# Patient Record
Sex: FEMALE | Race: WHITE | NOT HISPANIC OR LATINO | Employment: OTHER | URBAN - METROPOLITAN AREA
[De-identification: names, ages, dates, MRNs, and addresses within clinical notes are randomized per-mention and may not be internally consistent; named-entity substitution may affect disease eponyms.]

---

## 2017-01-09 DIAGNOSIS — E03.8 OTHER SPECIFIED HYPOTHYROIDISM: Primary | ICD-10-CM

## 2017-01-09 NOTE — TELEPHONE ENCOUNTER
Levothyroxine     Last Written Prescription Date: 12/05/16  Last Quantity: 30, # refills: 1  Last Office Visit with G, P or [facility] prescribing provider: 06/14/16        TSH   Date Value Ref Range Status   10/12/2016 3.90 0.40 - 4.00 mU/L Final

## 2017-01-10 RX ORDER — LEVOTHYROXINE SODIUM 88 UG/1
88 TABLET ORAL DAILY
Qty: 90 TABLET | Refills: 1 | Status: SHIPPED | OUTPATIENT
Start: 2017-01-10 | End: 2020-08-18

## 2017-07-01 ENCOUNTER — HOSPITAL ENCOUNTER (EMERGENCY)
Facility: CLINIC | Age: 67
Discharge: HOME | End: 2017-07-01
Attending: EMERGENCY MEDICINE | Admitting: EMERGENCY MEDICINE
Payer: MEDICARE

## 2017-07-01 ENCOUNTER — APPOINTMENT (OUTPATIENT)
Dept: CT IMAGING | Facility: CLINIC | Age: 67
End: 2017-07-01
Attending: EMERGENCY MEDICINE
Payer: MEDICARE

## 2017-07-01 VITALS
TEMPERATURE: 98.8 F | SYSTOLIC BLOOD PRESSURE: 148 MMHG | OXYGEN SATURATION: 97 % | RESPIRATION RATE: 20 BRPM | HEIGHT: 65 IN | HEART RATE: 64 BPM | DIASTOLIC BLOOD PRESSURE: 78 MMHG

## 2017-07-01 DIAGNOSIS — I10 ESSENTIAL HYPERTENSION: ICD-10-CM

## 2017-07-01 LAB
ANION GAP SERPL CALCULATED.3IONS-SCNC: 6 MMOL/L (ref 3–14)
BUN SERPL-MCNC: 15 MG/DL (ref 7–30)
CALCIUM SERPL-MCNC: 8.4 MG/DL (ref 8.5–10.1)
CHLORIDE SERPL-SCNC: 104 MMOL/L (ref 94–109)
CO2 SERPL-SCNC: 27 MMOL/L (ref 20–32)
CREAT SERPL-MCNC: 0.57 MG/DL (ref 0.52–1.04)
ERYTHROCYTE [DISTWIDTH] IN BLOOD BY AUTOMATED COUNT: 13.7 % (ref 10–15)
GFR SERPL CREATININE-BSD FRML MDRD: ABNORMAL ML/MIN/1.7M2
GLUCOSE SERPL-MCNC: 111 MG/DL (ref 70–99)
HCT VFR BLD AUTO: 45.2 % (ref 35–47)
HGB BLD-MCNC: 14.4 G/DL (ref 11.7–15.7)
MCH RBC QN AUTO: 26.9 PG (ref 26.5–33)
MCHC RBC AUTO-ENTMCNC: 31.9 G/DL (ref 31.5–36.5)
MCV RBC AUTO: 85 FL (ref 78–100)
PLATELET # BLD AUTO: 286 10E9/L (ref 150–450)
POTASSIUM SERPL-SCNC: 3.7 MMOL/L (ref 3.4–5.3)
RBC # BLD AUTO: 5.35 10E12/L (ref 3.8–5.2)
SODIUM SERPL-SCNC: 137 MMOL/L (ref 133–144)
TROPONIN I SERPL-MCNC: NORMAL UG/L (ref 0–0.04)
WBC # BLD AUTO: 5 10E9/L (ref 4–11)

## 2017-07-01 PROCEDURE — 25000128 H RX IP 250 OP 636: Performed by: EMERGENCY MEDICINE

## 2017-07-01 PROCEDURE — 70450 CT HEAD/BRAIN W/O DYE: CPT

## 2017-07-01 PROCEDURE — 99284 EMERGENCY DEPT VISIT MOD MDM: CPT | Mod: 25

## 2017-07-01 PROCEDURE — 96374 THER/PROPH/DIAG INJ IV PUSH: CPT

## 2017-07-01 PROCEDURE — 84484 ASSAY OF TROPONIN QUANT: CPT | Performed by: EMERGENCY MEDICINE

## 2017-07-01 PROCEDURE — 85027 COMPLETE CBC AUTOMATED: CPT | Performed by: EMERGENCY MEDICINE

## 2017-07-01 PROCEDURE — 25000125 ZZHC RX 250: Performed by: EMERGENCY MEDICINE

## 2017-07-01 PROCEDURE — 96375 TX/PRO/DX INJ NEW DRUG ADDON: CPT

## 2017-07-01 PROCEDURE — 80048 BASIC METABOLIC PNL TOTAL CA: CPT | Performed by: EMERGENCY MEDICINE

## 2017-07-01 RX ORDER — METOPROLOL TARTRATE 1 MG/ML
5 INJECTION, SOLUTION INTRAVENOUS ONCE
Status: COMPLETED | OUTPATIENT
Start: 2017-07-01 | End: 2017-07-01

## 2017-07-01 RX ORDER — ONDANSETRON 2 MG/ML
4 INJECTION INTRAMUSCULAR; INTRAVENOUS ONCE
Status: COMPLETED | OUTPATIENT
Start: 2017-07-01 | End: 2017-07-01

## 2017-07-01 RX ADMIN — METOPROLOL TARTRATE 5 MG: 5 INJECTION INTRAVENOUS at 05:24

## 2017-07-01 RX ADMIN — ONDANSETRON 4 MG: 2 INJECTION INTRAMUSCULAR; INTRAVENOUS at 05:24

## 2017-07-01 ASSESSMENT — ENCOUNTER SYMPTOMS: HEADACHES: 1

## 2017-07-01 NOTE — ED PROVIDER NOTES
History     Chief Complaint:  Hypertension    HPI   Jennifer Stinson is a 67 year old female, with a history of hypertension and hyperlipidemia, who presents to the emergency department for evaluation of hypertension. The patient came in with a female  who aided in translating the history and the reason for the emergency department. She reported that the patient has been experiencing high blood pressure for the last few days, and noted that even after taking her blood pressure medication, her blood pressure will drop for a small period of time and then increase once again. To note, the patient reported that she was prescribed metoprolol, but has not been taking it because she did not know what it was for. At 0300 this morning, she woke up with a headache, checked her blood pressure and found it to be high. This prompted the patient and her female  to present to the emergency department. The patient does not report any other physical complaints.      Allergies:  No Known Drug Allergies      Medications:    Synthroid  Omeprazole  Aspirin  Lipitor  Zestril  Metoprolol - Patient is supposed to be taking this, but reports has not been.  Norvasc     Past Medical History:    GERD  Hyperlipidemia  Hypertension  Hypothyroidism  Stroke    Past Surgical History:    Cholecystectomy    Family History:    The patient denies any relevant family medical history.     Social History:  The patient was accompanied to the ED by female .  Smoking Status: No  Smokeless Tobacco: N/A  Alcohol Use: No   Marital Status:       Review of Systems   Cardiovascular: Negative for chest pain.   Neurological: Positive for headaches.   All other systems reviewed and are negative.    Physical Exam   Vitals:  Patient Vitals for the past 24 hrs:   BP Temp Pulse Resp SpO2 Height   07/01/17 0615 148/78 - 64 - 97 % -   07/01/17 0545 146/81 - 65 - 99 % -   07/01/17 0532 149/81 - 64 20 97 % -   07/01/17 0525 165/88 - 66 - 95  "% -   07/01/17 0502 (!) 183/104 98.8  F (37.1  C) 78 20 98 % 1.651 m (5' 5\")      Physical Exam  Nursing note and vitals reviewed.  Constitutional: Cooperative.   HENT:   Mouth/Throat: Mucous membranes are normal.   Cardiovascular: Normal rate, regular rhythm and normal heart sounds.  No murmur.  Pulmonary/Chest: Effort normal and breath sounds normal. No respiratory distress. No wheezes. No rales.   Abdominal: Soft. Normal appearance. There is no tenderness. There is no rigidity and no guarding.   Neurological: Alert. Oriented x4.  Strength normal. CN 2-12 intact.   Skin: Skin is warm and dry. No rash noted.   Psychiatric: Normal mood and affect.    Emergency Department Course     Imaging:  Radiology findings were communicated with the patient and female  who voiced understanding of the findings.    CT Head w/o Contrast:  IMPRESSION: Unremarkable noncontrast CT of the head.  Reading per radiology.    Laboratory:  Laboratory findings were communicated with the patient and female  who voiced understanding of the findings.    CBC: AWNL (WBC 5.0, HGB 14.4, )   BMP: Glucose 111 (H), Calcium 8.4 (L) o/w WNL (Creatinine 0.57)     Troponin (Collected 0514): <0.015     Interventions:  0524 Metoprolol 5 mg IV  0524 Zofran 4 mg IV     Emergency Department Course:  Nursing notes and vitals reviewed.  I performed an exam of the patient as documented above.   The patient was sent for a CT Head while in the emergency department, results above.    IV was inserted and blood was drawn for laboratory testing, results above.     0615 The patient reports that her headache has gone from an 8/10 to a 4/10 on the pain scale.     I discussed the treatment plan with the patient. They expressed understanding of this plan and consented to discharge. They will be discharged home with instructions for care and follow up. In addition, the patient will return to the emergency department if their symptoms persist, worsen, " if new symptoms arise or if there is any concern.  All questions were answered.     I personally reviewed the laboratory results with the Patient and female  and answered all related questions prior to discharge.    Impression & Plan      Medical Decision Making:  Ms. Stinson is a 67 year old female who presents with headache and high blood pressure. She has no neurologic symptoms of concern and her head CT scan is negative for any bleed. She is supposed to be taking three different blood pressure medications, but is only taking two due to a miscommunication as to what the metoprolol was for. With Lopressor administration here, her blood pressure has corrected and she feels much better. Plan of care would be to have her start her metoprolol at home in addition to her other blood pressure medications and to follow up with her primary physician as needed.    Diagnosis:    ICD-10-CM    1. Essential hypertension I10       Disposition:   Discharged.    Scribe Disclosure:  I, Carissa Cabrera, am serving as a scribe at 5:17 AM on 7/1/2017 to document services personally performed by Noe Boyer MD, based on my observations and the provider's statements to me. 7/1/2017   [facility] EMERGENCY DEPARTMENT       Noe Boyer MD  07/01/17 0625

## 2017-07-01 NOTE — ED AVS SNAPSHOT
[facility] Emergency Department    [address] [facility] [address]    Phone:  [phone]    Fax:  [phone]                                       Jennifer Stinson   MRN: [id number]    Department:  [facility] Emergency Department   Date of Visit:  7/1/2017           Patient Information     Date Of Birth          1950        Your diagnoses for this visit were:     Essential hypertension        You were seen by Noe Boyer MD.      Follow-up Information     Follow up with Lucia cMbride MD.    Specialty:  Internal Medicine    Why:  As needed    Contact information:    [facility]  [address] [facility] [address]  [phone]        Discharge References/Attachments     HIGH BLOOD PRESSURE, CONTROLLING (ENGLISH)      24 Hour Appointment Hotline       To make an appointment at any [facility], call [phone] ([phone]). If you don't have a family doctor or clinic, we will help you find one. [facility] are conveniently located to serve the needs of you and your family.             Review of your medicines      Our records show that you are taking the medicines listed below. If these are incorrect, please call your family doctor or clinic.        Dose / Directions Last dose taken    amLODIPine 10 MG tablet   Commonly known as:  NORVASC   Dose:  10 mg   Quantity:  90 tablet        Take 1 tablet (10 mg) by mouth daily   Refills:  1        aspirin 325 MG EC tablet   Dose:  325 mg   Quantity:  60 tablet        Take 1 tablet (325 mg) by mouth daily   Refills:  0        atorvastatin 40 MG tablet   Commonly known as:  LIPITOR   Dose:  40 mg   Quantity:  30 tablet        Take 1 tablet (40 mg) by mouth daily   Refills:  0        levothyroxine 88 MCG tablet   Commonly known as:  SYNTHROID/LEVOTHROID   Dose:  88 mcg   Quantity:  90 tablet        Take 1 tablet (88 mcg) by mouth daily   Refills:  1        lisinopril 40 MG tablet    Commonly known as:  PRINIVIL/ZESTRIL   Dose:  40 mg   Quantity:  30 tablet        Take 1 tablet (40 mg) by mouth daily   Refills:  0        metoprolol 25 MG tablet   Commonly known as:  LOPRESSOR   Dose:  12.5 mg   Quantity:  60 tablet        Take 0.5 tablets (12.5 mg) by mouth 2 times daily   Refills:  0        omeprazole 20 MG tablet   Dose:  20 mg   Quantity:  60 tablet        Take 1 tablet (20 mg) by mouth daily Take 30-60 minutes before a meal.   Refills:  5                Procedures and tests performed during your visit     Basic metabolic panel    CBC (platelets, no diff)    Head CT w/o contrast    IV access    Troponin I (now)      Orders Needing Specimen Collection     None      Pending Results     No orders found from 6/29/2017 to 7/2/2017.            Pending Culture Results     No orders found from 6/29/2017 to 7/2/2017.            Pending Results Instructions     If you had any lab results that were not finalized at the time of your Discharge, you can call the ED Lab Result RN at [phone]. You will be contacted by this team for any positive Lab results or changes in treatment. The nurses are available 7 days a week from 10A to 6:30P.  You can leave a message 24 hours per day and they will return your call.        Test Results From Your Hospital Stay        7/1/2017  6:15 AM      Narrative     CT HEAD W/O CONTRAST  7/1/2017 6:06 AM    HISTORY: Headache. Hypertension.    TECHNIQUE: Volumetric helical acquisition through the head without IV  contrast, displayed as 0.5 cm axial reconstructions. Radiation dose  for this scan was reduced using automated exposure control, adjustment  of the mA and/or kV according to patient size, or iterative  reconstruction technique.    COMPARISON: 9/11/2014.    FINDINGS: No acute intracranial abnormality. No intracranial  hemorrhage, mass lesion, or acute infarction identified. Ventricles  are within normal limits for size and configuration. The visualized  paranasal  sinuses and mastoid air cells appear unremarkable. No bony  abnormality is seen.        Impression     IMPRESSION: Unremarkable noncontrast CT of the head.    LINDSEY SUMMERS MD         7/1/2017  5:41 AM      Component Results     Component Value Ref Range & Units Status    WBC 5.0 4.0 - 11.0 10e9/L Final    RBC Count 5.35 (H) 3.8 - 5.2 10e12/L Final    Hemoglobin 14.4 11.7 - 15.7 g/dL Final    Hematocrit 45.2 35.0 - 47.0 % Final    MCV 85 78 - 100 fl Final    MCH 26.9 26.5 - 33.0 pg Final    MCHC 31.9 31.5 - 36.5 g/dL Final    RDW 13.7 10.0 - 15.0 % Final    Platelet Count 286 150 - 450 10e9/L Final         7/1/2017  6:01 AM      Component Results     Component Value Ref Range & Units Status    Troponin I ES  0.000 - 0.045 ug/L Final    <0.015  The 99th percentile for upper reference range is 0.045 ug/L.  Troponin values in   the range of 0.045 - 0.120 ug/L may be associated with risks of adverse   clinical events.           7/1/2017  6:01 AM      Component Results     Component Value Ref Range & Units Status    Sodium 137 133 - 144 mmol/L Final    Potassium 3.7 3.4 - 5.3 mmol/L Final    Chloride 104 94 - 109 mmol/L Final    Carbon Dioxide 27 20 - 32 mmol/L Final    Anion Gap 6 3 - 14 mmol/L Final    Glucose 111 (H) 70 - 99 mg/dL Final    Urea Nitrogen 15 7 - 30 mg/dL Final    Creatinine 0.57 0.52 - 1.04 mg/dL Final    GFR Estimate >90  Non  GFR Calc   >60 mL/min/1.7m2 Final    GFR Estimate If Black >90   GFR Calc   >60 mL/min/1.7m2 Final    Calcium 8.4 (L) 8.5 - 10.1 mg/dL Final                Clinical Quality Measure: Blood Pressure Screening     Your blood pressure was checked while you were in the emergency department today. The last reading we obtained was  BP: 148/78 . Please read the guidelines below about what these numbers mean and what you should do about them.  If your systolic blood pressure (the top number) is less than 120 and your diastolic blood pressure (the bottom  "number) is less than 80, then your blood pressure is normal. There is nothing more that you need to do about it.  If your systolic blood pressure (the top number) is 120-139 or your diastolic blood pressure (the bottom number) is 80-89, your blood pressure may be higher than it should be. You should have your blood pressure rechecked within a year by a primary care provider.  If your systolic blood pressure (the top number) is 140 or greater or your diastolic blood pressure (the bottom number) is 90 or greater, you may have high blood pressure. High blood pressure is treatable, but if left untreated over time it can put you at risk for heart attack, stroke, or kidney failure. You should have your blood pressure rechecked by a primary care provider within the next 4 weeks.  If your provider in the emergency department today gave you specific instructions to follow-up with your doctor or provider even sooner than that, you should follow that instruction and not wait for up to 4 weeks for your follow-up visit.        Thank you for choosing [facility]       Thank you for choosing [facility] for your care. Our goal is always to provide you with excellent care. Hearing back from our patients is one way we can continue to improve our services. Please take a few minutes to complete the written survey that you may receive in the mail after you visit with us. Thank you!        Leikr Information     Leikr lets you send messages to your doctor, view your test results, renew your prescriptions, schedule appointments and more. To sign up, go to [email or web address] . Click on \"Log in\" on the left side of the screen, which will take you to the Welcome page. Then click on \"Sign up Now\" on the right side of the page.     You will be asked to enter the access code listed below, as well as some personal information. Please follow the directions to create your username and password.     Your access code is: [email or web address]  Expires: " 2017  6:17 AM     Your access code will  in 90 days. If you need help or a new code, please call your [facility] clinic or [phone].        Care EveryWhere ID     This is your Care EveryWhere ID. This could be used by other organizations to access your [facility] medical records  [id number]        Equal Access to Services     LANNY MEIER : Imelda Mckay, aundrea rangel, herbert graff, amanda sabillon . So [facility] [phone].    ATENCIÓN: Si habla español, tiene a andre disposición servicios gratuitos de asistencia lingüística. Llame al [phone].    We comply with applicable federal civil rights laws and [place] laws. We do not discriminate on the basis of race, color, national origin, age, disability sex, sexual orientation or gender identity.            After Visit Summary       This is your record. Keep this with you and show to your community pharmacist(s) and doctor(s) at your next visit.

## 2017-07-01 NOTE — ED AVS SNAPSHOT
[facility] Emergency Department    [address]    [facility] [address]    Phone:  [phone]    Fax:  [phone]                                       Jennifer Stinson   MRN: [id number]    Department:  [facility] Emergency Department   Date of Visit:  7/1/2017           After Visit Summary Signature Page     I have received my discharge instructions, and my questions have been answered. I have discussed any challenges I see with this plan with the nurse or doctor.    ..........................................................................................................................................  Patient/Patient Representative Signature      ..........................................................................................................................................  Patient Representative Print Name and Relationship to Patient    ..................................................               ................................................  Date                                            Time    ..........................................................................................................................................  Reviewed by Signature/Title    ...................................................              ..............................................  Date                                                            Time

## 2017-07-01 NOTE — ED NOTES
Pt to ER with c/o high BP pt developed a HA so she checked her BP and found it to be high, pt is currently on 3 HTN meds

## 2018-05-14 ENCOUNTER — TRANSFERRED RECORDS (OUTPATIENT)
Dept: HEALTH INFORMATION MANAGEMENT | Facility: CLINIC | Age: 68
End: 2018-05-14

## 2018-05-22 ENCOUNTER — TRANSFERRED RECORDS (OUTPATIENT)
Dept: HEALTH INFORMATION MANAGEMENT | Facility: CLINIC | Age: 68
End: 2018-05-22

## 2018-06-13 ENCOUNTER — OFFICE VISIT (OUTPATIENT)
Dept: INTERNAL MEDICINE | Facility: CLINIC | Age: 68
End: 2018-06-13
Payer: MEDICARE

## 2018-06-13 ENCOUNTER — TELEPHONE (OUTPATIENT)
Dept: INTERNAL MEDICINE | Facility: CLINIC | Age: 68
End: 2018-06-13

## 2018-06-13 VITALS
RESPIRATION RATE: 12 BRPM | OXYGEN SATURATION: 96 % | DIASTOLIC BLOOD PRESSURE: 90 MMHG | HEART RATE: 99 BPM | TEMPERATURE: 97.7 F | WEIGHT: 200.9 LBS | SYSTOLIC BLOOD PRESSURE: 150 MMHG | BODY MASS INDEX: 33.43 KG/M2

## 2018-06-13 DIAGNOSIS — I63.9 CEREBROVASCULAR ACCIDENT (CVA), UNSPECIFIED MECHANISM (H): ICD-10-CM

## 2018-06-13 DIAGNOSIS — I10 ESSENTIAL HYPERTENSION: ICD-10-CM

## 2018-06-13 DIAGNOSIS — Z01.818 PREOP GENERAL PHYSICAL EXAM: Primary | ICD-10-CM

## 2018-06-13 DIAGNOSIS — R94.39 ABNORMAL CARDIOVASCULAR STRESS TEST: ICD-10-CM

## 2018-06-13 LAB — HGB BLD-MCNC: 13.3 G/DL (ref 11.7–15.7)

## 2018-06-13 PROCEDURE — 85018 HEMOGLOBIN: CPT | Performed by: INTERNAL MEDICINE

## 2018-06-13 PROCEDURE — 36415 COLL VENOUS BLD VENIPUNCTURE: CPT | Performed by: INTERNAL MEDICINE

## 2018-06-13 PROCEDURE — 80048 BASIC METABOLIC PNL TOTAL CA: CPT | Performed by: INTERNAL MEDICINE

## 2018-06-13 PROCEDURE — 93000 ELECTROCARDIOGRAM COMPLETE: CPT | Performed by: INTERNAL MEDICINE

## 2018-06-13 PROCEDURE — 99215 OFFICE O/P EST HI 40 MIN: CPT | Performed by: INTERNAL MEDICINE

## 2018-06-13 RX ORDER — METOPROLOL SUCCINATE 50 MG/1
50 TABLET, EXTENDED RELEASE ORAL DAILY
Qty: 30 TABLET | Refills: 1 | Status: SHIPPED | OUTPATIENT
Start: 2018-06-13 | End: 2018-09-02

## 2018-06-13 NOTE — NURSING NOTE
/90  Pulse 99  Temp 97.7  F (36.5  C) (Oral)  Resp 12  Wt 200 lb 14.4 oz (91.1 kg)  SpO2 96%  BMI 33.43 kg/m2  Patient in for Pre-Op.  Karin Boudreaux CMA

## 2018-06-13 NOTE — MR AVS SNAPSHOT
After Visit Summary   6/13/2018    Jennifer Stinson    MRN: [id number]           Patient Information     Date Of Birth          1950        Visit Information        Provider Department      6/13/2018 10:45 AM Lucia Mcbride MD; [employer] [facility]        Today's Diagnoses     Preop general physical exam    -  1    Abnormal cardiovascular stress test        Essential hypertension        Cerebrovascular accident (CVA), unspecified mechanism (H)          Care Instructions      Before Your Surgery      Call your surgeon if there is any change in your health. This includes signs of a cold or flu (such as a sore throat, runny nose, cough, rash or fever).    Do not smoke, drink alcohol or take over the counter medicine (unless your surgeon or primary care doctor tells you to) for the 24 hours before and after surgery.    If you take prescribed drugs: Follow your doctor s orders about which medicines to take and which to stop until after surgery.    Eating and drinking prior to surgery: follow the instructions from your surgeon    Take a shower or bath the night before surgery. Use the soap your surgeon gave you to gently clean your skin. If you do not have soap from your surgeon, use your regular soap. Do not shave or scrub the surgery site.  Wear clean pajamas and have clean sheets on your bed.           Follow-ups after your visit        Additional Services     CARDIOLOGY EVAL ADULT REFERRAL       Preferred location:--  Cardiac clearance - abnormal Nuclear stress test in 2016, pt declined angiogram   [facility] [phone]   [email or web address]    Please be aware that coverage of these services is subject to the terms and limitations of your health insurance plan.  Call member services at your health plan with any benefit or coverage questions.      Please bring the following to your  "appointment:  Any x-rays, CTs or MRIs which have been performed. Contact the facility where they were done to arrange for  prior to your scheduled appointment.    List of current medications  This referral request   Any documents/labs given to you for this referral                  Your next 10 appointments already scheduled     Jun 26, 2018  1:15 PM CDT   New Visit with Mario Rhodes MD   [facility]    [address] [place] [id number] [phone] OPT 2              Who to contact     If you have questions or need follow up information about today's clinic visit or your schedule please contact [facility] directly at [phone].  Normal or non-critical lab and imaging results will be communicated to you by MyChart, letter or phone within 4 business days after the clinic has received the results. If you do not hear from us within 7 days, please contact the clinic through [employer] or phone. If you have a critical or abnormal lab result, we will notify you by phone as soon as possible.  Submit refill requests through iT[employer] or call your pharmacy and they will forward the refill request to us. Please allow 3 business days for your refill to be completed.          Additional Information About Your Visit        [employer] Information     iTwin lets you send messages to your doctor, view your test results, renew your prescriptions, schedule appointments and more. To sign up, go to [email or web address] . Click on \"Log in\" on the left side of the screen, which will take you to the Welcome page. Then click on \"Sign up Now\" on the right side of the page.     You will be asked to enter the access code listed below, as well as some personal information. Please follow the directions to create your username and password.     Your access code is: [email or web address]  Expires: 9/12/2018  4:18 PM     Your access code will "  in 90 days. If you need help or a new code, please call your [facility] clinic or [phone].        Care EveryWhere ID     This is your Care EveryWhere ID. This could be used by other organizations to access your [facility] medical records  [id number]        Your Vitals Were     Pulse Temperature Respirations Pulse Oximetry BMI (Body Mass Index)       99 97.7  F (36.5  C) (Oral) 12 96% 33.43 kg/m2        Blood Pressure from Last 3 Encounters:   18 150/90   17 148/78   16 149/80    Weight from Last 3 Encounters:   18 200 lb 14.4 oz (91.1 kg)   16 202 lb 3.2 oz (91.7 kg)   16 194 lb 6.4 oz (88.2 kg)              We Performed the Following     Basic metabolic panel     CARDIOLOGY EVAL ADULT REFERRAL     EKG 12-lead complete w/read - Clinics     Hemoglobin          Today's Medication Changes          These changes are accurate as of 18 11:59 PM.  If you have any questions, ask your nurse or doctor.               Start taking these medicines.        Dose/Directions    metoprolol succinate 50 MG 24 hr tablet   Commonly known as:  TOPROL-XL   Used for:  Essential hypertension   Started by:  Lucia Mcbride MD        Dose:  50 mg   Take 1 tablet (50 mg) by mouth daily   Quantity:  30 tablet   Refills:  1            Where to get your medicines      These medications were sent to [facility] [address] [place] [address]     Phone:  [phone]     metoprolol succinate 50 MG 24 hr tablet                Primary Care Provider Office Phone # Fax #    Lucia Mcbride [phone]       [address]  [facility] [address]        Equal Access to Services     LANNY MEIER AH: Imelda rose Soreji, wamynor luqadaha, qaybta kaalamanda prakash. [facility] [phone].    ATENCIÓN: Si mariza mukherjee,  tiene a andre disposición servicios gratuitos de asistencia lingüística. Heidi finley [phone].    We comply with applicable federal civil rights laws and [place] laws. We do not discriminate on the basis of race, color, national origin, age, disability, sex, sexual orientation, or gender identity.            Thank you!     Thank you for choosing [facility]  for your care. Our goal is always to provide you with excellent care. Hearing back from our patients is one way we can continue to improve our services. Please take a few minutes to complete the written survey that you may receive in the mail after your visit with us. Thank you!             Your Updated Medication List - Protect others around you: Learn how to safely use, store and throw away your medicines at www.disposemymeds.org.          This list is accurate as of 6/13/18 11:59 PM.  Always use your most recent med list.                   Brand Name Dispense Instructions for use Diagnosis    amLODIPine 10 MG tablet    NORVASC    90 tablet    Take 1 tablet (10 mg) by mouth daily    Essential hypertension with goal blood pressure less than 140/90       aspirin 325 MG EC tablet     60 tablet    Take 1 tablet (325 mg) by mouth daily    Chest pain, unspecified type       atorvastatin 40 MG tablet    LIPITOR    30 tablet    Take 1 tablet (40 mg) by mouth daily    Chest pain, unspecified type       levothyroxine 88 MCG tablet    SYNTHROID/LEVOTHROID    90 tablet    Take 1 tablet (88 mcg) by mouth daily    Other specified hypothyroidism       lisinopril 40 MG tablet    PRINIVIL/ZESTRIL    30 tablet    Take 1 tablet (40 mg) by mouth daily    Essential hypertension       metoprolol succinate 50 MG 24 hr tablet    TOPROL-XL    30 tablet    Take 1 tablet (50 mg) by mouth daily    Essential hypertension       omeprazole 20 MG tablet     60 tablet    Take 1 tablet (20 mg) by mouth daily Take 30-60 minutes before a meal.    Gastroesophageal reflux disease  without esophagitis

## 2018-06-13 NOTE — PROGRESS NOTES
[place] [address]  [facility] [address]  [phone]  Dept: [phone]    PRE-OP EVALUATION:  Today's date: 2018    Jennifer Stinson (: 1950) presents for pre-operative evaluation assessment as requested by Dr. Devries.  She requires evaluation and anesthesia risk assessment prior to undergoing surgery/procedure for treatment of  Lt sinus pain  Removal of titanium dental implant form lt maxillary sinus ..    Fax number for surgical facility: [facility]   [phone]  Primary Physician: Lucia Mcbride  Type of Anesthesia Anticipated: General    Patient has a Health Care Directive or Living Will:  NO    Preop Questions 2018   Who is doing your surgery? [facility]   Type of surgery  Removal of dental implant from lt maxillary sinus .    1.  Do you have a history of Heart attack, stroke, stent, coronary bypass surgery, or other heart surgery? YES  - stroke    2.  Do you ever have any pain or discomfort in your chest? YES -    3.  Do you have a history of  Heart Failure? No   4.   Are you troubled by shortness of breath when:  walking on a level surface, or up a slight hill, or at night? No   5.  Do you currently have a cold, bronchitis or other respiratory infection? No   6.  Do you have a cough, shortness of breath, or wheezing? No   7.  Do you sometimes get pains in the calves of your legs when you walk? No   8. Do you or anyone in your family have previous history of blood clots? No   9.  Do you or does anyone in your family have a serious bleeding problem such as prolonged bleeding following surgeries or cuts? No   10. Have you ever had problems with anemia or been told to take iron pills? No   11. Have you had any abnormal blood loss such as black, tarry or bloody stools, or abnormal vaginal bleeding? No   12. Have you ever had a blood transfusion? No   13. Have you or any of your relatives ever had problems with  anesthesia? No   14. Do you have sleep apnea, excessive snoring or daytime drowsiness? No   15. Do you have any prosthetic heart valves? No   16. Do you have prosthetic joints? No   17. Is there any chance that you may be pregnant? No         HPI:       HYPERLIPIDEMIA - Patient has a long history of Hyperlipidemia requiring medication for treatment with recent fair control. Patient reports no problems or side effects with the medication.                                                                                                                                                       .  HYPERTENSION - Patient has longstanding history of HTN , currently denies any symptoms referable to elevated blood pressure. Specifically denies chest pain, palpitations, dyspnea, orthopnea, PND or peripheral edema. Blood pressure readings have not been in normal range. Current medication regimen is as listed below. Patient denies any side effects of medication.     Pt has not been taking Metoprolol since 2016                                                                                                                                                                                .  HYPOTHYROIDISM - Patient has a longstanding history of chronic Hypothyroidism. Patient has been doing well, noting no tremor, insomnia, hair loss or changes in skin texture. Continues to take medications as directed, without adverse reactions or side effects. Last /6. But pt has been seeing MD at [facility] and had TSH 2 mths ago per pt   Lab Results   Component Value Date    TSH 3.90 10/12/2016     Had abnormal nuclear stress test in 2016, pt was advised to get angiogram but pt declined and did not f/u.              .    MEDICAL HISTORY:     Patient Active Problem List    Diagnosis Date Noted     Abdominal pain 11/28/2016     Priority: Medium     Other specified hypothyroidism 02/10/2016     Priority: Medium     Essential hypertension  01/06/2016     Priority: Medium     Gastroesophageal reflux disease without esophagitis 01/06/2016     Priority: Medium     Hyperlipidemia with target LDL less than 130      Priority: Medium     Diagnosis updated by automated process. Provider to review and confirm.        Past Medical History:   Diagnosis Date     GERD (gastroesophageal reflux disease)      Hyperlipidemia LDL goal < 130      Hypertension      Hypothyroidism      Stroke (H)      Past Surgical History:   Procedure Laterality Date     CHOLECYSTECTOMY       Current Outpatient Prescriptions   Medication Sig Dispense Refill     amLODIPine (NORVASC) 10 MG tablet Take 1 tablet (10 mg) by mouth daily 90 tablet 1     aspirin  MG EC tablet Take 1 tablet (325 mg) by mouth daily 60 tablet 0     atorvastatin (LIPITOR) 40 MG tablet Take 1 tablet (40 mg) by mouth daily 30 tablet 0     levothyroxine (SYNTHROID/LEVOTHROID) 88 MCG tablet Take 1 tablet (88 mcg) by mouth daily 90 tablet 1     lisinopril (PRINIVIL,ZESTRIL) 40 MG tablet Take 1 tablet (40 mg) by mouth daily 30 tablet 0     metoprolol (LOPRESSOR) 25 MG tablet Take 0.5 tablets (12.5 mg) by mouth 2 times daily 60 tablet 0     omeprazole 20 MG tablet Take 1 tablet (20 mg) by mouth daily Take 30-60 minutes before a meal. 60 tablet 5     OTC products: None, except as noted above    Allergies   Allergen Reactions     No Clinical Screening - See Comments Swelling and Rash     Other reaction(s): Edema  All meat: poultry, beef, etc.  Reaction: swelling  Cinnamon and other spices  Reaction: face swelling, rash  Cinnamon and other spices  Reaction: face swelling, rash  All meat: poultry, beef, etc.  Reaction: swelling     Nuts Other (See Comments) and Rash     All nuts  Reaction: swelling, rash, headache     Peanut-Derived Rash     Other reaction(s): Headache  All nuts  Reaction: swelling, rash, headache      Latex Allergy: NO    Social History   Substance Use Topics     Smoking status: Never Smoker      Smokeless tobacco: Never Used     Alcohol use No     History   Drug Use No       REVIEW OF SYSTEMS:   CONSTITUTIONAL: NEGATIVE for fever, chills, change in weight  INTEGUMENTARY/SKIN: NEGATIVE for worrisome rashes, moles or lesions  EYES: NEGATIVE for vision changes or irritation  ENT/MOUTH: NEGATIVE for ear, mouth and throat problems  RESP: NEGATIVE for significant cough or SOB  BREAST: NEGATIVE for masses, tenderness or discharge  CV: NEGATIVE for chest pain, palpitations or peripheral edema  GI: NEGATIVE for nausea, abdominal pain, heartburn, or change in bowel habits  : NEGATIVE for frequency, dysuria, or hematuria  MUSCULOSKELETAL: NEGATIVE for significant arthralgias or myalgia  NEURO: NEGATIVE for weakness, dizziness or paresthesias  ENDOCRINE: NEGATIVE for temperature intolerance, skin/hair changes  HEME: NEGATIVE for bleeding problems  PSYCHIATRIC: NEGATIVE for changes in mood or affect    EXAM:   /90  Pulse 99  Temp 97.7  F (36.5  C) (Oral)  Resp 12  Wt 200 lb 14.4 oz (91.1 kg)  SpO2 96%  BMI 33.43 kg/m2    GENERAL APPEARANCE: healthy, alert and no distress     EYES: EOMI, PERRL     HENT: ear canals and TM's normal and nose and mouth without ulcers or lesions     NECK: no adenopathy, no asymmetry, masses, or scars and thyroid normal to palpation     RESP: lungs clear to auscultation - no rales, rhonchi or wheezes     CV: regular rates and rhythm,       ABDOMEN:  soft, nontender, no HSM or masses and bowel sounds normal     MS: extremities normal- no gross deformities noted, no evidence of inflammation in joints, FROM in all extremities.     NEURO: Normal strength and tone, sensory exam grossly normal, mentation intact and speech normal     PSYCH: mentation appears normal. and affect normal/bright     LYMPHATICS: No cervical adenopathy    DIAGNOSTICS:   EKG: sinus rhythm , normal intervals, no acute ST/T changes c/w ischemia, no LVH by voltage criteria,   Hemoglobin  Pending   Serum Potassium  pending     Recent Labs   Lab Test  07/01/17   0514  11/29/16   0700  11/28/16   1722  11/28/16   0031   11/25/11   1904  11/25/11   0045   HGB  14.4   --   13.0  13.4   < >  12.4  13.2   PLT  286   --   301  307   < >  224  258   INR   --    --    --    --    --    --   0.96   NA  137   --    --   137   < >  139  141   POTASSIUM  3.7  3.8   --   3.7   < >  3.2*  3.5   CR  0.57   --    --   0.60   < >  0.52  0.59   A1C   --    --    --    --    --   5.5   --     < > = values in this interval not displayed.        IMPRESSION:      (Z01.818) Preop general physical exam  (primary encounter diagnosis)  Comment: removal dental implant form lt maxillary sinus   Plan: Basic metabolic panel, Hemoglobin,              (R94.39) Abnormal cardiovascular stress test  Plan: stress test 2016- Myocardial perfusion imaging using single isotope technique demonstrated infarction of the basal and mid inferior wall with significant residual inferior and inferolateral ischemia. No f/u done, pt declined angiogram. will refer to cardiology for cardiac clearance before surgery .CARDIOLOGY EVAL ADULT REFERRAL            (I10) Essential hypertension  Plan: BP elevated, started on Metoprolol 50 mg once daily as directed.explained clearly about the medication,insructions and side effects. Advised to continue Amlodipine 10 mg daily and lisinopril 40 mg daily.Advised to follow low salt diet and exercise and f/u in 1mth.       (I63.9) Cerebrovascular accident (CVA), unspecified mechanism (H)  --stable, on aspirin 325 mg daily.        The proposed surgical procedure is considered INTERMEDIATE risk.    REVISED CARDIAC RISK INDEX  The patient has the following serious cardiovascular risks for perioperative complications such as (MI, PE, VFib and 3  AV Block):  Coronary Artery Disease (MI, positive stress test, angina, Qs on EKG)  Cerebrovascular Disease (TIA or CVA)  INTERPRETATION: 2 risks: Class III (moderate risk - 6.6% complication rate)           ICD-10-CM    1. Preop general physical exam Z01.818 Basic metabolic panel     Hemoglobin     CARDIOLOGY EVAL ADULT REFERRAL   2. Abnormal cardiovascular stress test R94.39 CARDIOLOGY EVAL ADULT REFERRAL   3. Essential hypertension I10 metoprolol succinate (TOPROL-XL) 50 MG 24 hr tablet   4. Cerebrovascular accident (CVA), unspecified mechanism (H) I63.9        RECOMMENDATIONS:       Cardiovascular Risk  Performs 4 METs exercise without symptoms (Light housework (dusting, washing dishes), Climb a flight of stairs and Walk on level ground at 15 minutes per mile (4 miles/hour)) .         Anticoagulant or Antiplatelet Medication Use  ASPIRIN: Discontinue ASA 7  days prior to procedure to reduce bleeding risk.    NSAIDS:   Stop  3 days prior to surgery        ACE Inhibitor or Angiotensin Receptor Blocker (ARB) Use  Ace inhibitor or Angiotensin Receptor Blocker (ARB) and should HOLD this medication for the 24 hours prior to surgery.      Patient should be evaluated by cardiology and needs cardiology clearance for the surgery as patient had abnormal stress test and declined angiogram in 2016.       Signed Electronically by: Lucia Mcbride MD    Copy of this evaluation report is provided to requesting physician.    [facility] Preop Guidelines    Revised Cardiac Risk Index

## 2018-06-13 NOTE — TELEPHONE ENCOUNTER
Brunilda a nurse at Dr. Jose Eduardo Devries's office calling about surgery tomorrow.  She received a phone call from  while pt was at preop but it was cutting in and out so she could not tell what it was regarding.  She is wondering if pt is cleared for surgery tomorrow?  Please advise.    Brunilda [phone]

## 2018-06-14 LAB
ANION GAP SERPL CALCULATED.3IONS-SCNC: 10 MMOL/L (ref 3–14)
BUN SERPL-MCNC: 14 MG/DL (ref 7–30)
CALCIUM SERPL-MCNC: 8.8 MG/DL (ref 8.5–10.1)
CHLORIDE SERPL-SCNC: 106 MMOL/L (ref 94–109)
CO2 SERPL-SCNC: 27 MMOL/L (ref 20–32)
CREAT SERPL-MCNC: 0.53 MG/DL (ref 0.52–1.04)
GFR SERPL CREATININE-BSD FRML MDRD: >90 ML/MIN/1.7M2
GLUCOSE SERPL-MCNC: 131 MG/DL (ref 70–99)
POTASSIUM SERPL-SCNC: 4.1 MMOL/L (ref 3.4–5.3)
SODIUM SERPL-SCNC: 143 MMOL/L (ref 133–144)

## 2018-06-27 ENCOUNTER — OFFICE VISIT (OUTPATIENT)
Dept: CARDIOLOGY | Facility: CLINIC | Age: 68
End: 2018-06-27
Attending: INTERNAL MEDICINE
Payer: MEDICARE

## 2018-06-27 VITALS
DIASTOLIC BLOOD PRESSURE: 81 MMHG | WEIGHT: 199.3 LBS | HEIGHT: 66 IN | BODY MASS INDEX: 32.03 KG/M2 | SYSTOLIC BLOOD PRESSURE: 135 MMHG | HEART RATE: 80 BPM

## 2018-06-27 DIAGNOSIS — R01.1 HEART MURMUR: ICD-10-CM

## 2018-06-27 DIAGNOSIS — R94.39 ABNORMAL NUCLEAR STRESS TEST: Primary | ICD-10-CM

## 2018-06-27 PROCEDURE — 99204 OFFICE O/P NEW MOD 45 MIN: CPT | Performed by: INTERNAL MEDICINE

## 2018-06-27 NOTE — LETTER
6/27/2018    Lucia Mcbride MD  [address] [facility] [address]    RE: Jennifer Stinson       Dear Colleague,    I had the pleasure of seeing Jennifer Stinson in the [facility].    Cardiology Consultation      Jennifer Stinson MRN# [id number]   YOB: 1950 Age: 68 year old   Date of Visit 06/27/2018     Reason for consult:  Preoperative cardiovascular assessment           Assessment and Plan:     1. Preoperative cardiovascular assessment    Patient is moderate (not elevated) risk for a mild-moderate risk procedure.  She does not need any further cardiovascular testing prior to her sinus surgery.  She has good exercise tolerance without symptoms.  Continue cardiovascular medications as able.      2. Cardiac murmur    Likely not hemodynamically significant.  Recheck echo.  Does not need to be done prior to sinus surgery.      3. Previous history of abnormal nuclear stress test 2016, potentially due to artifact    Follow-up with CT coronary angiogram.  Does not need to be done prior to sinus surgery.    If significant coronary artery disease on CT, may consider going to full dose atorvastatin.    Follow-up after echo and CT coronary angiogram per patient request to review results.    This note was transcribed using electronic voice recognition software, typographical errors may be present.                Chief Complaint:   Cardiac Clearance           History of Present Illness:   This patient is a very pleasant 68 year old female who is here for preoperative cardiovascular clearance for a very minor sinus surgery to remove an implant.  She denies any exertional chest discomfort or dyspnea on exertion.  She can climb 2 flights of stairs without stopping for shortness of breath or chest discomfort.    She has history of stroke with no residual defect.  She is on aspirin for that.  She has had previous workup for face pain that turned out to be because of dental,  "but as part of that she had an ischemic workup with abnormal nuclear stress test in 2016.  That stress test suggested possible inferior ischemia/infarct, but it was very technically limited.  The patient's family describes that they were not very happy with the way tests were ordered.  It sounds like she was scheduled for an angiogram but they wanted to talk to the cardiologist first before going down.  Ultimately, they declined the procedure due to lack of communication.  It did turn out that her symptoms at that time were from dental rather than ischemia.  With addressing that, her discomfort went completely away.    Echo in 2011 did not have any significant structural abnormality.  Normal ejection fraction both on the echo and the nuclear stress test.         Physical Exam:     Vitals: /81 (BP Location: Left arm, Cuff Size: Adult Large)  Pulse 80  Ht 1.676 m (5' 6\")  Wt 90.4 kg (199 lb 4.8 oz)  BMI 32.17 kg/m2  Constitutional:  cooperative, alert and oriented, well developed, well nourished, in no acute distress        Skin:  no apparent skin lesions or masses noted        Head:  normocephalic, no masses or lesions        Eyes:  pupils equal and round        ENT:  no pallor or cyanosis        Neck:  no stiffness        Chest:  normal breath sounds, clear to auscultation, normal A-P diameter, normal symmetry, normal respiratory excursion, no use of accessory muscles        Cardiac: regular rhythm       RUSB;grade 2;early systolic murmur          Abdomen:      benign    Extremities and Back:  no edema   lashanda    Neurological:  no gross motor deficits;affect appropriate                    Past Medical History:   I have reviewed this patient's past medical history  Past Medical History:   Diagnosis Date     GERD (gastroesophageal reflux disease)      Hyperlipidemia LDL goal < 130      Hypertension      Hypothyroidism      Stroke (H)              Past Surgical History:   I have reviewed this patient's past " surgical history  Past Surgical History:   Procedure Laterality Date     CHOLECYSTECTOMY                 Social History:   I have reviewed this patient's social history  Social History   Substance Use Topics     Smoking status: Never Smoker     Smokeless tobacco: Never Used     Alcohol use No             Family History:   I have reviewed this patient's family history  Family History   Problem Relation Age of Onset     Family History Negative Mother      Family History Negative Father      Family History Negative Maternal Grandmother      Family History Negative Maternal Grandfather      Family History Negative Paternal Grandmother      Family History Negative Paternal Grandfather      Diabetes No family hx of      Thyroid Disease No family hx of      Cancer No family hx of              Allergies:     Allergies   Allergen Reactions     No Clinical Screening - See Comments Swelling and Rash     Other reaction(s): Edema  All meat: poultry, beef, etc.  Reaction: swelling  Cinnamon and other spices  Reaction: face swelling, rash  Cinnamon and other spices  Reaction: face swelling, rash  All meat: poultry, beef, etc.  Reaction: swelling     Nuts Other (See Comments) and Rash     All nuts  Reaction: swelling, rash, headache     Peanut-Derived Rash     Other reaction(s): Headache  All nuts  Reaction: swelling, rash, headache             Medications:   I have reviewed this patient's current medications  Current Outpatient Prescriptions   Medication Sig Dispense Refill     amLODIPine (NORVASC) 10 MG tablet Take 1 tablet (10 mg) by mouth daily 90 tablet 1     aspirin  MG EC tablet Take 1 tablet (325 mg) by mouth daily 60 tablet 0     atorvastatin (LIPITOR) 40 MG tablet Take 1 tablet (40 mg) by mouth daily 30 tablet 0     levothyroxine (SYNTHROID/LEVOTHROID) 88 MCG tablet Take 1 tablet (88 mcg) by mouth daily 90 tablet 1     lisinopril (PRINIVIL,ZESTRIL) 40 MG tablet Take 1 tablet (40 mg) by mouth daily 30 tablet 0      metoprolol succinate (TOPROL-XL) 50 MG 24 hr tablet Take 1 tablet (50 mg) by mouth daily 30 tablet 1     omeprazole 20 MG tablet Take 1 tablet (20 mg) by mouth daily Take 30-60 minutes before a meal. 60 tablet 5               Review of Systems:     Review of Systems:  Skin:  Negative     Eyes:       ENT:  Positive for sinus trouble;dental problems  Respiratory:  Negative    Cardiovascular:  Negative;palpitations;chest pain;lightheadedness;dizziness;syncope or near-syncope;cyanosis;edema Positive for;fatigue  Gastroenterology: Negative    Genitourinary:  Negative    Musculoskeletal:  Negative    Neurologic:  Negative    Psychiatric:  Negative    Heme/Lymph/Imm:  Negative    Endocrine:  Positive for thyroid disorder                     Data:   All laboratory data reviewed  Lab Results   Component Value Date    CHOL 192 11/29/2016     Lab Results   Component Value Date    HDL 35 11/29/2016     Lab Results   Component Value Date     11/29/2016     Lab Results   Component Value Date    TRIG 156 11/29/2016     Lab Results   Component Value Date    CHOLHDLRATIO 5.4 02/05/2015     TSH   Date Value Ref Range Status   10/12/2016 3.90 0.40 - 4.00 mU/L Final     Last Basic Metabolic Panel:  Lab Results   Component Value Date     06/13/2018      Lab Results   Component Value Date    POTASSIUM 4.1 06/13/2018     Lab Results   Component Value Date    CHLORIDE 106 06/13/2018     Lab Results   Component Value Date    YOEL 8.8 06/13/2018     Lab Results   Component Value Date    CO2 27 06/13/2018     Lab Results   Component Value Date    BUN 14 06/13/2018     Lab Results   Component Value Date    CR 0.53 06/13/2018     Lab Results   Component Value Date     06/13/2018     Lab Results   Component Value Date    WBC 5.0 07/01/2017     Lab Results   Component Value Date    RBC 5.35 07/01/2017     Lab Results   Component Value Date    HGB 13.3 06/13/2018     Lab Results   Component Value Date    HCT 45.2 07/01/2017      Lab Results   Component Value Date    MCV 85 07/01/2017     Lab Results   Component Value Date    MCH 26.9 07/01/2017     Lab Results   Component Value Date    MCHC 31.9 07/01/2017     Lab Results   Component Value Date    RDW 13.7 07/01/2017     Lab Results   Component Value Date     07/01/2017     Thank you for allowing me to participate in the care of your patient.    Sincerely,     Anthony Lugo MD     [facility]

## 2018-06-27 NOTE — MR AVS SNAPSHOT
After Visit Summary   6/27/2018    Jennifer Stinson    MRN: [id number]           Patient Information     Date Of Birth          1950        Visit Information        Provider Department      6/27/2018 12:30 PM Anthony Lugo MD; [facility]   [place]        Today's Diagnoses     Abnormal nuclear stress test    -  1    Heart murmur           Follow-ups after your visit        Additional Services     Follow-Up with Cardiac Advanced Practice Provider                 Your next 10 appointments already scheduled     Jul 06, 2018 11:00 AM CDT   CTA ANGIOGRAM CORONARY ARTERY with SCICT1   [facility] (Cardiovascular Imaging at [facility])    [facility]  [address]  [place] [address]   [phone]           Please allow two hours for this test.  Follow the instructions below:   The day before your exam, drink extra fluids at least six 8-ounce glasses (unless your doctor wants you to restrict your fluids).   No caffeine and no smoking the day of the test.   Do not eat or drink for 2 hours before your exam. You may take your morning medicines with small sips of water.   If you take Viagra, Levitra or Cialis, stop taking it for 48 hours before your test.  For patients with diabetes:   You may need a blood test (creatinine test) within 30 days of your exam; the Cardiologist/Radiologist may require you to get this test prior to your exam   If you are diabetic and take oral hypoglycemics, do not take them on the day of your test.  Bring any scans or X-rays taken at other hospitals, if similar tests were done. Also bring a list of your medicines, including vitamins, minerals and over-the-counter drugs. It is safest to leave personal items at home.  Be sure to tell your doctor:   If you have any allergies, including any reaction to contrast.   If you are breastfeeding or there is a chance you are pregnant.  Please wear  loose clothing, such as a sweat suit or jogging clothes. Avoid snaps, zippers and other metal. We may ask you to undress and put on a hospital gown.  If you have any questions, please call the Imaging Department where you will have your exam.            Jul 06, 2018  1:45 PM CDT   Ech Complete with [id number]   [facility] CV Echocardiography (Cardiovascular Imaging at [facility])    [address]  [place] [address] [phone]           1. Please bring or wear a comfortable two-piece outfit. 2. You may eat, drink and take your normal medicines. 3. For any questions that cannot be answered, please contact the ordering physician            Jul 20, 2018  1:10 PM CDT   Return Visit with Stephanie Bonilla PA-C   [facility]   [place] ([facility])    [address]  [place] [id number] [phone] OPT 2              Future tests that were ordered for you today     Open Future Orders        Priority Expected Expires Ordered    Echocardiogram Routine 7/4/2018 6/27/2019 6/27/2018    Follow-Up with Cardiac Advanced Practice Provider Routine 7/4/2018 6/27/2019 6/27/2018    CT Angiogram coronary artery Routine 6/28/2018 6/27/2019 6/27/2018            Who to contact     If you have questions or need follow up information about today's clinic visit or your schedule please contact [facility] directly at [phone].  Normal or non-critical lab and imaging results will be communicated to you by MyChart, letter or phone within 4 business days after the clinic has received the results. If you do not hear from us within 7 days, please contact the clinic through [employer] or phone. If you have a critical or abnormal lab result, we will notify you by phone as soon as possible.  Submit refill requests through [employer] or call your pharmacy and they will forward the refill request to us. Please  "allow 3 business days for your refill to be completed.          Additional Information About Your Visit        MyChart Information     [employer] lets you send messages to your doctor, view your test results, renew your prescriptions, schedule appointments and more. To sign up, go to [email or web address] . Click on \"Log in\" on the left side of the screen, which will take you to the Welcome page. Then click on \"Sign up Now\" on the right side of the page.     You will be asked to enter the access code listed below, as well as some personal information. Please follow the directions to create your username and password.     Your access code is: [email or web address]  Expires: 2018  4:18 PM     Your access code will  in 90 days. If you need help or a new code, please call your [place] clinic or [phone].        Care EveryWhere ID     This is your Care EveryWhere ID. This could be used by other organizations to access your [place] medical records  [id number]        Your Vitals Were     Pulse Height BMI (Body Mass Index)             80 1.676 m (5' 6\") 32.17 kg/m2          Blood Pressure from Last 3 Encounters:   18 135/81   18 150/90   17 148/78    Weight from Last 3 Encounters:   18 90.4 kg (199 lb 4.8 oz)   18 91.1 kg (200 lb 14.4 oz)   16 91.7 kg (202 lb 3.2 oz)               Primary Care Provider Office Phone # Fax #    Krishnakumari SEBAS Mcbride [phone]       [address] [facility] [address]        Equal Access to Services     [facility] Hospital[facility] AH: Hadii suyapa Mckay, waluanada luqadaha, qaybta kaalmada prerna, amanda ruth. So [facility] [phone].    ATENCIÓN: Si habla español, tiene a andre disposición servicios gratuitos de asistencia lingüística. Llame al [phone].    We comply with applicable federal civil rights laws and [place] laws. We do not discriminate on the basis of race, color, national origin, " age, disability, sex, sexual orientation, or gender identity.            Thank you!     Thank you for choosing [facility]  for your care. Our goal is always to provide you with excellent care. Hearing back from our patients is one way we can continue to improve our services. Please take a few minutes to complete the written survey that you may receive in the mail after your visit with us. Thank you!             Your Updated Medication List - Protect others around you: Learn how to safely use, store and throw away your medicines at www.disposemymeds.org.          This list is accurate as of 6/27/18  1:29 PM.  Always use your most recent med list.                   Brand Name Dispense Instructions for use Diagnosis    amLODIPine 10 MG tablet    NORVASC    90 tablet    Take 1 tablet (10 mg) by mouth daily    Essential hypertension with goal blood pressure less than 140/90       aspirin 325 MG EC tablet     60 tablet    Take 1 tablet (325 mg) by mouth daily    Chest pain, unspecified type       atorvastatin 40 MG tablet    LIPITOR    30 tablet    Take 1 tablet (40 mg) by mouth daily    Chest pain, unspecified type       levothyroxine 88 MCG tablet    SYNTHROID/LEVOTHROID    90 tablet    Take 1 tablet (88 mcg) by mouth daily    Other specified hypothyroidism       lisinopril 40 MG tablet    PRINIVIL/ZESTRIL    30 tablet    Take 1 tablet (40 mg) by mouth daily    Essential hypertension       metoprolol succinate 50 MG 24 hr tablet    TOPROL-XL    30 tablet    Take 1 tablet (50 mg) by mouth daily    Essential hypertension       omeprazole 20 MG tablet     60 tablet    Take 1 tablet (20 mg) by mouth daily Take 30-60 minutes before a meal.    Gastroesophageal reflux disease without esophagitis

## 2018-06-27 NOTE — PROGRESS NOTES
Cardiology Consultation      Jennifer Stinson MRN# [id number]   YOB: 1950 Age: 68 year old   Date of Visit 06/27/2018     Reason for consult:  Preoperative cardiovascular assessment           Assessment and Plan:     1. Preoperative cardiovascular assessment    Patient is moderate (not elevated) risk for a mild-moderate risk procedure.  She does not need any further cardiovascular testing prior to her sinus surgery.  She has good exercise tolerance without symptoms.  Continue cardiovascular medications as able.      2. Cardiac murmur    Likely not hemodynamically significant.  Recheck echo.  Does not need to be done prior to sinus surgery.      3. Previous history of abnormal nuclear stress test 2016, potentially due to artifact    Follow-up with CT coronary angiogram.  Does not need to be done prior to sinus surgery.    If significant coronary artery disease on CT, may consider going to full dose atorvastatin.    Follow-up after echo and CT coronary angiogram per patient request to review results.    This note was transcribed using electronic voice recognition software, typographical errors may be present.                Chief Complaint:   Cardiac Clearance           History of Present Illness:   This patient is a very pleasant 68 year old female who is here for preoperative cardiovascular clearance for a very minor sinus surgery to remove an implant.  She denies any exertional chest discomfort or dyspnea on exertion.  She can climb 2 flights of stairs without stopping for shortness of breath or chest discomfort.    She has history of stroke with no residual defect.  She is on aspirin for that.  She has had previous workup for face pain that turned out to be because of dental, but as part of that she had an ischemic workup with abnormal nuclear stress test in 2016.  That stress test suggested possible inferior ischemia/infarct, but it was very technically limited.  The patient's family describes that  "they were not very happy with the way tests were ordered.  It sounds like she was scheduled for an angiogram but they wanted to talk to the cardiologist first before going down.  Ultimately, they declined the procedure due to lack of communication.  It did turn out that her symptoms at that time were from dental rather than ischemia.  With addressing that, her discomfort went completely away.    Echo in 2011 did not have any significant structural abnormality.  Normal ejection fraction both on the echo and the nuclear stress test.         Physical Exam:     Vitals: /81 (BP Location: Left arm, Cuff Size: Adult Large)  Pulse 80  Ht 1.676 m (5' 6\")  Wt 90.4 kg (199 lb 4.8 oz)  BMI 32.17 kg/m2  Constitutional:  cooperative, alert and oriented, well developed, well nourished, in no acute distress        Skin:  no apparent skin lesions or masses noted        Head:  normocephalic, no masses or lesions        Eyes:  pupils equal and round        ENT:  no pallor or cyanosis        Neck:  no stiffness        Chest:  normal breath sounds, clear to auscultation, normal A-P diameter, normal symmetry, normal respiratory excursion, no use of accessory muscles        Cardiac: regular rhythm       RUSB;grade 2;early systolic murmur          Abdomen:      benign    Extremities and Back:  no edema   lashanda    Neurological:  no gross motor deficits;affect appropriate                    Past Medical History:   I have reviewed this patient's past medical history  Past Medical History:   Diagnosis Date     GERD (gastroesophageal reflux disease)      Hyperlipidemia LDL goal < 130      Hypertension      Hypothyroidism      Stroke (H)              Past Surgical History:   I have reviewed this patient's past surgical history  Past Surgical History:   Procedure Laterality Date     CHOLECYSTECTOMY                 Social History:   I have reviewed this patient's social history  Social History   Substance Use Topics     Smoking status: " Never Smoker     Smokeless tobacco: Never Used     Alcohol use No             Family History:   I have reviewed this patient's family history  Family History   Problem Relation Age of Onset     Family History Negative Mother      Family History Negative Father      Family History Negative Maternal Grandmother      Family History Negative Maternal Grandfather      Family History Negative Paternal Grandmother      Family History Negative Paternal Grandfather      Diabetes No family hx of      Thyroid Disease No family hx of      Cancer No family hx of              Allergies:     Allergies   Allergen Reactions     No Clinical Screening - See Comments Swelling and Rash     Other reaction(s): Edema  All meat: poultry, beef, etc.  Reaction: swelling  Cinnamon and other spices  Reaction: face swelling, rash  Cinnamon and other spices  Reaction: face swelling, rash  All meat: poultry, beef, etc.  Reaction: swelling     Nuts Other (See Comments) and Rash     All nuts  Reaction: swelling, rash, headache     Peanut-Derived Rash     Other reaction(s): Headache  All nuts  Reaction: swelling, rash, headache             Medications:   I have reviewed this patient's current medications  Current Outpatient Prescriptions   Medication Sig Dispense Refill     amLODIPine (NORVASC) 10 MG tablet Take 1 tablet (10 mg) by mouth daily 90 tablet 1     aspirin  MG EC tablet Take 1 tablet (325 mg) by mouth daily 60 tablet 0     atorvastatin (LIPITOR) 40 MG tablet Take 1 tablet (40 mg) by mouth daily 30 tablet 0     levothyroxine (SYNTHROID/LEVOTHROID) 88 MCG tablet Take 1 tablet (88 mcg) by mouth daily 90 tablet 1     lisinopril (PRINIVIL,ZESTRIL) 40 MG tablet Take 1 tablet (40 mg) by mouth daily 30 tablet 0     metoprolol succinate (TOPROL-XL) 50 MG 24 hr tablet Take 1 tablet (50 mg) by mouth daily 30 tablet 1     omeprazole 20 MG tablet Take 1 tablet (20 mg) by mouth daily Take 30-60 minutes before a meal. 60 tablet 5                Review of Systems:     Review of Systems:  Skin:  Negative     Eyes:       ENT:  Positive for sinus trouble;dental problems  Respiratory:  Negative    Cardiovascular:  Negative;palpitations;chest pain;lightheadedness;dizziness;syncope or near-syncope;cyanosis;edema Positive for;fatigue  Gastroenterology: Negative    Genitourinary:  Negative    Musculoskeletal:  Negative    Neurologic:  Negative    Psychiatric:  Negative    Heme/Lymph/Imm:  Negative    Endocrine:  Positive for thyroid disorder                     Data:   All laboratory data reviewed  Lab Results   Component Value Date    CHOL 192 11/29/2016     Lab Results   Component Value Date    HDL 35 11/29/2016     Lab Results   Component Value Date     11/29/2016     Lab Results   Component Value Date    TRIG 156 11/29/2016     Lab Results   Component Value Date    CHOLHDLRATIO 5.4 02/05/2015     TSH   Date Value Ref Range Status   10/12/2016 3.90 0.40 - 4.00 mU/L Final     Last Basic Metabolic Panel:  Lab Results   Component Value Date     06/13/2018      Lab Results   Component Value Date    POTASSIUM 4.1 06/13/2018     Lab Results   Component Value Date    CHLORIDE 106 06/13/2018     Lab Results   Component Value Date    YOEL 8.8 06/13/2018     Lab Results   Component Value Date    CO2 27 06/13/2018     Lab Results   Component Value Date    BUN 14 06/13/2018     Lab Results   Component Value Date    CR 0.53 06/13/2018     Lab Results   Component Value Date     06/13/2018     Lab Results   Component Value Date    WBC 5.0 07/01/2017     Lab Results   Component Value Date    RBC 5.35 07/01/2017     Lab Results   Component Value Date    HGB 13.3 06/13/2018     Lab Results   Component Value Date    HCT 45.2 07/01/2017     Lab Results   Component Value Date    MCV 85 07/01/2017     Lab Results   Component Value Date    MCH 26.9 07/01/2017     Lab Results   Component Value Date    MCHC 31.9 07/01/2017     Lab Results   Component Value Date    RDW  13.7 07/01/2017     Lab Results   Component Value Date     07/01/2017

## 2018-07-06 ENCOUNTER — HOSPITAL ENCOUNTER (OUTPATIENT)
Dept: CARDIOLOGY | Facility: CLINIC | Age: 68
Discharge: HOME | End: 2018-07-06
Attending: INTERNAL MEDICINE | Admitting: INTERNAL MEDICINE
Payer: MEDICARE

## 2018-07-06 VITALS — HEART RATE: 66 BPM | DIASTOLIC BLOOD PRESSURE: 70 MMHG | SYSTOLIC BLOOD PRESSURE: 116 MMHG

## 2018-07-06 DIAGNOSIS — R94.39 ABNORMAL NUCLEAR STRESS TEST: ICD-10-CM

## 2018-07-06 DIAGNOSIS — R01.1 HEART MURMUR: ICD-10-CM

## 2018-07-06 PROCEDURE — A9270 NON-COVERED ITEM OR SERVICE: HCPCS | Mod: GY | Performed by: INTERNAL MEDICINE

## 2018-07-06 PROCEDURE — 93306 TTE W/DOPPLER COMPLETE: CPT

## 2018-07-06 PROCEDURE — 75574 CT ANGIO HRT W/3D IMAGE: CPT | Mod: 26 | Performed by: INTERNAL MEDICINE

## 2018-07-06 PROCEDURE — 75574 CT ANGIO HRT W/3D IMAGE: CPT

## 2018-07-06 PROCEDURE — 25000128 H RX IP 250 OP 636: Performed by: INTERNAL MEDICINE

## 2018-07-06 PROCEDURE — 93306 TTE W/DOPPLER COMPLETE: CPT | Mod: 26 | Performed by: INTERNAL MEDICINE

## 2018-07-06 PROCEDURE — 25000125 ZZHC RX 250: Performed by: INTERNAL MEDICINE

## 2018-07-06 PROCEDURE — 25000132 ZZH RX MED GY IP 250 OP 250 PS 637: Mod: GY | Performed by: INTERNAL MEDICINE

## 2018-07-06 RX ORDER — ONDANSETRON 2 MG/ML
4 INJECTION INTRAMUSCULAR; INTRAVENOUS
Status: DISCONTINUED | OUTPATIENT
Start: 2018-07-06 | End: 2018-07-07 | Stop reason: HOSPADM

## 2018-07-06 RX ORDER — DIPHENHYDRAMINE HYDROCHLORIDE 50 MG/ML
25-50 INJECTION INTRAMUSCULAR; INTRAVENOUS
Status: DISCONTINUED | OUTPATIENT
Start: 2018-07-06 | End: 2018-07-07 | Stop reason: HOSPADM

## 2018-07-06 RX ORDER — IOPAMIDOL 755 MG/ML
150 INJECTION, SOLUTION INTRAVASCULAR ONCE
Status: COMPLETED | OUTPATIENT
Start: 2018-07-06 | End: 2018-07-06

## 2018-07-06 RX ORDER — METOPROLOL TARTRATE 1 MG/ML
5-15 INJECTION, SOLUTION INTRAVENOUS
Status: DISCONTINUED | OUTPATIENT
Start: 2018-07-06 | End: 2018-07-07 | Stop reason: HOSPADM

## 2018-07-06 RX ORDER — NITROGLYCERIN 0.4 MG/1
0.4 TABLET SUBLINGUAL
Status: DISCONTINUED | OUTPATIENT
Start: 2018-07-06 | End: 2018-07-07 | Stop reason: HOSPADM

## 2018-07-06 RX ORDER — DIPHENHYDRAMINE HCL 25 MG
25 CAPSULE ORAL
Status: DISCONTINUED | OUTPATIENT
Start: 2018-07-06 | End: 2018-07-07 | Stop reason: HOSPADM

## 2018-07-06 RX ORDER — ACYCLOVIR 200 MG/1
0-1 CAPSULE ORAL
Status: DISCONTINUED | OUTPATIENT
Start: 2018-07-06 | End: 2018-07-07 | Stop reason: HOSPADM

## 2018-07-06 RX ORDER — METOPROLOL TARTRATE 50 MG
50-100 TABLET ORAL
Status: COMPLETED | OUTPATIENT
Start: 2018-07-06 | End: 2018-07-06

## 2018-07-06 RX ORDER — METHYLPREDNISOLONE SODIUM SUCCINATE 125 MG/2ML
125 INJECTION, POWDER, LYOPHILIZED, FOR SOLUTION INTRAMUSCULAR; INTRAVENOUS
Status: DISCONTINUED | OUTPATIENT
Start: 2018-07-06 | End: 2018-07-07 | Stop reason: HOSPADM

## 2018-07-06 RX ADMIN — METOPROLOL TARTRATE 10 MG: 5 INJECTION, SOLUTION INTRAVENOUS at 13:17

## 2018-07-06 RX ADMIN — IOPAMIDOL 110 ML: 755 INJECTION, SOLUTION INTRAVENOUS at 13:51

## 2018-07-06 RX ADMIN — METOPROLOL TARTRATE 100 MG: 50 TABLET ORAL at 11:40

## 2018-07-06 RX ADMIN — NITROGLYCERIN 0.4 MG: 0.4 TABLET SUBLINGUAL at 13:13

## 2018-07-06 RX ADMIN — METOPROLOL TARTRATE 10 MG: 5 INJECTION, SOLUTION INTRAVENOUS at 13:05

## 2018-07-06 RX ADMIN — METOPROLOL TARTRATE 10 MG: 5 INJECTION, SOLUTION INTRAVENOUS at 13:22

## 2018-07-06 RX ADMIN — METOPROLOL TARTRATE 10 MG: 5 INJECTION, SOLUTION INTRAVENOUS at 13:10

## 2018-07-07 ENCOUNTER — OFFICE VISIT (OUTPATIENT)
Dept: URGENT CARE | Facility: URGENT CARE | Age: 68
End: 2018-07-07
Payer: MEDICARE

## 2018-07-07 VITALS
HEART RATE: 64 BPM | DIASTOLIC BLOOD PRESSURE: 82 MMHG | SYSTOLIC BLOOD PRESSURE: 128 MMHG | TEMPERATURE: 97.3 F | RESPIRATION RATE: 16 BRPM

## 2018-07-07 DIAGNOSIS — B02.9 HERPES ZOSTER WITHOUT COMPLICATION: Primary | ICD-10-CM

## 2018-07-07 PROCEDURE — 99213 OFFICE O/P EST LOW 20 MIN: CPT | Performed by: FAMILY MEDICINE

## 2018-07-07 RX ORDER — VALACYCLOVIR HYDROCHLORIDE 1 G/1
1000 TABLET, FILM COATED ORAL 3 TIMES DAILY
Qty: 21 TABLET | Refills: 0 | Status: SHIPPED | OUTPATIENT
Start: 2018-07-07 | End: 2018-08-06

## 2018-07-07 NOTE — MR AVS SNAPSHOT
After Visit Summary   7/7/2018    Jennifer Stinson    MRN: [id number]           Patient Information     Date Of Birth          1950        Visit Information        Provider Department      7/7/2018 7:30 PM Eduardo Adam MD [facility]        Today's Diagnoses     Herpes zoster without complication    -  1      Care Instructions      Follow up with your primary care provider if not better in 5-7 days.     Shingles (Herpes Zoster)     Talk to your healthcare provider about the shingles vaccine.     Shingles is also called herpes zoster. It is a painful skin rash caused by the herpes zoster virus. This is the same virus that causes chickenpox. After a person has chickenpox, the virus remains inactive in the nerve cells. Years later, the virus can become active again and travel to the skin. Most people have shingles only once, but it is possible to have it more than once.  What are the risk factors for shingles?  Anyone who has ever had chickenpox can develop shingles. But your risk is greater if you:    Are 50 years of age or older    Have an illness that weakens your immune system, such as HIV/AIDS    Have cancer, especially Hodgkin disease or lymphoma    Take medicines that weaken your immune system  What are the symptoms of shingles?    The first sign of shingles is usually pain, burning, tingling, or itching on one part of your face or body. You may also feel as if you have the flu, with fever and chills.    A red rash with small blisters appears within a few days. The rash may appear as follows:   ? The blisters can occur anywhere, but they re most common on the back, chest, or abdomen.  ? They usually appear on only one side of the body, spreading along the nerve pathway where the virus was inactive.   ? The rash can also form around an eye, along one side of the face or neck, or in the mouth.  ? In a few people, usually those with weakened immune systems, shingles appear on more  than one part of the body at once.    After a few days, the blisters become dry and form a crust. The crust falls off in days to weeks. The blisters generally do not leave scars.  How is shingles treated?  For most people, shingles heals on its own in a few weeks. But treatment is recommended to help relieve pain, speed healing, and reduce the risk of complications. Antiviral medicines are prescribed within the first 72 hours of the appearance of the rash. To lessen symptoms:    Apply ice packs (wrapped in a thin towel) or cool compresses, or soak in a cool bath.    Use calamine lotion to calm itchy skin.    Ask your healthcare provider about over-the-counter pain relievers. If your pain is severe, your healthcare provider may prescribe stronger pain medicines.  What are the complications of shingles?  Shingles often goes away with no lasting effects. But some people have serious problems long after the blisters have healed:    Postherpetic neuralgia. This is the most common complication. It is severe nerve pain at the place where the rash used to be. It can last for months, or even years after you have had shingles. Medicines can be prescribed to help relieve the pain and improve quality of life.    Bacterial infection. Shingles blisters may become infected with bacteria. Antibiotic medicine is used to treat the infection.    Eye problems. A person with shingles on the face should see his or her healthcare provider right away. Shingles can cause serious problems with vision, and even blindness.  Very rarely shingles can also lead to pneumonia, hearing problems, brain inflammation, or even death.   When to seek medical care  Contact your healthcare provider if you experience any of the following:    Symptoms that don t go away with treatment    A rash or blisters near your eye    Increased drainage, fever, or rash after treatment, or severe pain that doesn t go away   How can shingles be prevented?  You can only get  shingles if you have had chickenpox in the past. Those who have never had chickenpox can get the virus from you. Although instead of developing shingles, the person may get chickenpox. Until your blisters form scabs, avoid contact with others, especially the following:    Pregnant women who have never had chickenpox or the vaccine    Infants who were born early (prematurely) or who had low weight at birth    People with weak immune system (for example, people receiving chemotherapy for cancer, people who have had organ transplants, or people with HIV infections)     The shingles vaccine  Shingles vaccines are available to help prevent shingles or make it less painful. Vaccination is recommended for adults 50 and older, even if you've had shingles in the past. Talk with your healthcare provider about the most appropriate time for you to get vaccinated, and which vaccine is best for you.   Date Last Reviewed: 10/1/2016    4311-3508 [employer]. [address] [place] [address]. All rights reserved. This information is not intended as a substitute for professional medical care. Always follow your healthcare professional's instructions.                Follow-ups after your visit        Your next 10 appointments already scheduled     Jul 20, 2018  1:10 PM CDT   Return Visit with Stephanie Bonilla PA-C   [facility] ([facility])    [address] [place] [id number] [phone] OPT 2              Who to contact     If you have questions or need follow up information about today's clinic visit or your schedule please contact [facility] directly at [phone].  Normal or non-critical lab and imaging results will be communicated to you by MyChart, letter or phone within 4 business days after the clinic has received the results. If you do not hear from us within 7 days, please contact the clinic through WOWIOt  "or phone. If you have a critical or abnormal lab result, we will notify you by phone as soon as possible.  Submit refill requests through [employer]bank or call your pharmacy and they will forward the refill request to us. Please allow 3 business days for your refill to be completed.          Additional Information About Your Visit        Excel Business Intelligencehart Information     Rec[employer] lets you send messages to your doctor, view your test results, renew your prescriptions, schedule appointments and more. To sign up, go to [email or web address] . Click on \"Log in\" on the left side of the screen, which will take you to the Welcome page. Then click on \"Sign up Now\" on the right side of the page.     You will be asked to enter the access code listed below, as well as some personal information. Please follow the directions to create your username and password.     Your access code is: [email or web address]  Expires: 2018  4:18 PM     Your access code will  in 90 days. If you need help or a new code, please call your [place] clinic or [phone].        Care EveryWhere ID     This is your Care EveryWhere ID. This could be used by other organizations to access your [place] medical records  [id number]        Your Vitals Were     Pulse Temperature Respirations             64 97.3  F (36.3  C) (Tympanic) 16          Blood Pressure from Last 3 Encounters:   18 128/82   18 116/70   18 135/81    Weight from Last 3 Encounters:   18 199 lb 4.8 oz (90.4 kg)   18 200 lb 14.4 oz (91.1 kg)   16 202 lb 3.2 oz (91.7 kg)              Today, you had the following     No orders found for display         Today's Medication Changes          These changes are accurate as of 18  7:53 PM.  If you have any questions, ask your nurse or doctor.               Start taking these medicines.        Dose/Directions    valACYclovir 1000 mg tablet   Commonly known as:  VALTREX   Used for:  Herpes zoster without complication "   Started by:  Eduardo Adam MD        Dose:  1000 mg   Take 1 tablet (1,000 mg) by mouth 3 times daily   Quantity:  21 tablet   Refills:  0            Where to get your medicines      Some of these will need a paper prescription and others can be bought over the counter.  Ask your nurse if you have questions.     Bring a paper prescription for each of these medications     valACYclovir 1000 mg tablet                Primary Care Provider Office Phone # Fax #    Lucia MULLEN MD [place] [phone]       [address] [facility] [address]        Equal Access to Services     [facility]: Hadii aad ku hadasho Soomaali, waaxda luqadaha, qaybta kaalmada adedaleyavelasquez, amanda sabillon . So [facility] [phone].    ATENCIÓN: Si habla español, tiene a andre disposición servicios gratuitos de asistencia lingüística. [facility] [phone].    We comply with applicable federal civil rights laws and [place] laws. We do not discriminate on the basis of race, color, national origin, age, disability, sex, sexual orientation, or gender identity.            Thank you!     Thank you for choosing [facility] [place] [facility]  for your care. Our goal is always to provide you with excellent care. Hearing back from our patients is one way we can continue to improve our services. Please take a few minutes to complete the written survey that you may receive in the mail after your visit with us. Thank you!             Your Updated Medication List - Protect others around you: Learn how to safely use, store and throw away your medicines at www.disposemymeds.org.          This list is accurate as of 7/7/18  7:53 PM.  Always use your most recent med list.                   Brand Name Dispense Instructions for use Diagnosis    amLODIPine 10 MG tablet    NORVASC    90 tablet    Take 1 tablet (10 mg) by mouth daily    Essential hypertension with goal blood pressure less than 140/90       aspirin 325 MG EC  tablet     60 tablet    Take 1 tablet (325 mg) by mouth daily    Chest pain, unspecified type       atorvastatin 40 MG tablet    LIPITOR    30 tablet    Take 1 tablet (40 mg) by mouth daily    Chest pain, unspecified type       levothyroxine 88 MCG tablet    SYNTHROID/LEVOTHROID    90 tablet    Take 1 tablet (88 mcg) by mouth daily    Other specified hypothyroidism       lisinopril 40 MG tablet    PRINIVIL/ZESTRIL    30 tablet    Take 1 tablet (40 mg) by mouth daily    Essential hypertension       metoprolol succinate 50 MG 24 hr tablet    TOPROL-XL    30 tablet    Take 1 tablet (50 mg) by mouth daily    Essential hypertension       omeprazole 20 MG tablet     60 tablet    Take 1 tablet (20 mg) by mouth daily Take 30-60 minutes before a meal.    Gastroesophageal reflux disease without esophagitis       valACYclovir 1000 mg tablet    VALTREX    21 tablet    Take 1 tablet (1,000 mg) by mouth 3 times daily    Herpes zoster without complication

## 2018-07-08 NOTE — PROGRESS NOTES
SUBJECTIVE:  Jennifer Stinson is a 68 year old female who presents to the clinic today for a painful blistery rash on the left upper back.  Onset of rash was yesterday.   Rash is worsening and expanding.  Location of the rash: left upper back.  .  Quality/symptoms of rash: pain   .  Previous history of a similar rash? no  Recent exposure history: none. .    Past Medical History:   Diagnosis Date     GERD (gastroesophageal reflux disease)      Hyperlipidemia LDL goal < 130      Hypertension      Hypothyroidism      Stroke (H)      Current Outpatient Prescriptions   Medication Sig Dispense Refill     amLODIPine (NORVASC) 10 MG tablet Take 1 tablet (10 mg) by mouth daily 90 tablet 1     aspirin  MG EC tablet Take 1 tablet (325 mg) by mouth daily 60 tablet 0     atorvastatin (LIPITOR) 40 MG tablet Take 1 tablet (40 mg) by mouth daily 30 tablet 0     levothyroxine (SYNTHROID/LEVOTHROID) 88 MCG tablet Take 1 tablet (88 mcg) by mouth daily 90 tablet 1     lisinopril (PRINIVIL,ZESTRIL) 40 MG tablet Take 1 tablet (40 mg) by mouth daily 30 tablet 0     metoprolol succinate (TOPROL-XL) 50 MG 24 hr tablet Take 1 tablet (50 mg) by mouth daily 30 tablet 1     omeprazole 20 MG tablet Take 1 tablet (20 mg) by mouth daily Take 30-60 minutes before a meal. 60 tablet 5     Social History   Substance Use Topics     Smoking status: Never Smoker     Smokeless tobacco: Never Used     Alcohol use No       ROS:  Review of systems negative except as stated above.    EXAM:   /82  Pulse 64  Temp 97.3  F (36.3  C) (Tympanic)  Resp 16  GENERAL: alert, and patient is in pain.   SKIN: Rash description:    Distribution: dermatomal  Location: left superior back    ,  Lesion type: closely spaced vesicular lesions on a red background, blotchy with no other findings      ASSESSMENT:  Shingles (Herpes Zoster)    PLAN:  1) Rx:  Valacyclovir  2) Follow-up with primary clinic if not improving in 5-7 days.   3) tylenol    Eduardo Adam MD

## 2018-07-08 NOTE — PATIENT INSTRUCTIONS
Follow up with your primary care provider if not better in 5-7 days.     Shingles (Herpes Zoster)     Talk to your healthcare provider about the shingles vaccine.     Shingles is also called herpes zoster. It is a painful skin rash caused by the herpes zoster virus. This is the same virus that causes chickenpox. After a person has chickenpox, the virus remains inactive in the nerve cells. Years later, the virus can become active again and travel to the skin. Most people have shingles only once, but it is possible to have it more than once.  What are the risk factors for shingles?  Anyone who has ever had chickenpox can develop shingles. But your risk is greater if you:    Are 50 years of age or older    Have an illness that weakens your immune system, such as HIV/AIDS    Have cancer, especially Hodgkin disease or lymphoma    Take medicines that weaken your immune system  What are the symptoms of shingles?    The first sign of shingles is usually pain, burning, tingling, or itching on one part of your face or body. You may also feel as if you have the flu, with fever and chills.    A red rash with small blisters appears within a few days. The rash may appear as follows:   ? The blisters can occur anywhere, but they re most common on the back, chest, or abdomen.  ? They usually appear on only one side of the body, spreading along the nerve pathway where the virus was inactive.   ? The rash can also form around an eye, along one side of the face or neck, or in the mouth.  ? In a few people, usually those with weakened immune systems, shingles appear on more than one part of the body at once.    After a few days, the blisters become dry and form a crust. The crust falls off in days to weeks. The blisters generally do not leave scars.  How is shingles treated?  For most people, shingles heals on its own in a few weeks. But treatment is recommended to help relieve pain, speed healing, and reduce the risk of  complications. Antiviral medicines are prescribed within the first 72 hours of the appearance of the rash. To lessen symptoms:    Apply ice packs (wrapped in a thin towel) or cool compresses, or soak in a cool bath.    Use calamine lotion to calm itchy skin.    Ask your healthcare provider about over-the-counter pain relievers. If your pain is severe, your healthcare provider may prescribe stronger pain medicines.  What are the complications of shingles?  Shingles often goes away with no lasting effects. But some people have serious problems long after the blisters have healed:    Postherpetic neuralgia. This is the most common complication. It is severe nerve pain at the place where the rash used to be. It can last for months, or even years after you have had shingles. Medicines can be prescribed to help relieve the pain and improve quality of life.    Bacterial infection. Shingles blisters may become infected with bacteria. Antibiotic medicine is used to treat the infection.    Eye problems. A person with shingles on the face should see his or her healthcare provider right away. Shingles can cause serious problems with vision, and even blindness.  Very rarely shingles can also lead to pneumonia, hearing problems, brain inflammation, or even death.   When to seek medical care  Contact your healthcare provider if you experience any of the following:    Symptoms that don t go away with treatment    A rash or blisters near your eye    Increased drainage, fever, or rash after treatment, or severe pain that doesn t go away   How can shingles be prevented?  You can only get shingles if you have had chickenpox in the past. Those who have never had chickenpox can get the virus from you. Although instead of developing shingles, the person may get chickenpox. Until your blisters form scabs, avoid contact with others, especially the following:    Pregnant women who have never had chickenpox or the vaccine    Infants who were  born early (prematurely) or who had low weight at birth    People with weak immune system (for example, people receiving chemotherapy for cancer, people who have had organ transplants, or people with HIV infections)     The shingles vaccine  Shingles vaccines are available to help prevent shingles or make it less painful. Vaccination is recommended for adults 50 and older, even if you've had shingles in the past. Talk with your healthcare provider about the most appropriate time for you to get vaccinated, and which vaccine is best for you.   Date Last Reviewed: 10/1/2016    1601-4539 [employer]. [address] [place] [address]. All rights reserved. This information is not intended as a substitute for professional medical care. Always follow your healthcare professional's instructions.

## 2018-07-20 ENCOUNTER — OFFICE VISIT (OUTPATIENT)
Dept: CARDIOLOGY | Facility: CLINIC | Age: 68
End: 2018-07-20
Attending: INTERNAL MEDICINE
Payer: MEDICARE

## 2018-07-20 VITALS
DIASTOLIC BLOOD PRESSURE: 78 MMHG | WEIGHT: 201 LBS | BODY MASS INDEX: 32.3 KG/M2 | HEART RATE: 78 BPM | HEIGHT: 66 IN | SYSTOLIC BLOOD PRESSURE: 148 MMHG

## 2018-07-20 DIAGNOSIS — R94.39 ABNORMAL NUCLEAR STRESS TEST: ICD-10-CM

## 2018-07-20 PROCEDURE — 99214 OFFICE O/P EST MOD 30 MIN: CPT | Performed by: PHYSICIAN ASSISTANT

## 2018-07-20 NOTE — PATIENT INSTRUCTIONS
Thank you for your [facility] visit today. Your provider has recommended the following:  Medication Changes:  No medication changes today.   Recommendations:  Continue to monitor you blood pressure at home.   Follow-up:  See us back for cardiology follow up as needed.  Reminder:  Please bring in all current medications, over the counter supplements and vitamin bottles to your next appointment.            [facility]

## 2018-07-20 NOTE — MR AVS SNAPSHOT
"              After Visit Summary   7/20/2018    Jennifer Stinson    MRN: [id number]           Patient Information     Date Of Birth          1950        Visit Information        Provider Department      7/20/2018 12:55 PM Stephanie Bonilla PA-C; [employer] [facility]   [place]        Today's Diagnoses     Abnormal nuclear stress test          Care Instructions    Thank you for your [facility] visit today. Your provider has recommended the following:  Medication Changes:  No medication changes today.   Recommendations:  Continue to monitor you blood pressure at home.   Follow-up:  See us back for cardiology follow up as needed.  Reminder:  Please bring in all current medications, over the counter supplements and vitamin bottles to your next appointment.            [facility]            Follow-ups after your visit        Who to contact     If you have questions or need follow up information about today's clinic visit or your schedule please contact [facility] directly at [phone].  Normal or non-critical lab and imaging results will be communicated to you by bitHoundhart, letter or phone within 4 business days after the clinic has received the results. If you do not hear from us within 7 days, please contact the clinic through Net 263t or phone. If you have a critical or abnormal lab result, we will notify you by phone as soon as possible.  Submit refill requests through [employer] or call your pharmacy and they will forward the refill request to us. Please allow 3 business days for your refill to be completed.          Additional Information About Your Visit        bitHoundhart Information     [employer] lets you send messages to your doctor, view your test results, renew your prescriptions, schedule appointments and more. To sign up, go to [email or web address] . Click on \"Log in\" on the left side " "of the screen, which will take you to the Welcome page. Then click on \"Sign up Now\" on the right side of the page.     You will be asked to enter the access code listed below, as well as some personal information. Please follow the directions to create your username and password.     Your access code is: [email or web address]  Expires: 2018  4:18 PM     Your access code will  in 90 days. If you need help or a new code, please call your [place] clinic or [phone].        Care EveryWhere ID     This is your Care EveryWhere ID. This could be used by other organizations to access your [place] medical records  [id number]        Your Vitals Were     Pulse Height BMI (Body Mass Index)             78 1.676 m (5' 6\") 32.44 kg/m2          Blood Pressure from Last 3 Encounters:   18 148/78   18 128/82   18 116/70    Weight from Last 3 Encounters:   18 91.2 kg (201 lb)   18 90.4 kg (199 lb 4.8 oz)   18 91.1 kg (200 lb 14.4 oz)              We Performed the Following     Follow-Up with Cardiac Advanced Practice Provider        Primary Care Provider Office Phone # Fax #    Randyksohaili SEBAS Mcbride [phone]       [address] [facility] [address]        Equal Access to Services     [facility]: Hadii aad ku hadasho Soomaali, waaxda luqadaha, qaybta kaalmada adeegyada, amanda sabillon . So [facility] [phone].    ATENCIÓN: Si habla español, tiene a andre disposición servicios gratuitos de asistencia lingüística. Llame al [phone].    We comply with applicable federal civil rights laws and [place] laws. We do not discriminate on the basis of race, color, national origin, age, disability, sex, sexual orientation, or gender identity.            Thank you!     Thank you for choosing [facility]   [facility]  for your care. Our goal is always to provide you with excellent care. Hearing back from our patients is " one way we can continue to improve our services. Please take a few minutes to complete the written survey that you may receive in the mail after your visit with us. Thank you!             Your Updated Medication List - Protect others around you: Learn how to safely use, store and throw away your medicines at www.disposemymeds.org.          This list is accurate as of 7/20/18  1:43 PM.  Always use your most recent med list.                   Brand Name Dispense Instructions for use Diagnosis    amLODIPine 10 MG tablet    NORVASC    90 tablet    Take 1 tablet (10 mg) by mouth daily    Essential hypertension with goal blood pressure less than 140/90       aspirin 325 MG EC tablet     60 tablet    Take 1 tablet (325 mg) by mouth daily    Chest pain, unspecified type       atorvastatin 40 MG tablet    LIPITOR    30 tablet    Take 1 tablet (40 mg) by mouth daily    Chest pain, unspecified type       levothyroxine 88 MCG tablet    SYNTHROID/LEVOTHROID    90 tablet    Take 1 tablet (88 mcg) by mouth daily    Other specified hypothyroidism       lisinopril 40 MG tablet    PRINIVIL/ZESTRIL    30 tablet    Take 1 tablet (40 mg) by mouth daily    Essential hypertension       metoprolol succinate 50 MG 24 hr tablet    TOPROL-XL    30 tablet    Take 1 tablet (50 mg) by mouth daily    Essential hypertension       omeprazole 20 MG tablet     60 tablet    Take 1 tablet (20 mg) by mouth daily Take 30-60 minutes before a meal.    Gastroesophageal reflux disease without esophagitis       valACYclovir 1000 mg tablet    VALTREX    21 tablet    Take 1 tablet (1,000 mg) by mouth 3 times daily    Herpes zoster without complication

## 2018-07-20 NOTE — LETTER
7/20/2018    Lucia Mcbride MD  [address] [facility] [address]    RE: Jennifer Stinson       Dear Colleague,    I had the pleasure of seeing Jennifer Stinson in the [facility].      Cardiology Progress Note    Date of Service: 07/20/2018  Patient seen today in follow up of: rest results  Primary cardiologist: Dr. Lugo    HPI:  Jennifer Stinson is a very pleasant 68 year old female who was recently evaluated by Dr. Lugo on 6/27 for preoperative cardiovascular clearance prior to minor sinus surgery.  She has a history of a stroke without significant residual defects and is maintained on aspirin.  Previously, she had some face pain that turned out to be related to a dental issue but as a workup had an abnormal nuclear stress test in 2016.  The test was limited but it suggested possible inferior ischemia/infarct.  An angiogram was discussed but was ultimately never done.  She an echocardiogram in 2011 that showed no significant structural or abnormality.  Her LVEF was normal.    She is able to climb 2 flights of stairs without difficulties.  She has not had any complaints of chest discomfort or exertional dyspnea.  Dr. Lugo recommended a echocardiogram for evaluation of a murmur as well as a CT coronary angiogram for further evaluation, although neither of these were needed to be done prior to surgery     She is here today to follow-up on those results. She is seen today with the assistance of an . Echocardiogram on 7/6 at age 20 showed a preserved LVEF of 60-65%, no regional wall motion abnormalities, grade 1 diastolic dysfunction.  The RV was normal.  There was trace to mild mitral and tricuspid regurgitation.  No other significant findings were noted.  She had a CT coronary angiogram is well.  Her total calcium score was 51 placing her in the 71st percentile compared to age and gender match controls.  There is mild to moderate coronary artery disease noted but no  significant obstructive disease.    She continues to deny chest discomfort, shortness of breath or any other new symptoms.     ASSESSMENT/PLAN:  1.  Coronary artery disease.  As demonstrated by CT coronary angiogram.  No obstructive coronary disease was noted.  Total calcium score was 51. We discussed this result in detail today. She is already on aspirin and statin. We discussed risk factor modification going forward. I did not set up routine follow up but we would be happy to see her back in cardiology clinic as needed in the future.  2.  Hypertension. This historically appears to be fairly well controlled on her current regiment. Her BP is a bit elevated today which she relates to being late to this appointment. I encouraged her to monitor her blood pressure at home and call if her readings are consistently greater than 140/90. She is on the maximum dose of lisinopril and amlodipine but has room to increase her metoprolol.   3.  Heart murmur. Echocardiogram shows preserved LVEF with trace to mild mitral and tricuspid regurgitation.     This note was completed in part using Dragon voice recognition software. Although reviewed after completion, some word and grammatical errors may occur.    Orders this Visit:  No orders of the defined types were placed in this encounter.    No orders of the defined types were placed in this encounter.    There are no discontinued medications.    CURRENT MEDICATIONS:  Current Outpatient Prescriptions   Medication Sig Dispense Refill     amLODIPine (NORVASC) 10 MG tablet Take 1 tablet (10 mg) by mouth daily 90 tablet 1     aspirin  MG EC tablet Take 1 tablet (325 mg) by mouth daily 60 tablet 0     atorvastatin (LIPITOR) 40 MG tablet Take 1 tablet (40 mg) by mouth daily 30 tablet 0     levothyroxine (SYNTHROID/LEVOTHROID) 88 MCG tablet Take 1 tablet (88 mcg) by mouth daily 90 tablet 1     lisinopril (PRINIVIL,ZESTRIL) 40 MG tablet Take 1 tablet (40 mg) by mouth daily 30 tablet 0      metoprolol succinate (TOPROL-XL) 50 MG 24 hr tablet Take 1 tablet (50 mg) by mouth daily 30 tablet 1     omeprazole 20 MG tablet Take 1 tablet (20 mg) by mouth daily Take 30-60 minutes before a meal. 60 tablet 5     valACYclovir (VALTREX) 1000 mg tablet Take 1 tablet (1,000 mg) by mouth 3 times daily (Patient not taking: Reported on 7/20/2018) 21 tablet 0     ALLERGIES  Allergies   Allergen Reactions     No Clinical Screening - See Comments Swelling and Rash     Other reaction(s): Edema  All meat: poultry, beef, etc.  Reaction: swelling  Cinnamon and other spices  Reaction: face swelling, rash  Cinnamon and other spices  Reaction: face swelling, rash  All meat: poultry, beef, etc.  Reaction: swelling     Nuts Other (See Comments) and Rash     All nuts  Reaction: swelling, rash, headache     Peanut-Derived Rash     Other reaction(s): Headache  All nuts  Reaction: swelling, rash, headache     PAST MEDICAL HISTORY:  Past Medical History:   Diagnosis Date     GERD (gastroesophageal reflux disease)      Hyperlipidemia LDL goal < 130      Hypertension      Hypothyroidism      Stroke (H)      PAST SURGICAL HISTORY:  Past Surgical History:   Procedure Laterality Date     CHOLECYSTECTOMY       FAMILY HISTORY:  Family History   Problem Relation Age of Onset     Family History Negative Mother      Family History Negative Father      Family History Negative Maternal Grandmother      Family History Negative Maternal Grandfather      Family History Negative Paternal Grandmother      Family History Negative Paternal Grandfather      Diabetes No family hx of      Thyroid Disease No family hx of      Cancer No family hx of      SOCIAL HISTORY:  Social History     Social History     Marital status:      Spouse name: N/A     Number of children: N/A     Years of education: N/A     Social History Main Topics     Smoking status: Never Smoker     Smokeless tobacco: Never Used     Alcohol use No     Drug use: No     Sexual  "activity: Not Currently     Other Topics Concern     Not on file     Social History Narrative    ** Merged History Encounter **          Review of Systems:  Skin:  Positive for (shingles)     Eyes:  Positive for glasses  ENT:  Positive for sinus trouble;dental problems  Respiratory:  Negative    Cardiovascular:  Negative;palpitations;chest pain;lightheadedness;dizziness;syncope or near-syncope;cyanosis;edema Positive for;fatigue (improved)  Gastroenterology: Positive for heartburn  Genitourinary:  Negative    Musculoskeletal:  Negative    Neurologic:  Negative    Psychiatric:  Negative    Heme/Lymph/Imm:  Negative    Endocrine:  Positive for thyroid disorder     Physical Exam:  Vitals: /78  Pulse 78  Ht 1.676 m (5' 6\")  Wt 91.2 kg (201 lb)  BMI 32.44 kg/m2   Wt Readings from Last 4 Encounters:   07/20/18 91.2 kg (201 lb)   06/27/18 90.4 kg (199 lb 4.8 oz)   06/13/18 91.1 kg (200 lb 14.4 oz)   11/28/16 91.7 kg (202 lb 3.2 oz)     GEN: well nourished, in no acute distress.  HEENT:  Pupils equal, round. Sclerae nonicteric.   EXTREM: No LE edema.  NEURO: Alert and oriented, cooperative.  SKIN: Warm and dry.     Recent Lab Results:  LIPID RESULTS:  Lab Results   Component Value Date    CHOL 192 11/29/2016    HDL 35 (L) 11/29/2016     (H) 11/29/2016    TRIG 156 (H) 11/29/2016    CHOLHDLRATIO 5.4 (H) 02/05/2015     LIVER ENZYME RESULTS:  Lab Results   Component Value Date    AST 13 11/28/2016    ALT 19 11/28/2016     CBC RESULTS:  Lab Results   Component Value Date    WBC 5.0 07/01/2017    RBC 5.35 (H) 07/01/2017    HGB 13.3 06/13/2018    HCT 45.2 07/01/2017    MCV 85 07/01/2017    MCH 26.9 07/01/2017    MCHC 31.9 07/01/2017    RDW 13.7 07/01/2017     07/01/2017     BMP RESULTS:  Lab Results   Component Value Date     06/13/2018    POTASSIUM 4.1 06/13/2018    CHLORIDE 106 06/13/2018    CO2 27 06/13/2018    ANIONGAP 10 06/13/2018     (H) 06/13/2018    BUN 14 06/13/2018    CR 0.53 " 06/13/2018    GFRESTIMATED >90 06/13/2018    GFRESTBLACK >90 06/13/2018    YOEL 8.8 06/13/2018      A1C RESULTS:  Lab Results   Component Value Date    A1C 5.5 11/25/2011     INR RESULTS:  Lab Results   Component Value Date    INR 0.96 11/25/2011       Stephanie Bonilla PA-C  [facility]    Thank you for allowing me to participate in the care of your patient.    Sincerely,     Stephanie Bonilla PA-C     [facility]

## 2018-07-20 NOTE — PROGRESS NOTES
Cardiology Progress Note    Date of Service: 07/20/2018  Patient seen today in follow up of: rest results  Primary cardiologist: Dr. Lugo    HPI:  Jennifer Stinson is a very pleasant 68 year old female who was recently evaluated by Dr. Lugo on 6/27 for preoperative cardiovascular clearance prior to minor sinus surgery.  She has a history of a stroke without significant residual defects and is maintained on aspirin.  Previously, she had some face pain that turned out to be related to a dental issue but as a workup had an abnormal nuclear stress test in 2016.  The test was limited but it suggested possible inferior ischemia/infarct.  An angiogram was discussed but was ultimately never done.  She an echocardiogram in 2011 that showed no significant structural or abnormality.  Her LVEF was normal.    She is able to climb 2 flights of stairs without difficulties.  She has not had any complaints of chest discomfort or exertional dyspnea.  Dr. Lugo recommended a echocardiogram for evaluation of a murmur as well as a CT coronary angiogram for further evaluation, although neither of these were needed to be done prior to surgery     She is here today to follow-up on those results. She is seen today with the assistance of an . Echocardiogram on 7/6 at age 20 showed a preserved LVEF of 60-65%, no regional wall motion abnormalities, grade 1 diastolic dysfunction.  The RV was normal.  There was trace to mild mitral and tricuspid regurgitation.  No other significant findings were noted.  She had a CT coronary angiogram is well.  Her total calcium score was 51 placing her in the 71st percentile compared to age and gender match controls.  There is mild to moderate coronary artery disease noted but no significant obstructive disease.    She continues to deny chest discomfort, shortness of breath or any other new symptoms.     ASSESSMENT/PLAN:  1.  Coronary artery disease.  As demonstrated by CT coronary angiogram.  No  obstructive coronary disease was noted.  Total calcium score was 51. We discussed this result in detail today. She is already on aspirin and statin. We discussed risk factor modification going forward. I did not set up routine follow up but we would be happy to see her back in cardiology clinic as needed in the future.  2.  Hypertension. This historically appears to be fairly well controlled on her current regiment. Her BP is a bit elevated today which she relates to being late to this appointment. I encouraged her to monitor her blood pressure at home and call if her readings are consistently greater than 140/90. She is on the maximum dose of lisinopril and amlodipine but has room to increase her metoprolol.   3.  Heart murmur. Echocardiogram shows preserved LVEF with trace to mild mitral and tricuspid regurgitation.     This note was completed in part using Dragon voice recognition software. Although reviewed after completion, some word and grammatical errors may occur.    Orders this Visit:  No orders of the defined types were placed in this encounter.    No orders of the defined types were placed in this encounter.    There are no discontinued medications.    CURRENT MEDICATIONS:  Current Outpatient Prescriptions   Medication Sig Dispense Refill     amLODIPine (NORVASC) 10 MG tablet Take 1 tablet (10 mg) by mouth daily 90 tablet 1     aspirin  MG EC tablet Take 1 tablet (325 mg) by mouth daily 60 tablet 0     atorvastatin (LIPITOR) 40 MG tablet Take 1 tablet (40 mg) by mouth daily 30 tablet 0     levothyroxine (SYNTHROID/LEVOTHROID) 88 MCG tablet Take 1 tablet (88 mcg) by mouth daily 90 tablet 1     lisinopril (PRINIVIL,ZESTRIL) 40 MG tablet Take 1 tablet (40 mg) by mouth daily 30 tablet 0     metoprolol succinate (TOPROL-XL) 50 MG 24 hr tablet Take 1 tablet (50 mg) by mouth daily 30 tablet 1     omeprazole 20 MG tablet Take 1 tablet (20 mg) by mouth daily Take 30-60 minutes before a meal. 60 tablet 5      valACYclovir (VALTREX) 1000 mg tablet Take 1 tablet (1,000 mg) by mouth 3 times daily (Patient not taking: Reported on 7/20/2018) 21 tablet 0     ALLERGIES  Allergies   Allergen Reactions     No Clinical Screening - See Comments Swelling and Rash     Other reaction(s): Edema  All meat: poultry, beef, etc.  Reaction: swelling  Cinnamon and other spices  Reaction: face swelling, rash  Cinnamon and other spices  Reaction: face swelling, rash  All meat: poultry, beef, etc.  Reaction: swelling     Nuts Other (See Comments) and Rash     All nuts  Reaction: swelling, rash, headache     Peanut-Derived Rash     Other reaction(s): Headache  All nuts  Reaction: swelling, rash, headache     PAST MEDICAL HISTORY:  Past Medical History:   Diagnosis Date     GERD (gastroesophageal reflux disease)      Hyperlipidemia LDL goal < 130      Hypertension      Hypothyroidism      Stroke (H)      PAST SURGICAL HISTORY:  Past Surgical History:   Procedure Laterality Date     CHOLECYSTECTOMY       FAMILY HISTORY:  Family History   Problem Relation Age of Onset     Family History Negative Mother      Family History Negative Father      Family History Negative Maternal Grandmother      Family History Negative Maternal Grandfather      Family History Negative Paternal Grandmother      Family History Negative Paternal Grandfather      Diabetes No family hx of      Thyroid Disease No family hx of      Cancer No family hx of      SOCIAL HISTORY:  Social History     Social History     Marital status:      Spouse name: N/A     Number of children: N/A     Years of education: N/A     Social History Main Topics     Smoking status: Never Smoker     Smokeless tobacco: Never Used     Alcohol use No     Drug use: No     Sexual activity: Not Currently     Other Topics Concern     Not on file     Social History Narrative    ** Merged History Encounter **          Review of Systems:  Skin:  Positive for (shingles)     Eyes:  Positive for glasses  ENT:  " Positive for sinus trouble;dental problems  Respiratory:  Negative    Cardiovascular:  Negative;palpitations;chest pain;lightheadedness;dizziness;syncope or near-syncope;cyanosis;edema Positive for;fatigue (improved)  Gastroenterology: Positive for heartburn  Genitourinary:  Negative    Musculoskeletal:  Negative    Neurologic:  Negative    Psychiatric:  Negative    Heme/Lymph/Imm:  Negative    Endocrine:  Positive for thyroid disorder     Physical Exam:  Vitals: /78  Pulse 78  Ht 1.676 m (5' 6\")  Wt 91.2 kg (201 lb)  BMI 32.44 kg/m2   Wt Readings from Last 4 Encounters:   07/20/18 91.2 kg (201 lb)   06/27/18 90.4 kg (199 lb 4.8 oz)   06/13/18 91.1 kg (200 lb 14.4 oz)   11/28/16 91.7 kg (202 lb 3.2 oz)     GEN: well nourished, in no acute distress.  HEENT:  Pupils equal, round. Sclerae nonicteric.   EXTREM: No LE edema.  NEURO: Alert and oriented, cooperative.  SKIN: Warm and dry.     Recent Lab Results:  LIPID RESULTS:  Lab Results   Component Value Date    CHOL 192 11/29/2016    HDL 35 (L) 11/29/2016     (H) 11/29/2016    TRIG 156 (H) 11/29/2016    CHOLHDLRATIO 5.4 (H) 02/05/2015     LIVER ENZYME RESULTS:  Lab Results   Component Value Date    AST 13 11/28/2016    ALT 19 11/28/2016     CBC RESULTS:  Lab Results   Component Value Date    WBC 5.0 07/01/2017    RBC 5.35 (H) 07/01/2017    HGB 13.3 06/13/2018    HCT 45.2 07/01/2017    MCV 85 07/01/2017    MCH 26.9 07/01/2017    MCHC 31.9 07/01/2017    RDW 13.7 07/01/2017     07/01/2017     BMP RESULTS:  Lab Results   Component Value Date     06/13/2018    POTASSIUM 4.1 06/13/2018    CHLORIDE 106 06/13/2018    CO2 27 06/13/2018    ANIONGAP 10 06/13/2018     (H) 06/13/2018    BUN 14 06/13/2018    CR 0.53 06/13/2018    GFRESTIMATED >90 06/13/2018    GFRESTBLACK >90 06/13/2018    YOEL 8.8 06/13/2018      A1C RESULTS:  Lab Results   Component Value Date    A1C 5.5 11/25/2011     INR RESULTS:  Lab Results   Component Value Date    INR " 0.96 11/25/2011       Stephanie Bonilla PA-C  [facility]

## 2018-07-24 ENCOUNTER — TELEPHONE (OUTPATIENT)
Dept: INTERNAL MEDICINE | Facility: CLINIC | Age: 68
End: 2018-07-24

## 2018-07-24 NOTE — TELEPHONE ENCOUNTER
Anali ( [phone]) called. Stated [facility] would not clear patient for surgery til she saw a Cardio. Anali stated patient did see Cardio and has been cleared for surgery. Patient is wondering if she would need another pre op?      Pre op done on 6/13/18      Cardio appointment-7/20/18

## 2018-08-06 ENCOUNTER — OFFICE VISIT (OUTPATIENT)
Dept: INTERNAL MEDICINE | Facility: CLINIC | Age: 68
End: 2018-08-06
Payer: MEDICARE

## 2018-08-06 VITALS
SYSTOLIC BLOOD PRESSURE: 132 MMHG | RESPIRATION RATE: 14 BRPM | DIASTOLIC BLOOD PRESSURE: 74 MMHG | BODY MASS INDEX: 32.3 KG/M2 | HEIGHT: 66 IN | OXYGEN SATURATION: 98 % | HEART RATE: 87 BPM | TEMPERATURE: 98.1 F | WEIGHT: 201 LBS

## 2018-08-06 DIAGNOSIS — Z01.818 PREOP GENERAL PHYSICAL EXAM: Primary | ICD-10-CM

## 2018-08-06 LAB
ERYTHROCYTE [DISTWIDTH] IN BLOOD BY AUTOMATED COUNT: 14.5 % (ref 10–15)
HCT VFR BLD AUTO: 41.5 % (ref 35–47)
HGB BLD-MCNC: 13.7 G/DL (ref 11.7–15.7)
MCH RBC QN AUTO: 28.1 PG (ref 26.5–33)
MCHC RBC AUTO-ENTMCNC: 33 G/DL (ref 31.5–36.5)
MCV RBC AUTO: 85 FL (ref 78–100)
PLATELET # BLD AUTO: 285 10E9/L (ref 150–450)
RBC # BLD AUTO: 4.88 10E12/L (ref 3.8–5.2)
WBC # BLD AUTO: 5.6 10E9/L (ref 4–11)

## 2018-08-06 PROCEDURE — 85027 COMPLETE CBC AUTOMATED: CPT | Performed by: NURSE PRACTITIONER

## 2018-08-06 PROCEDURE — 36415 COLL VENOUS BLD VENIPUNCTURE: CPT | Performed by: NURSE PRACTITIONER

## 2018-08-06 PROCEDURE — 99214 OFFICE O/P EST MOD 30 MIN: CPT | Performed by: NURSE PRACTITIONER

## 2018-08-06 PROCEDURE — 80048 BASIC METABOLIC PNL TOTAL CA: CPT | Performed by: NURSE PRACTITIONER

## 2018-08-06 NOTE — LETTER
August 7, 2018      Jennifer Stinson  [address] [facility] [facility] [address]        Dear ,    We are writing to inform you of your test results.    Your test results fall within the expected range(s) or remain unchanged from previous results.  .    Resulted Orders   CBC with platelets   Result Value Ref Range    WBC 5.6 4.0 - 11.0 10e9/L    RBC Count 4.88 3.8 - 5.2 10e12/L    Hemoglobin 13.7 11.7 - 15.7 g/dL    Hematocrit 41.5 35.0 - 47.0 %    MCV 85 78 - 100 fl    MCH 28.1 26.5 - 33.0 pg    MCHC 33.0 31.5 - 36.5 g/dL    RDW 14.5 10.0 - 15.0 %    Platelet Count 285 150 - 450 10e9/L   Basic metabolic panel   Result Value Ref Range    Sodium 140 133 - 144 mmol/L    Potassium 3.9 3.4 - 5.3 mmol/L    Chloride 105 94 - 109 mmol/L    Carbon Dioxide 32 20 - 32 mmol/L    Anion Gap 3 3 - 14 mmol/L    Glucose 100 (H) 70 - 99 mg/dL      Comment:      Non Fasting    Urea Nitrogen 13 7 - 30 mg/dL    Creatinine 0.58 0.52 - 1.04 mg/dL    GFR Estimate >90 >60 mL/min/1.7m2      Comment:      Non  GFR Calc    GFR Estimate If Black >90 >60 mL/min/1.7m2      Comment:       GFR Calc    Calcium 8.3 (L) 8.5 - 10.1 mg/dL       If you have any questions or concerns, please call the clinic at the number listed above.       Sincerely,        Dinah Molina NP

## 2018-08-06 NOTE — MR AVS SNAPSHOT
After Visit Summary   8/6/2018    Jennifer Stinson    MRN: [id number]           Patient Information     Date Of Birth          1950        Visit Information        Provider Department      8/6/2018 12:30 PM Dinah Molina NP; [employer] [facility]        Today's Diagnoses     Preop general physical exam    -  1      Care Instructions      Before Your Surgery      Call your surgeon if there is any change in your health. This includes signs of a cold or flu (such as a sore throat, runny nose, cough, rash or fever).    Do not smoke, drink alcohol or take over the counter medicine (unless your surgeon or primary care doctor tells you to) for the 24 hours before and after surgery.    If you take prescribed drugs: Follow your doctor s orders about which medicines to take and which to stop until after surgery.    Eating and drinking prior to surgery: follow the instructions from your surgeon    Take a shower or bath the night before surgery. Use the soap your surgeon gave you to gently clean your skin. If you do not have soap from your surgeon, use your regular soap. Do not shave or scrub the surgery site.  Wear clean pajamas and have clean sheets on your bed.           Follow-ups after your visit        Who to contact     If you have questions or need follow up information about today's clinic visit or your schedule please contact [facility] directly at [phone].  Normal or non-critical lab and imaging results will be communicated to you by MyChart, letter or phone within 4 business days after the clinic has received the results. If you do not hear from us within 7 days, please contact the clinic through MyChart or phone. If you have a critical or abnormal lab result, we will notify you by phone as soon as possible.  Submit refill requests through [employer] or call your pharmacy and they will forward the refill request to us. Please allow 3  "business days for your refill to be completed.          Additional Information About Your Visit        Care EveryWhere ID     This is your Care EveryWhere ID. This could be used by other organizations to access your [place] medical records  [id number]        Your Vitals Were     Pulse Temperature Respirations Height Pulse Oximetry Breastfeeding?    87 98.1  F (36.7  C) (Oral) 14 5' 6\" (1.676 m) 98% No    BMI (Body Mass Index)                   32.44 kg/m2            Blood Pressure from Last 3 Encounters:   08/06/18 132/74   07/20/18 148/78   07/07/18 128/82    Weight from Last 3 Encounters:   08/06/18 201 lb (91.2 kg)   07/20/18 201 lb (91.2 kg)   06/27/18 199 lb 4.8 oz (90.4 kg)              We Performed the Following     Basic metabolic panel     CBC with platelets        Primary Care Provider Office Phone # Fax #    Lenchoi SEBAS MD [facility] [phone]       [address] [facility] [address]        Equal Access to Services     [facility]: Hadii aad ku hadasho Soomaali, waaxda luqadaha, qaybta kaalmada adeegyada, waxamaya sabillon . So [facility] [phone].    ATENCIÓN: Si habla español, tiene a andre disposición servicios gratuitos de asistencia lingüística. Llame al [phone].    We comply with applicable federal civil rights laws and [place] laws. We do not discriminate on the basis of race, color, national origin, age, disability, sex, sexual orientation, or gender identity.            Thank you!     Thank you for choosing [facility]  for your care. Our goal is always to provide you with excellent care. Hearing back from our patients is one way we can continue to improve our services. Please take a few minutes to complete the written survey that you may receive in the mail after your visit with us. Thank you!             Your Updated Medication List - Protect others around you: Learn how to safely use, store and throw away your medicines at " www.disposemymeds.org.          This list is accurate as of 8/6/18  1:23 PM.  Always use your most recent med list.                   Brand Name Dispense Instructions for use Diagnosis    amLODIPine 10 MG tablet    NORVASC    90 tablet    Take 1 tablet (10 mg) by mouth daily    Essential hypertension with goal blood pressure less than 140/90       aspirin 325 MG EC tablet     60 tablet    Take 1 tablet (325 mg) by mouth daily    Chest pain, unspecified type       atorvastatin 40 MG tablet    LIPITOR    30 tablet    Take 1 tablet (40 mg) by mouth daily    Chest pain, unspecified type       levothyroxine 88 MCG tablet    SYNTHROID/LEVOTHROID    90 tablet    Take 1 tablet (88 mcg) by mouth daily    Other specified hypothyroidism       lisinopril 40 MG tablet    PRINIVIL/ZESTRIL    30 tablet    Take 1 tablet (40 mg) by mouth daily    Essential hypertension       metoprolol succinate 50 MG 24 hr tablet    TOPROL-XL    30 tablet    Take 1 tablet (50 mg) by mouth daily    Essential hypertension       omeprazole 20 MG tablet     60 tablet    Take 1 tablet (20 mg) by mouth daily Take 30-60 minutes before a meal.    Gastroesophageal reflux disease without esophagitis

## 2018-08-06 NOTE — PROGRESS NOTES
[place] [address]  [facility] [address]  [phone]  Dept: [phone]    PRE-OP EVALUATION:  Today's date: 2018    Jennifer Stinson (: 1950) presents for pre-operative evaluation assessment as requested by Dr. Zepeda.  She requires evaluation and anesthesia risk assessment prior to undergoing surgery/procedure for treatment of removing titanium implant from sinus  .    Proposed Surgery/ Procedure: implant removal, sinus  Date of Surgery/ Procedure: 2018  Time of Surgery/ Procedure: 09  Hospital/Surgical Facility: [facility]    Primary Physician: Lucia Mcbride  Type of Anesthesia Anticipated: to be determined    Patient has a Health Care Directive or Living Will:  NO    1. YES - DO YOU HAVE A HISTORY OF HEART ATTACK, STROKE, STENT, BYPASS OR SURGERY ON AN ARTERY IN THE HEAD, NECK, HEART OR LEG? Stroke   2. NO - Do you ever have any pain or discomfort in your chest?  3. NO - Do you have a history of  Heart Failure?  4. NO - Are you troubled by shortness of breath when: walking on the level, up a slight hill or at night?  5. NO - Do you currently have a cold, bronchitis or other respiratory infection?  6. NO - Do you have a cough, shortness of breath or wheezing?  7. NO - Do you sometimes get pains in the calves of your legs when you walk?  8. NO - Do you or anyone in your family have previous history of blood clots?  9. NO - Do you or does anyone in your family have a serious bleeding problem such as prolonged bleeding following surgeries or cuts?  10. NO - Have you ever had problems with anemia or been told to take iron pills?  11. NO - Have you had any abnormal blood loss such as black, tarry or bloody stools, or abnormal vaginal bleeding?  12. NO - Have you ever had a blood transfusion?  13. NO - Have you or any of your relatives ever had problems with anesthesia?  14. NO - Do you have sleep apnea, excessive snoring or daytime  drowsiness?  15. NO - Do you have any prosthetic heart valves?  16. NO - Do you have prosthetic joints?  17. NO - Is there any chance that you may be pregnant?      HPI:     HPI related to upcoming procedure: titanium implant, sinus          MEDICAL HISTORY:     Patient Active Problem List    Diagnosis Date Noted     Cerebrovascular accident (CVA), unspecified mechanism (H) 06/13/2018     Priority: Medium     Stroke (H)      Priority: Medium     Abdominal pain 11/28/2016     Priority: Medium     Other specified hypothyroidism 02/10/2016     Priority: Medium     Essential hypertension 01/06/2016     Priority: Medium     Gastroesophageal reflux disease without esophagitis 01/06/2016     Priority: Medium     Hyperlipidemia with target LDL less than 130      Priority: Medium     Diagnosis updated by automated process. Provider to review and confirm.        Past Medical History:   Diagnosis Date     GERD (gastroesophageal reflux disease)      Hyperlipidemia LDL goal < 130      Hypertension      Hypothyroidism      Stroke (H)      Past Surgical History:   Procedure Laterality Date     CHOLECYSTECTOMY       Current Outpatient Prescriptions   Medication Sig Dispense Refill     amLODIPine (NORVASC) 10 MG tablet Take 1 tablet (10 mg) by mouth daily 90 tablet 1     aspirin  MG EC tablet Take 1 tablet (325 mg) by mouth daily 60 tablet 0     atorvastatin (LIPITOR) 40 MG tablet Take 1 tablet (40 mg) by mouth daily 30 tablet 0     levothyroxine (SYNTHROID/LEVOTHROID) 88 MCG tablet Take 1 tablet (88 mcg) by mouth daily 90 tablet 1     lisinopril (PRINIVIL,ZESTRIL) 40 MG tablet Take 1 tablet (40 mg) by mouth daily 30 tablet 0     metoprolol succinate (TOPROL-XL) 50 MG 24 hr tablet Take 1 tablet (50 mg) by mouth daily 30 tablet 1     omeprazole 20 MG tablet Take 1 tablet (20 mg) by mouth daily Take 30-60 minutes before a meal. 60 tablet 5     OTC products: None, except as noted above    Allergies   Allergen Reactions     No  "Clinical Screening - See Comments Swelling and Rash     Other reaction(s): Edema  All meat: poultry, beef, etc.  Reaction: swelling  Cinnamon and other spices  Reaction: face swelling, rash  Cinnamon and other spices  Reaction: face swelling, rash  All meat: poultry, beef, etc.  Reaction: swelling     Nuts Other (See Comments) and Rash     All nuts  Reaction: swelling, rash, headache     Peanut-Derived Rash     Other reaction(s): Headache  All nuts  Reaction: swelling, rash, headache      Latex Allergy: NO    Social History   Substance Use Topics     Smoking status: Never Smoker     Smokeless tobacco: Never Used     Alcohol use No     History   Drug Use No       REVIEW OF SYSTEMS:   CONSTITUTIONAL: NEGATIVE for fever, chills, change in weight  ENT/MOUTH: NEGATIVE for ear, mouth and throat problems  RESP: NEGATIVE for significant cough or SOB  CV: NEGATIVE for chest pain, palpitations or peripheral edema  GI: NEGATIVE for nausea, abdominal pain, heartburn, or change in bowel habits  : NEGATIVE for frequency, dysuria, or hematuria  MUSCULOSKELETAL: NEGATIVE for significant arthralgias or myalgia  NEURO: NEGATIVE for weakness, dizziness or paresthesias  ENDOCRINE: NEGATIVE for temperature intolerance, skin/hair changes  HEME: NEGATIVE for bleeding problems  PSYCHIATRIC: NEGATIVE for changes in mood or affect    EXAM:   /74 (BP Location: Right arm, Patient Position: Sitting, Cuff Size: Adult Large)  Pulse 87  Temp 98.1  F (36.7  C) (Oral)  Resp 14  Ht 5' 6\" (1.676 m)  Wt 201 lb (91.2 kg)  SpO2 98%  Breastfeeding? No  BMI 32.44 kg/m2    GENERAL APPEARANCE: healthy, alert and no distress     NECK: no adenopathy, no asymmetry, masses, or scars and thyroid normal to palpation     RESP: lungs clear to auscultation - no rales, rhonchi or wheezes     CV: regular rates and rhythm, normal S1 S2, no S3 or S4 and no murmur, click or rub     ABDOMEN:  soft, nontender, no HSM or masses and bowel sounds normal     MS: " extremities normal- no gross deformities noted, no evidence of inflammation in joints, FROM in all extremities.     SKIN: no suspicious lesions or rashes     NEURO: Normal strength and tone, sensory exam grossly normal, mentation intact and speech normal     PSYCH: mentation appears normal. and affect normal/bright    DIAGNOSTICS:   Normal echocardiogram 7/6/18    Recent Labs   Lab Test  06/13/18   1202  07/01/17   0514   11/28/16   1722   11/25/11   1904  11/25/11   0045   HGB  13.3  14.4   --   13.0   < >  12.4  13.2   PLT   --   286   --   301   < >  224  258   INR   --    --    --    --    --    --   0.96   NA  143  137   --    --    < >  139  141   POTASSIUM  4.1  3.7   < >   --    < >  3.2*  3.5   CR  0.53  0.57   --    --    < >  0.52  0.59   A1C   --    --    --    --    --   5.5   --     < > = values in this interval not displayed.        IMPRESSION:   Reason for surgery/procedure: titanium implant removal, sinus    The proposed surgical procedure is considered LOW risk.    REVISED CARDIAC RISK INDEX  The patient has the following serious cardiovascular risks for perioperative complications such as (MI, PE, VFib and 3  AV Block):  Cerebrovascular Disease (TIA or CVA)  INTERPRETATION: 1 risks: Class II (low risk - 0.9% complication rate)    The patient has the following additional risks for perioperative complications:  No identified additional risks      ICD-10-CM    1. Preop general physical exam Z01.818      (Z01.818) Preop general physical exam  (primary encounter diagnosis)  Comment:   Plan: CBC with platelets, Basic metabolic panel              RECOMMENDATIONS:     --Consult hospital rounder / IM to assist post-op medical management    --Patient is to take all scheduled medications on the day of surgery EXCEPT for modifications listed below.    APPROVAL GIVEN to proceed with proposed procedure, without further diagnostic evaluation       Signed Electronically by: Dinah Molina NP    Copy of this  evaluation report is provided to requesting physician.    [place] Preop Guidelines    Revised Cardiac Risk Index

## 2018-08-07 LAB
ANION GAP SERPL CALCULATED.3IONS-SCNC: 3 MMOL/L (ref 3–14)
BUN SERPL-MCNC: 13 MG/DL (ref 7–30)
CALCIUM SERPL-MCNC: 8.3 MG/DL (ref 8.5–10.1)
CHLORIDE SERPL-SCNC: 105 MMOL/L (ref 94–109)
CO2 SERPL-SCNC: 32 MMOL/L (ref 20–32)
CREAT SERPL-MCNC: 0.58 MG/DL (ref 0.52–1.04)
GFR SERPL CREATININE-BSD FRML MDRD: >90 ML/MIN/1.7M2
GLUCOSE SERPL-MCNC: 100 MG/DL (ref 70–99)
POTASSIUM SERPL-SCNC: 3.9 MMOL/L (ref 3.4–5.3)
SODIUM SERPL-SCNC: 140 MMOL/L (ref 133–144)

## 2018-08-09 ENCOUNTER — HOSPITAL PATHOLOGY (OUTPATIENT)
Dept: OTHER | Facility: CLINIC | Age: 68
End: 2018-08-09

## 2018-08-13 LAB — COPATH REPORT: NORMAL

## 2018-08-16 ENCOUNTER — TRANSFERRED RECORDS (OUTPATIENT)
Dept: HEALTH INFORMATION MANAGEMENT | Facility: CLINIC | Age: 68
End: 2018-08-16

## 2018-08-30 ENCOUNTER — TRANSFERRED RECORDS (OUTPATIENT)
Dept: HEALTH INFORMATION MANAGEMENT | Facility: CLINIC | Age: 68
End: 2018-08-30

## 2018-09-02 DIAGNOSIS — I10 ESSENTIAL HYPERTENSION: ICD-10-CM

## 2018-09-04 NOTE — TELEPHONE ENCOUNTER
"Requested Prescriptions   Pending Prescriptions Disp Refills     metoprolol succinate (TOPROL-XL) 50 MG 24 hr tablet [Pharmacy Med Name: METOPROLOL ER SUCCINATE 50MG TABS]  Last Written Prescription Date:  6/13/2018  Last Fill Quantity: 30 tablet,  # refills: 1   Last Office Visit: 8/6/2018   Future Office Visit:    30 tablet 0     Sig: TAKE 1 TABLET(50 MG) BY MOUTH DAILY    Beta-Blockers Protocol Passed    9/2/2018 11:35 AM       Passed - Blood pressure under 140/90 in past 12 months    BP Readings from Last 3 Encounters:   08/06/18 132/74   07/20/18 148/78   07/07/18 128/82                Passed - Patient is age 6 or older       Passed - Recent (12 mo) or future (30 days) visit within the authorizing provider's specialty    Patient had office visit in the last 12 months or has a visit in the next 30 days with authorizing provider or within the authorizing provider's specialty.  See \"Patient Info\" tab in inbasket, or \"Choose Columns\" in Meds & Orders section of the refill encounter.              "

## 2018-09-06 RX ORDER — METOPROLOL SUCCINATE 50 MG/1
TABLET, EXTENDED RELEASE ORAL
Qty: 90 TABLET | Refills: 1 | Status: SHIPPED | OUTPATIENT
Start: 2018-09-06 | End: 2020-08-18

## 2018-09-07 ENCOUNTER — TRANSFERRED RECORDS (OUTPATIENT)
Dept: HEALTH INFORMATION MANAGEMENT | Facility: CLINIC | Age: 68
End: 2018-09-07

## 2018-09-08 ENCOUNTER — TRANSFERRED RECORDS (OUTPATIENT)
Dept: HEALTH INFORMATION MANAGEMENT | Facility: CLINIC | Age: 68
End: 2018-09-08

## 2018-09-12 ENCOUNTER — PATIENT OUTREACH (OUTPATIENT)
Dept: GERIATRIC MEDICINE | Facility: CLINIC | Age: 68
End: 2018-09-12

## 2018-09-12 NOTE — LETTER
September 12, 2018    JACK[place]N IR[place]  [address] [address]    Dear Jackelyn,     As a member of [place] [employer] ([facility] you are assigned a case coordinator.  I will be your new case coordinator as of 9/1/18 because:    [] Your address has changed.  [x] You have selected a new primary care clinic/physician.  [] Your previous case coordinator has left the agency.   [] You have been reassigned.    If you have any questions, please feel free to call me at [phone]. If you reach my voice mail, please leave a message and your phone number. If you are hearing impaired, please call the [place] Relay at 643 or [phone] (gwdjgg-ax-zgmzwa relay service).     I look forward to speaking with you soon.    Sincerely,      Nhi Aguillon \[facility]\""    [employer]  [email or web address]    [facility] is a health plan that contracts with both Medicare and the [place] Medical [employer] (Medicaid) program to provide benefits of both programs to enrollees. Enrollment in [facility] depends on contract renewal.    [facility]  [id number] DHS Approved ([id number])          (01/17)

## 2018-09-12 NOTE — PROGRESS NOTES
[facility] Care Coordination Contact  Member on [facility] enrollment.  CC change letter sent. Nayla Galvan  Case Management Specialist  [facility]  [phone]

## 2018-09-18 ENCOUNTER — PATIENT OUTREACH (OUTPATIENT)
Dept: GERIATRIC MEDICINE | Facility: CLINIC | Age: 68
End: 2018-09-18

## 2018-09-18 NOTE — PROGRESS NOTES
[facility] Care Coordination Contact  New member with [facility] effecitve 9/1/18 with [employer]+.  Previous CM reported unable to contact so no visit was done.   Pc to member using phone . She said she doesn't need any services and doesn't need a home visit. She can do her own cares. She did report that she see PCP at [facility] and PCP at [facility] She said it works for her to go to both. Suggested she pick one to get the best service and monitoring. She did not appear to be interested in continuing our conversation.  [facility] Refusal Telephone Assessment  Member refused home visit HRA on 9/18/18 (reason: independent and not interested in home visit).    ER visits: No  Hospitalizations: No  Health concerns: none discussed  Falls/Injuries: No  ADL/IADL Dependencies: None   Member currently receiving the following services: None  Reports that she still works and is not in need of any support or services.  Informal support(s): not discussed.    Follow-Up Plan: Member informed of future contact, plan to f/u with member with a 6 month telephone assessment and offer a home visit.  Contact information shared with member and family, encouraged member to call with any questions or concerns at any time.    Requested CMS to mail refusal letter on 10/3/18.    MATT Nieves  [facility]   [phone] (fax)  [email or web address]

## 2018-09-18 NOTE — LETTER
[address], [address]  [place] [address]  Phone:  [phone]  Fax:  [phone]        September 18, 2018    JACKELYN IRBY  [address] [place] [address]    Dear Jackelyn,    My name is MATT Nieves and I am your [facility] Care Coordinator.  You have indicated that you are not interested in meeting with me in person to complete a Health Risk Assessment.     As your care coordinator, I work in collaboration with your primary physician and clinic.  We also want you to be aware of the health plan initiatives that are available to you, free of charge.     Currently, the health plan is supporting the pneumonia vaccine and prescription ordered calcium and vitamin D supplements.  Please call me for more details, my number is listed below.    [facility] encourages all members to contact their care coordinator if they have had any change in condition, a recent emergency room visit or hospital stay.    Thank you,      MATT Nieves  Care Coordinator  [facility]  [phone]

## 2018-10-03 ENCOUNTER — PATIENT OUTREACH (OUTPATIENT)
Dept: GERIATRIC MEDICINE | Facility: CLINIC | Age: 68
End: 2018-10-03

## 2018-10-03 NOTE — PROGRESS NOTES
"[facility] Care Coordination Contact    I am the [facility] care coordinator for Jennifer Stinson. I am writing to let you know she reported to me during a phone conversation that she sees you for primary care as well as a provider at the [facility]. I encouraged her to choose one primary care provider for continuity of care and she said \"going to both is better\". I wanted to make you aware in case you want to discuss it with her at her next visit.      All of my documentation can be found in EPIC. Please do not hesitate to contact me with any questions or concerns.    MATT Nieves  [facility]   [phone] (fax)  [email or web address]  "

## 2018-10-04 NOTE — PROGRESS NOTES
"Per CC, mailed client a \"Refusal of Home Visit\" letter.  MMIS entry.    Nayla Galvan  Case Management Specialist  [facility]  [phone]      "

## 2019-03-28 ENCOUNTER — PATIENT OUTREACH (OUTPATIENT)
Dept: GERIATRIC MEDICINE | Facility: CLINIC | Age: 69
End: 2019-03-28

## 2019-03-28 NOTE — PROGRESS NOTES
[facility] Six-Month Telephone Assessment    6 month telephone assessment completed on 3/28/19 using a phone .  Reports she still sees primary care at [facility] and [facility] and plans to be seen at both.    ER visits: No  Hospitalizations: No  TCU stays: No  Significant health status changes: no  Falls/Injuries: No  ADL/IADL changes: No  Changes in services: No    Caregiver Assessment follow up:  N/a     Goals: See POC in chart for goal progress documentation.    No formal assessment so no formal goals.    Will see member in 6 months for an annual health risk assessment.   Encouraged member to call CC with any questions or concerns in the meantime.     MATT Nieves  [facility]   [phone] (fax)  [email or web address]

## 2019-09-05 ENCOUNTER — PATIENT OUTREACH (OUTPATIENT)
Dept: GERIATRIC MEDICINE | Facility: CLINIC | Age: 69
End: 2019-09-05

## 2019-09-05 NOTE — PROGRESS NOTES
Called member to schedule annual HRA home visit. Left a message on home and cell phone requesting a return call to schedule HRA.    MATT Nieves  [facility]   [phone] (fax)  [email or web address]

## 2019-09-12 ENCOUNTER — PATIENT OUTREACH (OUTPATIENT)
Dept: GERIATRIC MEDICINE | Facility: CLINIC | Age: 69
End: 2019-09-12

## 2019-09-12 ASSESSMENT — ACTIVITIES OF DAILY LIVING (ADL): DEPENDENT_IADLS:: INDEPENDENT

## 2019-10-10 ENCOUNTER — PATIENT OUTREACH (OUTPATIENT)
Dept: GERIATRIC MEDICINE | Facility: CLINIC | Age: 69
End: 2019-10-10

## 2019-10-10 NOTE — PROGRESS NOTES
[facility] Care Coordination Contact    [facility] Initial Assessment     Home visit for Initial Health Risk Assessment with Jennifer JAMAAL Narvaezkevin completed on September 12, 2019    Type of residence:: Apartment  Current living arrangement:: I live in a private home with family     Assessment completed with:: Patient, Care Team Member, Family    Current Care Plan  Member currently receiving the following home care services:     Member currently receiving the following community resources: None    Medication Review  Medication reconciliation completed in Epic: Yes  Medication set-up & administration: Independent-does not set up.  Self-administers medications.  Medication Risk Assessment Medication (1 or more, place referral to MTM): N/A: No risk factors identified  MTM Referral Placed: No: No risk factors idenified    Mental/Behavioral Health   Depression Screening: See PHQ assessment flowsheet.          No current MH services-will place referral for none    Falls Assessment:   Fallen 2 or more times in the past year?: No   Any fall with injury in the past year?: No    ADL/IADL Dependencies:   Dependent ADLs:: Independent  Dependent IADLs:: Independent    [facility] Health Plan sponsored benefits: Shared information re: Silver Sneakers/gym memberships, ASA, Calcium +D.    PCA Assessment completed at visit: No     Elderly Waiver Eligibility: No-does not meet criteria    Care Plan & Recommendations: Member is independent with all ADL's and IADL's.  Has informal support from family as needed.  Member will continue to live independently in the community with no formal services.  No services needed or requested at this time.    See CC for detailed assessment information.    Follow-Up Plan: Member informed of future contact, plan to f/u with member with a 6 month telephone assessment.  Contact information shared with member and family, encouraged member to call with any questions or concerns at any time.    [place]  care continuum providers: Please refer to Health Care Home on the Epic Problem List to view this patient's [facility] Care Plan Summary.    MATT Nieves  [facility]   [phone] (fax)  [email or web address]

## 2019-10-10 NOTE — LETTER
October 10, 2019    JACKELYN IRBY  [address]   [place] [address]        Dear Jackelyn:    At [facility], we are dedicated to improving your health and well-being. Enclosed is the Comprehensive Care Plan that we developed with you on 9/12/2019. Please review the Care Plan carefully.    As a reminder, some of the things we discussed at your visit include:    Your physical and mental health    Ways to reduce falls    Health care needs you may have    Don t forget to contact your care coordinator if you:    Have been hospitalized or plan to be hospitalized     Have had a fall     Have experienced a change in physical health    Are experiencing emotional problems     If you do not agree with your Care Plan, have questions about it, or have experienced a change in your needs, please call me at [phone]. If you are hearing impaired, please call the [place] Relay at 265 or [phone] (gnskdx-th-amrtni relay service).    Sincerely,        MATT Nieves    E-mail: [email or web address]  Phone: [phone]      [facility]+[id number] [place] ([id number]     [id number] (11/18)

## 2019-10-10 NOTE — PROGRESS NOTES
[facility] Care Coordination Contact    Received after visit chart from care coordinator.  Completed following tasks: Mailed copy of care plan to client  Chart was returned to CC.    and Provider Signature - No POC Shared:  Member indicates that they do not want their POC shared with any EW providers.     Winifred Gaspar  Care Management Specialist  [facility]  [phone]

## 2019-10-10 NOTE — PROGRESS NOTES
Member changed care system to [facility]. Disenrolled effective 9/30/19.    MATT Nieves  [facility]   [phone] (fax)  [email or web address]

## 2019-10-22 ENCOUNTER — PATIENT OUTREACH (OUTPATIENT)
Dept: GERIATRIC MEDICINE | Facility: CLINIC | Age: 69
End: 2019-10-22

## 2019-10-22 NOTE — PROGRESS NOTES
10/10/19  Notified that member's MA is not active and we cannot transfer until active again.    L/M for Mira, financial worker. [phone].  Asked if MA paperwork has been turned itn.    Nhi Aguillon, [facility]   [phone] (fax)  [email or web address]

## 2019-10-22 NOTE — PROGRESS NOTES
10/21/19  VM from Mira at [facility] on 10/17/19. She said she cannot share information with me because I am not listed as an authorized rep.  She will review the case and see what is needed to make it active again. [phone].     PC to Lisa, daughter. Asked if anyone has turned in MA paperwork to the [facility]. She said no. Gave her the financial worker's name and number and case number. Asked that she take her mom to the [facility] directly to fill out the forms needed so that her MA can be active again. Once MA is active her case will transfer back to [facility] b/c of the primary clinic change.     MATT Nieves  [facility]   [phone] (fax)  [email or web address]

## 2019-12-31 NOTE — PROGRESS NOTES
10/22/19  Spoke to Lisa re: GWENDOLYN jack. She said she didn't receive the paperwork. directed her to bring her mom to the [facility] to fill out the renewal paperwork so she can be active again with MA.    10/23/19  ANTONIO/YAAKOV for Mira, financial worker. Let her know I am able to get info on the case even though not listed as an authorized rep. let her know I directed the family to bring her to the [facility] to fill out the paperwork because they are reporting they didn't receive it.  Asked for an update on the status.    10/25/19  VM from Mira at [facility]. Paperwork was mailed out on the 17th. She should have it by now.  Pmi [id number]    MATT Nieves  [facility]   [phone] (fax)  [email or web address]

## 2019-12-31 NOTE — PROGRESS NOTES
12/16/19  ANTONIO/YAAKOV for Mira, financial worker. Asked if MA paperwork has been turned in.    12/17/19  VM from Mira. They were still waiting on paperwork. Now transferred to [facility]. [id number] case number. [phone]-[facility], or [phone].    MATT Nieves  [facility]   [phone] (fax)  [email or web address]

## 2020-03-06 PROBLEM — Z76.89 HEALTH CARE HOME: Status: ACTIVE | Noted: 2018-09-12

## 2020-03-30 PROBLEM — Z76.89 HEALTH CARE HOME: Status: RESOLVED | Noted: 2018-09-12 | Resolved: 2020-03-30

## 2020-04-15 ENCOUNTER — PATIENT OUTREACH (OUTPATIENT)
Dept: GERIATRIC MEDICINE | Facility: CLINIC | Age: 70
End: 2020-04-15

## 2020-04-15 NOTE — LETTER
April 15, 2020    JACKELYN IRBY  [address]   [place] [address]    Dear Jackelyn:     Your Care Coordinator has been unable to reach you by telephone. I am writing to ask you or a family member to call me at [phone]. If you reach my voicemail, please leave a message with your daytime telephone number and a date and time that I can call you. If you are hearing impaired, please call the [place] Rel[employer] at [phone] or [phone] (zynjpc-iv-xeeqtl relay service).     The reason I am trying to reach you is:     [] Six (6) month check-in  [x] To schedule your annual assessment  [] Other:      Please call me as soon as you receive this letter. I look forward to speaking with you.    Sincerely,    MATT Nieves    E-mail: [email or web address]  Phone: [phone]      [facility]+ [id number] [place] ([id number])    [id number] (11/18)

## 2020-04-15 NOTE — PROGRESS NOTES
[facility] Care Coordination Contact    Member became effective with [facility] on 4/1/2020 with [employer]+.  Previous Health Plan: MA/Fee For Service  Previous Care System: [place] [facility]  Previous care coordinators name and number: Nhi AguillonMATT [phone]  Waiver Type: N/A  Last MMIS Entry: Date 9/12/19 and Type Pers Assmt  MMIS visit date if different from above: NA  Services Listed in MMIS:   Member currently receiving the following home care services:     Member currently receiving the following community resources:    UTF received: Yes: Received and saved to member file. No UTF to request as Nhi Aguillon was the last CC in MMIS.    Winifred Gaspar  Care Management Specialist  [facility]  [phone]

## 2020-04-15 NOTE — LETTER
April 15, 2020    JACKELYN IRBY  [address]   [place] [address]        Dear  Jackelyn,    Welcome to [facility] [place] [facility] ([facility] health program. My name is MATT Nieves. I am your MSC+ care coordinator.     I will call you soon to see how you are doing and determine what needs you may have. My job is to help connect you to services, complete an assessment, and develop a care plan with you. There is no charge to you for the care coordination and assessment services. Our goal is to keep you as healthy and independent as possible.     [facility] includes the benefits you may receive from Medical Assistance.    Soon, you will receive a new MSC+ member identification (ID) card from [facility]. When you receive it, please use this card along with your [place] [facility] card and Prescription Drug Coverage Program card. When you receive, it please use this card where you get your health services. If you have Medicare, you will need to show your Medicare card when you get health services.    If you have questions, please call me at [phone]. If you reach my voice mail, leave a message and your phone number. If you are hearing impaired, please call the [place] Relay at 860 or [phone] (flvjya-ly-urxggh relay service).    Sincerely,      MATT Nieves    E-mail: [email or web address]  Phone: [phone]      [facility] [id number] DHS Approved ([id number])  [id number] (11/18)

## 2020-04-27 NOTE — PROGRESS NOTES
4/27/2020  L/M for Jennifer at [phone] with phone . Asked for a call back.  Phone number [phone] not accepting calls.    MATT Nieves  [employer]   [phone] (fax)  [email or web address]

## 2020-04-28 NOTE — PROGRESS NOTES
4/28/2020  [facility] Care Coordination Contact    [facility] Care System Change (Transfer)    Member is new enrollee to [facility] effective 4/1/2020 with [facility]+ health plan. Member transferred from [facility].    No home visit required because this care coordinator (CC) has received all required documentation from the previous CC.  This CC was the previous CC    PC to member, spoke to daughter Chastity. Re-introduced self as member's CC. Confirmed with member that the welcome letter with writer's name and contact information has been received.  Member will be transferring back to [facility] effective 5/1/20 because of PCP clinic change.  Reviewed LTCC/Health Risk Assessment (HRA) and POC with member. No changes noted.  Transitional HRA completed. Care Plan Summary updated and reflects current services.  Required referral authorization information communicated to CMS: Not applicable    MMIS entry completed by: Care Coordinator    Writer reviewed the following with member:  [facility] Six-Month Telephone Assessment    ER visits: No  Hospitalizations: No  TCU stays: No  Significant health status changes: no change reported.  Falls/Injuries: No  ADL/IADL changes: No  Changes in services: No  Caregiver Assessment follow up:  N/a    Goals: See POC in chart for goal progress documentation.    1. Continue goal to live safely in the community.    Will see member in 6 months for an annual health risk assessment.   Encouraged member to call CC with any questions or concerns in the meantime.     Follow-Up Plan: Member informed of future contact, plan to f/u with member with at next regularly scheduled contact.  Contact information shared with member and family, encouraged member to call with any questions or concerns.    MATT Nieves  [facility]   [phone] (fax)  [email or web address]

## 2020-05-14 ENCOUNTER — PATIENT OUTREACH (OUTPATIENT)
Dept: GERIATRIC MEDICINE | Facility: CLINIC | Age: 70
End: 2020-05-14

## 2020-05-14 NOTE — PROGRESS NOTES
Disenrolled effective 5/1/20 due to change in primary care clinic.  Completed [facility] paperwork.    MATT Nieves  [employer]   [phone] (fax)  [email or web address]

## 2020-08-17 ENCOUNTER — TRANSFERRED RECORDS (OUTPATIENT)
Dept: HEALTH INFORMATION MANAGEMENT | Facility: CLINIC | Age: 70
End: 2020-08-17

## 2020-08-18 ENCOUNTER — OFFICE VISIT (OUTPATIENT)
Dept: INTERNAL MEDICINE | Facility: CLINIC | Age: 70
End: 2020-08-18
Payer: MEDICARE

## 2020-08-18 VITALS
HEART RATE: 71 BPM | BODY MASS INDEX: 34.07 KG/M2 | OXYGEN SATURATION: 98 % | WEIGHT: 212 LBS | RESPIRATION RATE: 16 BRPM | HEIGHT: 66 IN

## 2020-08-18 DIAGNOSIS — K21.9 GASTROESOPHAGEAL REFLUX DISEASE WITHOUT ESOPHAGITIS: ICD-10-CM

## 2020-08-18 DIAGNOSIS — Z12.11 SPECIAL SCREENING FOR MALIGNANT NEOPLASMS, COLON: ICD-10-CM

## 2020-08-18 DIAGNOSIS — I10 ESSENTIAL HYPERTENSION WITH GOAL BLOOD PRESSURE LESS THAN 140/90: ICD-10-CM

## 2020-08-18 DIAGNOSIS — Z12.31 ENCOUNTER FOR SCREENING MAMMOGRAM FOR BREAST CANCER: ICD-10-CM

## 2020-08-18 DIAGNOSIS — E03.8 OTHER SPECIFIED HYPOTHYROIDISM: ICD-10-CM

## 2020-08-18 DIAGNOSIS — I10 ESSENTIAL HYPERTENSION: ICD-10-CM

## 2020-08-18 DIAGNOSIS — R07.9 CHEST PAIN, UNSPECIFIED TYPE: ICD-10-CM

## 2020-08-18 DIAGNOSIS — Z00.00 HEALTHCARE MAINTENANCE: Primary | ICD-10-CM

## 2020-08-18 LAB
ERYTHROCYTE [DISTWIDTH] IN BLOOD BY AUTOMATED COUNT: 14.1 % (ref 10–15)
HCT VFR BLD AUTO: 43.2 % (ref 35–47)
HGB BLD-MCNC: 14 G/DL (ref 11.7–15.7)
MCH RBC QN AUTO: 27.6 PG (ref 26.5–33)
MCHC RBC AUTO-ENTMCNC: 32.4 G/DL (ref 31.5–36.5)
MCV RBC AUTO: 85 FL (ref 78–100)
PLATELET # BLD AUTO: 294 10E9/L (ref 150–450)
RBC # BLD AUTO: 5.07 10E12/L (ref 3.8–5.2)
WBC # BLD AUTO: 7.3 10E9/L (ref 4–11)

## 2020-08-18 PROCEDURE — 99213 OFFICE O/P EST LOW 20 MIN: CPT | Mod: 25 | Performed by: NURSE PRACTITIONER

## 2020-08-18 PROCEDURE — 85027 COMPLETE CBC AUTOMATED: CPT | Performed by: NURSE PRACTITIONER

## 2020-08-18 PROCEDURE — 90471 IMMUNIZATION ADMIN: CPT | Performed by: NURSE PRACTITIONER

## 2020-08-18 PROCEDURE — 99397 PER PM REEVAL EST PAT 65+ YR: CPT | Mod: 25 | Performed by: NURSE PRACTITIONER

## 2020-08-18 PROCEDURE — 84460 ALANINE AMINO (ALT) (SGPT): CPT | Performed by: NURSE PRACTITIONER

## 2020-08-18 PROCEDURE — 80061 LIPID PANEL: CPT | Performed by: NURSE PRACTITIONER

## 2020-08-18 PROCEDURE — 36415 COLL VENOUS BLD VENIPUNCTURE: CPT | Performed by: NURSE PRACTITIONER

## 2020-08-18 PROCEDURE — 84443 ASSAY THYROID STIM HORMONE: CPT | Performed by: NURSE PRACTITIONER

## 2020-08-18 PROCEDURE — 80048 BASIC METABOLIC PNL TOTAL CA: CPT | Performed by: NURSE PRACTITIONER

## 2020-08-18 PROCEDURE — 90715 TDAP VACCINE 7 YRS/> IM: CPT | Performed by: NURSE PRACTITIONER

## 2020-08-18 RX ORDER — METOPROLOL SUCCINATE 50 MG/1
50 TABLET, EXTENDED RELEASE ORAL DAILY
Qty: 90 TABLET | Refills: 1 | Status: SHIPPED | OUTPATIENT
Start: 2020-08-18 | End: 2021-02-25

## 2020-08-18 RX ORDER — LISINOPRIL 40 MG/1
40 TABLET ORAL DAILY
Qty: 30 TABLET | Refills: 0 | Status: SHIPPED | OUTPATIENT
Start: 2020-08-18 | End: 2021-09-16

## 2020-08-18 RX ORDER — AMLODIPINE BESYLATE 10 MG/1
10 TABLET ORAL DAILY
Qty: 90 TABLET | Refills: 1 | Status: SHIPPED | OUTPATIENT
Start: 2020-08-18 | End: 2021-02-25

## 2020-08-18 RX ORDER — ATORVASTATIN CALCIUM 40 MG/1
40 TABLET, FILM COATED ORAL DAILY
Qty: 30 TABLET | Refills: 0 | Status: SHIPPED | OUTPATIENT
Start: 2020-08-18 | End: 2021-01-05

## 2020-08-18 RX ORDER — LEVOTHYROXINE SODIUM 88 UG/1
88 TABLET ORAL DAILY
Qty: 90 TABLET | Refills: 1 | Status: SHIPPED | OUTPATIENT
Start: 2020-08-18 | End: 2021-02-25

## 2020-08-18 RX ORDER — NICOTINE POLACRILEX 4 MG/1
20 GUM, CHEWING ORAL DAILY
Qty: 60 TABLET | Refills: 5 | Status: SHIPPED | OUTPATIENT
Start: 2020-08-18 | End: 2021-08-25

## 2020-08-18 RX ORDER — MULTIVITAMIN
1 TABLET ORAL DAILY
COMMUNITY
End: 2024-03-12

## 2020-08-18 ASSESSMENT — MIFFLIN-ST. JEOR: SCORE: 1508.38

## 2020-08-18 ASSESSMENT — ACTIVITIES OF DAILY LIVING (ADL): CURRENT_FUNCTION: NO ASSISTANCE NEEDED

## 2020-08-18 NOTE — PROGRESS NOTES
"SUBJECTIVE:   Jennifer Stinson is a 70 year old female who presents for Preventive Visit.      Are you in the first 12 months of your Medicare coverage?  No    Healthy Habits:     In general, how would you rate your overall health?  Fair    Frequency of exercise:  None    Duration of exercise:  Other    Do you usually eat at least 4 servings of fruit and vegetables a day, include whole grains    & fiber and avoid regularly eating high fat or \"junk\" foods?  No    Taking medications regularly:  Yes    Barriers to taking medications:  None    Medication side effects:  None    Ability to successfully perform activities of daily living:  No assistance needed    Home Safety:  No safety concerns identified    Hearing Impairment:  No hearing concerns    In the past 6 months, have you been bothered by leaking of urine?  No    In general, how would you rate your overall mental or emotional health?  Good      PHQ-2 Total Score: 0    Additional concerns today:  No    Do you feel safe in your environment? Yes    Have you ever done Advance Care Planning? (For example, a Health Directive, POLST, or a discussion with a medical provider or your loved ones about your wishes): No, advance care planning information given to patient to review.  Advanced care planning was discussed at today's visit.      Fall risk       Fallen two or more times in the last year   No   Any fall with an injury in the last year  No    Cognitive Screening   1) Repeat 3 items (Leader, Season, Table)    2) Clock draw: NORMAL  3) 3 item recall: Recalls 2 objects   Results: NORMAL clock, 1-2 items recalled: COGNITIVE IMPAIRMENT LESS LIKELY    Mini-CogTM Copyright NEVIN Hyatt. Licensed by the author for use in [facility]; reprinted with permission (delonte@.[facility]). All rights reserved.      Do you have sleep apnea, excessive snoring or daytime drowsiness?: sometimes    Reviewed and updated as needed this visit by clinical staff  Allergies         Reviewed " and updated as needed this visit by Provider        Social History     Tobacco Use     Smoking status: Never Smoker     Smokeless tobacco: Never Used   Substance Use Topics     Alcohol use: No         Alcohol Use 6/14/2016   Prescreen: >3 drinks/day or >7 drinks/week? The patient does not drink >3 drinks per day nor >7 drinks per week.           Hyperlipidemia Follow-Up      Are you regularly taking any medication or supplement to lower your cholesterol?   Yes- lipitor    Are you having muscle aches or other side effects that you think could be caused by your cholesterol lowering medication?  No    Hypertension Follow-up      Do you check your blood pressure regularly outside of the clinic? Yes     Are you following a low salt diet? Yes    Are your blood pressures ever more than 140 on the top number (systolic) OR more   than 90 on the bottom number (diastolic), for example 140/90? No      Current providers sharing in care for this patient include:   Patient Care Team:  Lucia Mcbride MD as PCP - General (Internal Medicine)  Lucia Mcbride MD as Assigned PCP    The following health maintenance items are reviewed in Epic and correct as of today:  Health Maintenance   Topic Date Due     DEXA  1950     HEPATITIS C SCREENING  1950     MAMMO SCREENING  1950     COLORECTAL CANCER SCREENING  01/01/1962     DTAP/TDAP/TD IMMUNIZATION (1 - Tdap) 01/01/1977     ZOSTER IMMUNIZATION (1 of 2) 01/01/2002     PNEUMOCOCCAL IMMUNIZATION 65+ LOW/MEDIUM RISK (1 of 2 - PCV13) 01/01/2017     MEDICARE ANNUAL WELLNESS VISIT  06/14/2017     PHQ-2  01/01/2020     FALL RISK ASSESSMENT  09/12/2020     INFLUENZA VACCINE (1) 09/01/2020     ADVANCE CARE PLANNING  12/09/2020     LIPID  11/29/2021     IPV IMMUNIZATION  Aged Out     MENINGITIS IMMUNIZATION  Aged Out     HEPATITIS B IMMUNIZATION  Aged Out     BP Readings from Last 3 Encounters:   08/06/18 132/74   07/20/18 148/78   07/07/18 128/82    Wt  Readings from Last 3 Encounters:   08/18/20 96.2 kg (212 lb)   08/06/18 91.2 kg (201 lb)   07/20/18 91.2 kg (201 lb)                  Patient Active Problem List   Diagnosis     Hyperlipidemia with target LDL less than 130     Essential hypertension     Gastroesophageal reflux disease without esophagitis     Other specified hypothyroidism     Abdominal pain     Stroke (H)     Cerebrovascular accident (CVA), unspecified mechanism (H)     Past Surgical History:   Procedure Laterality Date     CHOLECYSTECTOMY         Social History     Tobacco Use     Smoking status: Never Smoker     Smokeless tobacco: Never Used   Substance Use Topics     Alcohol use: No     Family History   Problem Relation Age of Onset     Family History Negative Mother      Family History Negative Father      Family History Negative Maternal Grandmother      Family History Negative Maternal Grandfather      Family History Negative Paternal Grandmother      Family History Negative Paternal Grandfather      Diabetes No family hx of      Thyroid Disease No family hx of      Cancer No family hx of          Current Outpatient Medications   Medication Sig Dispense Refill     amLODIPine (NORVASC) 10 MG tablet Take 1 tablet (10 mg) by mouth daily 90 tablet 1     aspirin  MG EC tablet Take 1 tablet (325 mg) by mouth daily 60 tablet 0     atorvastatin (LIPITOR) 40 MG tablet Take 1 tablet (40 mg) by mouth daily 30 tablet 0     levothyroxine (SYNTHROID/LEVOTHROID) 88 MCG tablet Take 1 tablet (88 mcg) by mouth daily 90 tablet 1     lisinopril (ZESTRIL) 40 MG tablet Take 1 tablet (40 mg) by mouth daily 30 tablet 0     metoprolol succinate ER (TOPROL-XL) 50 MG 24 hr tablet Take 1 tablet (50 mg) by mouth daily 90 tablet 1     multiple vitamin  tablet TABS Take 1 tablet by mouth daily       omeprazole 20 MG tablet Take 1 tablet (20 mg) by mouth daily Take 30-60 minutes before a meal. 60 tablet 5     Mammogram Screening: Mammogram Screening: Patient over age  "50, mutual decision to screen reflected in health maintenance.    Review of Systems  Constitutional, HEENT, cardiovascular, pulmonary, gi and gu systems are negative, except as otherwise noted.    OBJECTIVE:   There were no vitals taken for this visit. Estimated body mass index is 32.44 kg/m  as calculated from the following:    Height as of 8/6/18: 1.676 m (5' 6\").    Weight as of 8/6/18: 91.2 kg (201 lb).  Physical Exam  GENERAL: healthy, alert and no distress  EYES: Eyes grossly normal to inspection, PERRL and conjunctivae and sclerae normal  RESP: lungs clear to auscultation - no rales, rhonchi or wheezes  CV: regular rate and rhythm, normal S1 S2, no S3 or S4, no murmur, click or rub, no peripheral edema and peripheral pulses strong  ABDOMEN: soft, nontender, no hepatosplenomegaly, no masses and bowel sounds normal  PSYCH: mentation appears normal, affect normal/bright    Diagnostic Test Results:  Labs reviewed in Epic    ASSESSMENT / PLAN:       ICD-10-CM    1. Healthcare maintenance  Z00.00 CBC with platelets     Fecal colorectal cancer screen FIT     TDAP VACCINE   2. Special screening for malignant neoplasms, colon  Z12.11 MA Screening Digital Bilateral   3. Encounter for screening mammogram for breast cancer  Z12.31    4. Essential hypertension with goal blood pressure less than 140/90  I10 amLODIPine (NORVASC) 10 MG tablet     Basic metabolic panel     ALT     Lipid Profile     TSH   5. Chest pain, unspecified type  R07.9 atorvastatin (LIPITOR) 40 MG tablet   6. Other specified hypothyroidism  E03.8 levothyroxine (SYNTHROID/LEVOTHROID) 88 MCG tablet   7. Essential hypertension  I10 lisinopril (ZESTRIL) 40 MG tablet     metoprolol succinate ER (TOPROL-XL) 50 MG 24 hr tablet   8. Gastroesophageal reflux disease without esophagitis  K21.9 omeprazole 20 MG tablet       COUNSELING:  Reviewed preventive health counseling, as reflected in patient instructions    Estimated body mass index is 32.44 kg/m  as " "calculated from the following:    Height as of 8/6/18: 1.676 m (5' 6\").    Weight as of 8/6/18: 91.2 kg (201 lb).         reports that she has never smoked. She has never used smokeless tobacco.      Appropriate preventive services were discussed with this patient, including applicable screening as appropriate for cardiovascular disease, diabetes, osteopenia/osteoporosis, and glaucoma.  As appropriate for age/gender, discussed screening for colorectal cancer, prostate cancer, breast cancer, and cervical cancer. Checklist reviewing preventive services available has been given to the patient.    Reviewed patients plan of care and provided an AVS. The Basic Care Plan (routine screening as documented in Health Maintenance) for Jennifer meets the Care Plan requirement. This Care Plan has been established and reviewed with the Patient and other:.    Counseling Resources:  ATP IV Guidelines  Pooled Cohorts Equation Calculator  Breast Cancer Risk Calculator  FRAX Risk Assessment  ICSI Preventive Guidelines  Dietary Guidelines for Americans, 2010  USDA's MyPlate  ASA Prophylaxis  Lung CA Screening    Dinah Molina NP  [facility]    Identified Health Risks:  "

## 2020-08-18 NOTE — NURSING NOTE
Prior to immunization administration, verified patients identity using patient s name and date of birth. Please see Immunization Activity for additional information.     Screening Questionnaire for Adult Immunization    Are you sick today?   No   Do you have allergies to medications, food, a vaccine component or latex?   No   Have you ever had a serious reaction after receiving a vaccination?   No   Do you have a long-term health problem with heart, lung, kidney, or metabolic disease (e.g., diabetes), asthma, a blood disorder, no spleen, complement component deficiency, a cochlear implant, or a spinal fluid leak?  Are you on long-term aspirin therapy?   No   Do you have cancer, leukemia, HIV/AIDS, or any other immune system problem?   No   Do you have a parent, brother, or sister with an immune system problem?   No   In the past 3 months, have you taken medications that affect  your immune system, such as prednisone, other steroids, or anticancer drugs; drugs for the treatment of rheumatoid arthritis, Crohn s disease, or psoriasis; or have you had radiation treatments?   No   Have you had a seizure, or a brain or other nervous system problem?   No   During the past year, have you received a transfusion of blood or blood    products, or been given immune (gamma) globulin or antiviral drug?   No   For women: Are you pregnant or is there a chance you could become       pregnant during the next month?   No   Have you received any vaccinations in the past 4 weeks?   No     Immunization questionnaire answers were all negative.        Per orders of Dr. Dinah Molina CNP, injection of Tdap given by Leora Motta MA. Patient instructed to remain in clinic for 15 minutes afterwards, and to report any adverse reaction to me immediately.       Screening performed by Leora Motta MA on 8/18/2020 at 12:15 PM.

## 2020-08-19 ENCOUNTER — APPOINTMENT (OUTPATIENT)
Dept: INTERPRETER SERVICES | Facility: CLINIC | Age: 70
End: 2020-08-19
Payer: MEDICARE

## 2020-08-19 LAB
ALT SERPL W P-5'-P-CCNC: 30 U/L (ref 0–50)
ANION GAP SERPL CALCULATED.3IONS-SCNC: 7 MMOL/L (ref 3–14)
BUN SERPL-MCNC: 12 MG/DL (ref 7–30)
CALCIUM SERPL-MCNC: 8.8 MG/DL (ref 8.5–10.1)
CHLORIDE SERPL-SCNC: 106 MMOL/L (ref 94–109)
CHOLEST SERPL-MCNC: 168 MG/DL
CO2 SERPL-SCNC: 27 MMOL/L (ref 20–32)
CREAT SERPL-MCNC: 0.59 MG/DL (ref 0.52–1.04)
GFR SERPL CREATININE-BSD FRML MDRD: >90 ML/MIN/{1.73_M2}
GLUCOSE SERPL-MCNC: 109 MG/DL (ref 70–99)
HDLC SERPL-MCNC: 39 MG/DL
LDLC SERPL CALC-MCNC: 104 MG/DL
NONHDLC SERPL-MCNC: 129 MG/DL
POTASSIUM SERPL-SCNC: 3.8 MMOL/L (ref 3.4–5.3)
SODIUM SERPL-SCNC: 140 MMOL/L (ref 133–144)
TRIGL SERPL-MCNC: 123 MG/DL
TSH SERPL DL<=0.005 MIU/L-ACNC: 2.76 MU/L (ref 0.4–4)

## 2020-08-20 ENCOUNTER — APPOINTMENT (OUTPATIENT)
Dept: INTERPRETER SERVICES | Facility: CLINIC | Age: 70
End: 2020-08-20
Payer: MEDICARE

## 2021-06-06 DIAGNOSIS — I10 ESSENTIAL HYPERTENSION: ICD-10-CM

## 2021-06-06 DIAGNOSIS — I10 ESSENTIAL HYPERTENSION WITH GOAL BLOOD PRESSURE LESS THAN 140/90: ICD-10-CM

## 2021-06-06 DIAGNOSIS — E03.8 OTHER SPECIFIED HYPOTHYROIDISM: ICD-10-CM

## 2021-06-08 NOTE — TELEPHONE ENCOUNTER
Routing refill request to provider for review/approval because:  BP Readings from Last 3 Encounters:   08/06/18 132/74   07/20/18 148/78   07/07/18 128/82

## 2021-06-09 RX ORDER — METOPROLOL SUCCINATE 50 MG/1
TABLET, EXTENDED RELEASE ORAL
Qty: 90 TABLET | Refills: 0 | Status: SHIPPED | OUTPATIENT
Start: 2021-06-09 | End: 2021-09-16

## 2021-06-09 RX ORDER — AMLODIPINE BESYLATE 10 MG/1
TABLET ORAL
Qty: 90 TABLET | Refills: 0 | Status: SHIPPED | OUTPATIENT
Start: 2021-06-09 | End: 2021-09-16

## 2021-06-09 RX ORDER — LEVOTHYROXINE SODIUM 88 UG/1
TABLET ORAL
Qty: 90 TABLET | Refills: 0 | Status: SHIPPED | OUTPATIENT
Start: 2021-06-09 | End: 2021-09-16

## 2021-08-24 DIAGNOSIS — K21.9 GASTROESOPHAGEAL REFLUX DISEASE WITHOUT ESOPHAGITIS: ICD-10-CM

## 2021-08-25 NOTE — TELEPHONE ENCOUNTER
Medication is being filled for 1 time refill only due to:  Patient needs to be seen because it has been more than one year since last visit.     Needs appt  Tho Loyd RN, BSN

## 2021-09-16 ENCOUNTER — OFFICE VISIT (OUTPATIENT)
Dept: INTERNAL MEDICINE | Facility: CLINIC | Age: 71
End: 2021-09-16
Payer: MEDICARE

## 2021-09-16 ENCOUNTER — ANCILLARY PROCEDURE (OUTPATIENT)
Dept: GENERAL RADIOLOGY | Facility: CLINIC | Age: 71
End: 2021-09-16
Attending: NURSE PRACTITIONER
Payer: MEDICARE

## 2021-09-16 VITALS
WEIGHT: 188.9 LBS | RESPIRATION RATE: 16 BRPM | HEIGHT: 66 IN | OXYGEN SATURATION: 96 % | DIASTOLIC BLOOD PRESSURE: 78 MMHG | HEART RATE: 115 BPM | BODY MASS INDEX: 30.36 KG/M2 | SYSTOLIC BLOOD PRESSURE: 142 MMHG | TEMPERATURE: 98.1 F

## 2021-09-16 DIAGNOSIS — M25.50 MULTIPLE JOINT PAIN: ICD-10-CM

## 2021-09-16 DIAGNOSIS — K21.9 GASTROESOPHAGEAL REFLUX DISEASE WITHOUT ESOPHAGITIS: ICD-10-CM

## 2021-09-16 DIAGNOSIS — E03.8 OTHER SPECIFIED HYPOTHYROIDISM: ICD-10-CM

## 2021-09-16 DIAGNOSIS — I10 ESSENTIAL HYPERTENSION: ICD-10-CM

## 2021-09-16 DIAGNOSIS — Z78.9 HEALTH MAINTENANCE ALTERATION: Primary | ICD-10-CM

## 2021-09-16 DIAGNOSIS — R07.9 CHEST PAIN, UNSPECIFIED TYPE: ICD-10-CM

## 2021-09-16 DIAGNOSIS — I10 ESSENTIAL HYPERTENSION WITH GOAL BLOOD PRESSURE LESS THAN 140/90: ICD-10-CM

## 2021-09-16 DIAGNOSIS — E78.5 HYPERLIPIDEMIA WITH TARGET LDL LESS THAN 130: ICD-10-CM

## 2021-09-16 LAB
ERYTHROCYTE [DISTWIDTH] IN BLOOD BY AUTOMATED COUNT: 14.6 % (ref 10–15)
HCT VFR BLD AUTO: 43.4 % (ref 35–47)
HGB BLD-MCNC: 14.6 G/DL (ref 11.7–15.7)
MCH RBC QN AUTO: 28.1 PG (ref 26.5–33)
MCHC RBC AUTO-ENTMCNC: 33.6 G/DL (ref 31.5–36.5)
MCV RBC AUTO: 84 FL (ref 78–100)
PLATELET # BLD AUTO: 278 10E3/UL (ref 150–450)
RBC # BLD AUTO: 5.19 10E6/UL (ref 3.8–5.2)
WBC # BLD AUTO: 5 10E3/UL (ref 4–11)

## 2021-09-16 PROCEDURE — 36415 COLL VENOUS BLD VENIPUNCTURE: CPT | Performed by: NURSE PRACTITIONER

## 2021-09-16 PROCEDURE — 80053 COMPREHEN METABOLIC PANEL: CPT | Performed by: NURSE PRACTITIONER

## 2021-09-16 PROCEDURE — 99214 OFFICE O/P EST MOD 30 MIN: CPT | Performed by: NURSE PRACTITIONER

## 2021-09-16 PROCEDURE — 73130 X-RAY EXAM OF HAND: CPT | Mod: 50 | Performed by: RADIOLOGY

## 2021-09-16 PROCEDURE — 85027 COMPLETE CBC AUTOMATED: CPT | Performed by: NURSE PRACTITIONER

## 2021-09-16 PROCEDURE — 80061 LIPID PANEL: CPT | Performed by: NURSE PRACTITIONER

## 2021-09-16 PROCEDURE — 86038 ANTINUCLEAR ANTIBODIES: CPT | Performed by: NURSE PRACTITIONER

## 2021-09-16 PROCEDURE — 84443 ASSAY THYROID STIM HORMONE: CPT | Performed by: NURSE PRACTITIONER

## 2021-09-16 PROCEDURE — 86431 RHEUMATOID FACTOR QUANT: CPT | Performed by: NURSE PRACTITIONER

## 2021-09-16 PROCEDURE — 73565 X-RAY EXAM OF KNEES: CPT | Performed by: RADIOLOGY

## 2021-09-16 RX ORDER — AMLODIPINE BESYLATE 10 MG/1
10 TABLET ORAL DAILY
Qty: 90 TABLET | Refills: 0 | Status: SHIPPED | OUTPATIENT
Start: 2021-09-16 | End: 2021-12-29

## 2021-09-16 RX ORDER — ATORVASTATIN CALCIUM 40 MG/1
40 TABLET, FILM COATED ORAL DAILY
Qty: 90 TABLET | Refills: 2 | Status: SHIPPED | OUTPATIENT
Start: 2021-09-16 | End: 2022-10-12

## 2021-09-16 RX ORDER — METOPROLOL SUCCINATE 50 MG/1
50 TABLET, EXTENDED RELEASE ORAL DAILY
Qty: 90 TABLET | Refills: 0 | Status: SHIPPED | OUTPATIENT
Start: 2021-09-16 | End: 2021-12-29

## 2021-09-16 RX ORDER — LEVOTHYROXINE SODIUM 88 UG/1
88 TABLET ORAL DAILY
Qty: 90 TABLET | Refills: 0 | Status: SHIPPED | OUTPATIENT
Start: 2021-09-16 | End: 2021-12-17

## 2021-09-16 RX ORDER — LISINOPRIL 40 MG/1
40 TABLET ORAL DAILY
Qty: 30 TABLET | Refills: 0 | Status: SHIPPED | OUTPATIENT
Start: 2021-09-16 | End: 2021-09-17

## 2021-09-16 ASSESSMENT — MIFFLIN-ST. JEOR: SCORE: 1398.59

## 2021-09-16 NOTE — NURSING NOTE
"patient i shaving difficultly walking/standing, has swollen hands abd finger  ongoing 2 months.  Vital signs:  Temp: 98.1  F (36.7  C) Temp src: Oral BP: (!) 142/78 Pulse: 115   Resp: 16 SpO2: 96 %     Height: 167.6 cm (5' 6\") Weight: 85.7 kg (188 lb 14.4 oz)  Estimated body mass index is 30.49 kg/m  as calculated from the following:    Height as of this encounter: 1.676 m (5' 6\").    Weight as of this encounter: 85.7 kg (188 lb 14.4 oz).  Vital signs:  Temp: 98.1  F (36.7  C) Temp src: Oral BP: (!) 142/78 Pulse: 115   Resp: 16 SpO2: 96 %     Height: 167.6 cm (5' 6\") Weight: 85.7 kg (188 lb 14.4 oz)  Estimated body mass index is 30.49 kg/m  as calculated from the following:    Height as of this encounter: 1.676 m (5' 6\").    Weight as of this encounter: 85.7 kg (188 lb 14.4 oz).        "

## 2021-09-16 NOTE — PROGRESS NOTES
"    Assessment & Plan     Other specified hypothyroidism    - TSH; Future  - levothyroxine (SYNTHROID/LEVOTHROID) 88 MCG tablet; Take 1 tablet (88 mcg) by mouth daily    Gastroesophageal reflux disease without esophagitis    - omeprazole (PRILOSEC) 20 MG DR capsule; Take 1 capsule (20 mg) by mouth daily 30-60 minutes before a meal.    Hyperlipidemia with target LDL less than 130    - Lipid panel reflex to direct LDL Non-fasting; Future    Essential hypertension    - CBC with platelets; Future  - Comprehensive metabolic panel (BMP + Alb, Alk Phos, ALT, AST, Total. Bili, TP); Future  - lisinopril (ZESTRIL) 40 MG tablet; Take 1 tablet (40 mg) by mouth daily  - metoprolol succinate ER (TOPROL-XL) 50 MG 24 hr tablet; Take 1 tablet (50 mg) by mouth daily    Health maintenance alteration      Multiple joint pain    - Rheumatoid factor; Future  - Anti Nuclear Lainey IgG by IFA with Reflex; Future  - XR Hand Bilateral G/E 3 Views; Future  - XR Knee AP Standing Bilateral; Future    Essential hypertension with goal blood pressure less than 140/90    - amLODIPine (NORVASC) 10 MG tablet; Take 1 tablet (10 mg) by mouth daily    Chest pain, unspecified type    - atorvastatin (LIPITOR) 40 MG tablet; Take 1 tablet (40 mg) by mouth daily             BMI:   Estimated body mass index is 30.49 kg/m  as calculated from the following:    Height as of this encounter: 1.676 m (5' 6\").    Weight as of this encounter: 85.7 kg (188 lb 14.4 oz).   Weight management plan: Discussed healthy diet and exercise guidelines        Dinah Molina NP  [facility]    Grayson Rodriguez is a 71 year old who presents for the following health issues  accompanied by her daughter:    HPI     Hyperlipidemia Follow-Up      Are you regularly taking any medication or supplement to lower your cholesterol?   Yes- statin    Are you having muscle aches or other side effects that you think could be caused by your cholesterol lowering " "medication?  No    Hypertension Follow-up      Do you check your blood pressure regularly outside of the clinic? Yes     Are you following a low salt diet? Yes    Are your blood pressures ever more than 140 on the top number (systolic) OR more   than 90 on the bottom number (diastolic), for example 140/90? Yes    Hypothyroidism Follow-up      Since last visit, patient describes the following symptoms: weight loss, poor appetite     Musculoskeletal problem/pain  Onset/Duration: 2 months  Description  Location: bilateral hands, knees -   Joint Swelling: YES  Redness: no  Pain: YES  Warmth: no  Intensity:  moderate, severe  Progression of Symptoms:  worsening  Accompanying signs and symptoms:   Fevers: no  Numbness/tingling/weakness: no  History  Trauma to the area: no  Recent illness:  no  Previous similar problem: no  Previous evaluation:  no  Precipitating or alleviating factors:  Aggravating factors include: standing, walking and climbing stairs  Therapies tried and outcome: nothing      Review of Systems   CONSTITUTIONAL:POSITIVE  for anorexia, arthralgias, fatigue, malaise and weight loss  ENT/MOUTH: NEGATIVE for ear, mouth and throat problems  RESP: NEGATIVE for significant cough or SOB  CV: NEGATIVE for chest pain, palpitations or peripheral edema  GI: POSITIVE for dyspepsia, dysphagia, poor appetite and weight loss  NEURO: POSITIVE for   PSYCHIATRIC: POSITIVE fordepressed mood      Objective    BP (!) 142/78   Pulse 115   Temp 98.1  F (36.7  C) (Oral)   Resp 16   Ht 1.676 m (5' 6\")   Wt 85.7 kg (188 lb 14.4 oz)   LMP  (LMP Unknown)   SpO2 96%   BMI 30.49 kg/m    Body mass index is 30.49 kg/m .  Physical Exam   GENERAL: alert and fatigued  EYES: Eyes grossly normal to inspection, PERRL and conjunctivae and sclerae normal  NECK: no adenopathy, no asymmetry, masses, or scars and thyroid normal to palpation  RESP: lungs clear to auscultation - no rales, rhonchi or wheezes  CV: regular rate and rhythm, " normal S1 S2, no S3 or S4, no murmur, click or rub, no peripheral edema and peripheral pulses strong  ABDOMEN: soft, nontender, no hepatosplenomegaly, no masses and bowel sounds normal  MS: bilateral hands Multiple MCP joints enlarged, tender, not warm,  weak.  R knee DJD changes  PSYCH: tangential and affect flat

## 2021-09-17 LAB
ALBUMIN SERPL-MCNC: 3 G/DL (ref 3.4–5)
ALP SERPL-CCNC: 326 U/L (ref 40–150)
ALT SERPL W P-5'-P-CCNC: 169 U/L (ref 0–50)
ANION GAP SERPL CALCULATED.3IONS-SCNC: 8 MMOL/L (ref 3–14)
AST SERPL W P-5'-P-CCNC: 128 U/L (ref 0–45)
BILIRUB SERPL-MCNC: 0.4 MG/DL (ref 0.2–1.3)
BUN SERPL-MCNC: 12 MG/DL (ref 7–30)
CALCIUM SERPL-MCNC: 9.2 MG/DL (ref 8.5–10.1)
CHLORIDE BLD-SCNC: 105 MMOL/L (ref 94–109)
CHOLEST SERPL-MCNC: 178 MG/DL
CO2 SERPL-SCNC: 25 MMOL/L (ref 20–32)
CREAT SERPL-MCNC: 0.64 MG/DL (ref 0.52–1.04)
FASTING STATUS PATIENT QL REPORTED: NO
GFR SERPL CREATININE-BSD FRML MDRD: >90 ML/MIN/1.73M2
GLUCOSE BLD-MCNC: 102 MG/DL (ref 70–99)
HDLC SERPL-MCNC: 37 MG/DL
LDLC SERPL CALC-MCNC: 125 MG/DL
NONHDLC SERPL-MCNC: 141 MG/DL
POTASSIUM BLD-SCNC: 4.2 MMOL/L (ref 3.4–5.3)
PROT SERPL-MCNC: 8 G/DL (ref 6.8–8.8)
SODIUM SERPL-SCNC: 138 MMOL/L (ref 133–144)
TRIGL SERPL-MCNC: 82 MG/DL
TSH SERPL DL<=0.005 MIU/L-ACNC: 3.77 MU/L (ref 0.4–4)

## 2021-09-19 RX ORDER — LISINOPRIL 40 MG/1
TABLET ORAL
Qty: 90 TABLET | Refills: 0 | Status: SHIPPED | OUTPATIENT
Start: 2021-09-19 | End: 2022-03-16

## 2021-09-20 ENCOUNTER — TELEPHONE (OUTPATIENT)
Dept: INTERNAL MEDICINE | Facility: CLINIC | Age: 71
End: 2021-09-20

## 2021-09-20 DIAGNOSIS — M79.642 BILATERAL HAND PAIN: ICD-10-CM

## 2021-09-20 DIAGNOSIS — M79.641 BILATERAL HAND PAIN: ICD-10-CM

## 2021-09-20 DIAGNOSIS — M25.561 PAIN IN BOTH KNEES, UNSPECIFIED CHRONICITY: Primary | ICD-10-CM

## 2021-09-20 DIAGNOSIS — M25.562 PAIN IN BOTH KNEES, UNSPECIFIED CHRONICITY: Primary | ICD-10-CM

## 2021-09-20 LAB
ANA SER QL IF: NEGATIVE
RHEUMATOID FACT SER NEPH-ACNC: 195 IU/ML

## 2021-09-20 NOTE — TELEPHONE ENCOUNTER
Please advise pt xrays of knees and hands show advanced degenerative changes, ie osteoarthritis.  Referral done for Ortho specialty evaluation  Dinah Molina CNP

## 2021-09-22 NOTE — TELEPHONE ENCOUNTER
Please advise pt labs show increased inflammatory markers and Rheumatoid factor, referral to Rheumatology sent for further evaluation and management.  Also, DME order done for wheeled walker with seat, please fax as requested.  Dinah Molina CNP

## 2021-09-23 ENCOUNTER — NURSE TRIAGE (OUTPATIENT)
Dept: NURSING | Facility: CLINIC | Age: 71
End: 2021-09-23

## 2021-09-23 NOTE — TELEPHONE ENCOUNTER
Reason for Disposition    [1] Follow-up call to recent contact AND [2] information only call, no triage required    Protocols used: INFORMATION ONLY CALL - NO TRIAGE-A-AH    Daughter is returning clinic call  Gave message per kamran Godoy RN [facility]

## 2021-10-01 ENCOUNTER — APPOINTMENT (OUTPATIENT)
Dept: URGENT CARE | Facility: CLINIC | Age: 71
End: 2021-10-01

## 2021-10-01 ENCOUNTER — VIRTUAL VISIT (OUTPATIENT)
Dept: FAMILY MEDICINE | Facility: CLINIC | Age: 71
End: 2021-10-01
Payer: MEDICARE

## 2021-10-01 DIAGNOSIS — R05.9 COUGH: Primary | ICD-10-CM

## 2021-10-01 DIAGNOSIS — M06.9 RHEUMATOID ARTHRITIS, INVOLVING UNSPECIFIED SITE, UNSPECIFIED WHETHER RHEUMATOID FACTOR PRESENT (H): ICD-10-CM

## 2021-10-01 PROCEDURE — 99213 OFFICE O/P EST LOW 20 MIN: CPT | Mod: 95 | Performed by: NURSE PRACTITIONER

## 2021-10-01 NOTE — PROGRESS NOTES
Jennifer is a 71 year old who is being evaluated via a billable video visit.      How would you like to obtain your AVS? Mail a copy  If the video visit is dropped, the invitation should be resent by: Text to cell phone: [phone]  Will anyone else be joining your video visit? Yes: Lisa. How would they like to receive their invitation? Text to cell phone: [phone]    Video Start Time: 4:39 PM    Assessment & Plan   Problem List Items Addressed This Visit     None      Visit Diagnoses     Cough    -  Primary    Relevant Orders    Symptomatic COVID-19 Virus (Coronavirus) by PCR Nose    Streptococcus A Rapid Scr w Reflx to PCR - Lab Collect         COVID 19 testing   Strep testing.  Let us know if changing symptoms.     Review of the result(s) of each unique test - lab  Ordering of each unique test  No LOS data to display   Time spent doing chart review, history and exam, documentation and further activities per the note       See Patient Instructions    No follow-ups on file.    Bernarda De La Cruz CNP  [facility]   Jennifer is a 71 year old who presents for the following health issues  accompanied by her daughter:    HPI     Acute Illness  Acute illness concerns: body aches, headache, cough, sore throat  Onset/Duration: started yesterday  Symptoms:  Fever: yes, last night she felt really hot  Chills/Sweats: no  Headache (location?): YES  Sinus Pressure: no  Conjunctivitis:  no  Ear Pain: no  Rhinorrhea: no  Congestion: no  Sore Throat: YES  Cough: YES  Wheeze: no  Decreased Appetite: no  Nausea: no  Vomiting: no  Diarrhea: no  Dysuria/Freq.: no  Dysuria or Hematuria: no  Fatigue/Achiness: YES  Sick/Strep Exposure: YES, same person, same symptoms no results yet  Therapies tried and outcome: tylenol and nyquil      Review of Systems   Constitutional, HEENT, cardiovascular, pulmonary, GI, , musculoskeletal, neuro, skin, endocrine and psych systems are negative, except as otherwise  noted.      Objective           Vitals:  No vitals were obtained today due to virtual visit.    Physical Exam   GENERAL: Healthy, alert and no distress  EYES: Eyes grossly normal to inspection.  No discharge or erythema, or obvious scleral/conjunctival abnormalities.  RESP: No audible wheeze, cough, or visible cyanosis.  No visible retractions or increased work of breathing.    SKIN: Visible skin clear. No significant rash, abnormal pigmentation or lesions.  NEURO: Cranial nerves grossly intact.  Mentation and speech appropriate for age.  PSYCH: Mentation appears normal, affect normal/bright, judgement and insight intact, normal speech and appearance well-groomed.          Video-Visit Details    Type of service:  Video Visit    Video End Time:4:48 PM    Originating Location (pt. Location): Home    Distant Location (provider location):  [facility]     Platform used for Video Visit: [employer]

## 2021-10-04 PROBLEM — M06.9 RHEUMATOID ARTHRITIS (H): Status: ACTIVE | Noted: 2021-10-04

## 2021-11-05 ENCOUNTER — OFFICE VISIT (OUTPATIENT)
Dept: ORTHOPEDICS | Facility: CLINIC | Age: 71
End: 2021-11-05
Payer: MEDICARE

## 2021-11-05 VITALS
SYSTOLIC BLOOD PRESSURE: 140 MMHG | DIASTOLIC BLOOD PRESSURE: 84 MMHG | BODY MASS INDEX: 30.22 KG/M2 | HEIGHT: 66 IN | WEIGHT: 188 LBS

## 2021-11-05 DIAGNOSIS — M17.0 PRIMARY OSTEOARTHRITIS OF BOTH KNEES: ICD-10-CM

## 2021-11-05 DIAGNOSIS — M05.79 RHEUMATOID ARTHRITIS INVOLVING MULTIPLE SITES WITH POSITIVE RHEUMATOID FACTOR (H): Primary | ICD-10-CM

## 2021-11-05 PROCEDURE — 20611 DRAIN/INJ JOINT/BURSA W/US: CPT | Mod: 50 | Performed by: STUDENT IN AN ORGANIZED HEALTH CARE EDUCATION/TRAINING PROGRAM

## 2021-11-05 PROCEDURE — 99204 OFFICE O/P NEW MOD 45 MIN: CPT | Mod: 25 | Performed by: STUDENT IN AN ORGANIZED HEALTH CARE EDUCATION/TRAINING PROGRAM

## 2021-11-05 RX ORDER — LIDOCAINE HYDROCHLORIDE 10 MG/ML
4 INJECTION, SOLUTION INFILTRATION; PERINEURAL
Status: DISCONTINUED | OUTPATIENT
Start: 2021-11-05 | End: 2024-01-05

## 2021-11-05 RX ORDER — TRIAMCINOLONE ACETONIDE 40 MG/ML
40 INJECTION, SUSPENSION INTRA-ARTICULAR; INTRAMUSCULAR
Status: DISCONTINUED | OUTPATIENT
Start: 2021-11-05 | End: 2024-01-05

## 2021-11-05 RX ADMIN — TRIAMCINOLONE ACETONIDE 40 MG: 40 INJECTION, SUSPENSION INTRA-ARTICULAR; INTRAMUSCULAR at 15:23

## 2021-11-05 RX ADMIN — LIDOCAINE HYDROCHLORIDE 4 ML: 10 INJECTION, SOLUTION INFILTRATION; PERINEURAL at 15:23

## 2021-11-05 ASSESSMENT — MIFFLIN-ST. JEOR: SCORE: 1394.51

## 2021-11-05 NOTE — LETTER
11/5/2021         RE: Jennifer Stinson  [address]  [place] [address]        Dear Colleague,    Thank you for referring your patient, Jennifer Stinson, to the [facility] [place]. Please see a copy of my visit note below.    ASSESSMENT & PLAN    1. Rheumatoid arthritis involving multiple sites with positive rheumatoid factor (H)    2. Primary osteoarthritis of both knees      Jennifer Stinson is a 71 year old female presenting for evaluation of bilateral knee pain as well as diffuse joint pain and swelling. History, examination, laboratory (+RA) and imaging findings were reviewed today, consistent with rheumatoid arthritis with advanced tricompartmental degenerative joint disease of both knees. We discussed management options and plan to proceed with the following:    Referral to Rheumatology at your earliest convenience. The [facility]  will call you to coordinate your care as prescribed by your provider. A representative will call you within 1 business day to help schedule your appointment, or you may contact the  Representative at [phone].    Ultrasound-guided bilateral knee cortisone injections were performed today without complication. Post-injection instructions provided below.    Physical therapy for knee strengthening and stabilization, hip/core strengthening and biomechanic assessment. Please stop by the  or call [phone] to schedule your appointment with [facility].    Heat and compression can be very helpful for arthritis. Ice can be used 10-15 minutes 3-4 times per day as needed for arthritis flares.     Turmeric with black pepper is an herbal anti-inflammatory supplement that has been found to be equally effective as Ibuprofen for treatment of knee arthritis pain and inflammation. We recommend the brand Pure Encapsulations and the products is called CurcumaSorb 180. Pure Encapsulations  is of high quality and free of additives/allergens. For an anti-inflammatory dose, please take 2 tablets 1-2 times per day between meals (ie between breakfast-lunch and/or between lunch-dinner). This brand is available at [employer] or [employer].    Please schedule a follow up appointment to see me in 6-8 weeks, or sooner as needed for persistence or worsening of pain. Call direct clinic number ([phone]) at any time with questions or concerns.    Melinda Gilliam MD, CAQSM  [facility]    [facility] Injection Discharge Instructions      You may shower, however avoid swimming, tub baths or hot tubs for 24 hours following your procedure    You may have a mild to moderate increase in pain for several days following the injection.    It may take up to 14 days for the steroid medication to start working although you may feel the effect as early as a few days after the procedure.    You may use ice packs for 10-15 minutes, 3 to 4 times a day at the injection site for comfort    You may use anti-inflammatory medications (such as Ibuprofen or Aleve or Advil) or Tylenol for pain control if necessary    If you were fasting, you may resume your normal diet and medications after the procedure    If you have diabetes, check your blood sugar more frequently than usual as your blood sugar may be higher than normal for 10-14 days following a steroid injection. Contact your doctor who manages your diabetes if your blood sugar is higher than usual      If you experience any of the following, call [facility] @ [phone]  -Fever over 100 degree F  -Swelling, bleeding, redness, drainage, warmth at the injection site  -New or worsening pain    -----    SUBJECTIVE  Jennifer Stinson is a/an 71 year old female who is seen in consultation at the request of  Dinha Molina N.P. for evaluation of bilateral knee pain. The patient is seen with their son.    Onset: 3 month(s) ago.  Reports insidious onset without acute precipitating event.  Location of Pain: bilateral knees. Also notes pain and swelling of multiple other joints most significant in the knuckles (MCP joints) of both of the hands and the base of the thumbs. Currently, the knee pain is the most significant.  Rating of Pain at worst: 10/10  Rating of Pain Currently: 10/10  Worsened by: walking, standing, transitioning from sit to stand, pushing  Better with: topical cream, massage provides a little relief  Treatments tried: rest/activity avoidance, heat, Tylenol, previous imaging (xray 9/16/21 - bilateral knees and hands) and topical creams  Associated symptoms: swelling in knees and hands/wrists  Orthopedic history: YES - patient also complains of left sided back (spine) pain, neck pain, bilateral elbow pain and bilateral shoulder pain  Relevant surgical history: NO  Social history: social history: retired    Past Medical History:   Diagnosis Date     GERD (gastroesophageal reflux disease)      Hyperlipidemia LDL goal < 130      Hypertension      Hypothyroidism      Stroke (H)      Social History     Socioeconomic History     Marital status:      Spouse name: Not on file     Number of children: Not on file     Years of education: Not on file     Highest education level: Not on file   Occupational History     Not on file   Tobacco Use     Smoking status: Never Smoker     Smokeless tobacco: Never Used   Vaping Use     Vaping Use: Never used   Substance and Sexual Activity     Alcohol use: No     Drug use: No     Sexual activity: Not Currently   Other Topics Concern     Parent/sibling w/ CABG, MI or angioplasty before 65F 55M? Not Asked   Social History Narrative    ** Merged History Encounter **          Social Determinants of Health     Financial Resource Strain:      Difficulty of Paying Living Expenses:    Food Insecurity:      Worried About Running Out of Food in the Last Year:      Ran Out of Food in the Last Year:   "  Transportation Needs:      Lack of Transportation (Medical):      Lack of Transportation (Non-Medical):    Physical Activity:      Days of Exercise per Week:      Minutes of Exercise per Session:    Stress:      Feeling of Stress :    Social Connections:      Frequency of Communication with Friends and Family:      Frequency of Social Gatherings with Friends and Family:      Attends Confucianism Services:      Active Member of Clubs or Organizations:      Attends Club or Organization Meetings:      Marital Status:    Intimate Partner Violence:      Fear of Current or Ex-Partner:      Emotionally Abused:      Physically Abused:      Sexually Abused:          Patient's past medical, surgical, social, and family histories were reviewed today and no changes are noted.    REVIEW OF SYSTEMS:  10 point ROS is negative other than symptoms noted above in HPI, Past Medical History or as stated below  Constitutional: NEGATIVE for fever, chills, change in weight  Skin: NEGATIVE for worrisome rashes, moles or lesions  GI/: NEGATIVE for bowel or bladder changes  Neuro: NEGATIVE for weakness, dizziness or paresthesias    OBJECTIVE:  BP (!) 140/84   Ht 1.676 m (5' 6\")   Wt 85.3 kg (188 lb)   LMP  (LMP Unknown)   BMI 30.34 kg/m     General: healthy, alert and in no distress  HEENT: no scleral icterus or conjunctival erythema  Skin: no suspicious lesions or rash. No jaundice.  CV: no pedal edema  Resp: normal respiratory effort without conversational dyspnea   Psych: normal mood and affect  Gait: normal steady gait with appropriate coordination and balance  Neuro: Normal light sensory exam of lower extremity  MSK:  BILATERAL KNEE  Inspection:    normal alignment  Palpation:    Tender about the medial patellar facet, lateral joint line, medial joint line and popliteal region. Remainder of bony and ligamentous landmarks are nontender.    Small effusion is present    Patellofemoral crepitus is Present  Range of Motion:     00 " extension to 1000 flexion  Strength:    Quadriceps 5-/5, hamstrings 5-/5 and gluteus medius 4+/5    Extensor mechanism intact  Special Tests:    Positive: Patellar grind, painful Char's    Negative: MCL/valgus stress (0 & 30 deg), LCL/varus stress (0 & 30 deg), Lachman's, anterior drawer, posterior drawer    Independent visualization of the below image:  No results found for this or any previous visit (from the past 24 hour(s)).    Large Joint Injection/Arthocentesis: bilateral knee    Date/Time: 11/5/2021 3:23 PM  Performed by: Melinda Salcedo MD  Authorized by: Melinda Salcedo MD     Indications:  Pain and osteoarthritis  Needle Size:  25 G  Guidance: ultrasound    Approach:  Anterolateral  Location:  Knee  Laterality:  Bilateral      Medications (Right):  40 mg triamcinolone 40 MG/ML; 4 mL lidocaine 1 %  Medications (Left):  40 mg triamcinolone 40 MG/ML; 4 mL lidocaine 1 %  Outcome:  Tolerated well, no immediate complications  Procedure discussed: discussed risks, benefits, and alternatives    Consent Given by:  Patient  Timeout: timeout called immediately prior to procedure    Prep: patient was prepped and draped in usual sterile fashion          Melinda Gilliam MD, [facility]        Again, thank you for allowing me to participate in the care of your patient.        Sincerely,        Melinda Gilliam MD

## 2021-11-05 NOTE — PROGRESS NOTES
ASSESSMENT & PLAN    1. Rheumatoid arthritis involving multiple sites with positive rheumatoid factor (H)    2. Primary osteoarthritis of both knees      Jennifer Stinson is a 71 year old female presenting for evaluation of bilateral knee pain as well as diffuse joint pain and swelling. History, examination, laboratory (+RA) and imaging findings were reviewed today, consistent with rheumatoid arthritis with advanced tricompartmental degenerative joint disease of both knees. We discussed management options and plan to proceed with the following:    Referral to Rheumatology at your earliest convenience. The [facility] Rheumatology  will call you to coordinate your care as prescribed by your provider. A representative will call you within 1 business day to help schedule your appointment, or you may contact the  Representative at [phone].    Ultrasound-guided bilateral knee cortisone injections were performed today without complication. Post-injection instructions provided below.    Physical therapy for knee strengthening and stabilization, hip/core strengthening and biomechanic assessment. Please stop by the  or call [phone] to schedule your appointment with [facility].    Heat and compression can be very helpful for arthritis. Ice can be used 10-15 minutes 3-4 times per day as needed for arthritis flares.     Turmeric with black pepper is an herbal anti-inflammatory supplement that has been found to be equally effective as Ibuprofen for treatment of knee arthritis pain and inflammation. We recommend the brand Pure Encapsulations and the products is called CurcumaSorb 180. Pure Encapsulations is of high quality and free of additives/allergens. For an anti-inflammatory dose, please take 2 tablets 1-2 times per day between meals (ie between breakfast-lunch and/or between lunch-dinner). This brand is available at [employer] or [employer].    Please  schedule a follow up appointment to see me in 6-8 weeks, or sooner as needed for persistence or worsening of pain. Call direct clinic number ([phone]) at any time with questions or concerns.    Melinda Gilliam MD, [facility]    [facility] Injection Discharge Instructions      You may shower, however avoid swimming, tub baths or hot tubs for 24 hours following your procedure    You may have a mild to moderate increase in pain for several days following the injection.    It may take up to 14 days for the steroid medication to start working although you may feel the effect as early as a few days after the procedure.    You may use ice packs for 10-15 minutes, 3 to 4 times a day at the injection site for comfort    You may use anti-inflammatory medications (such as Ibuprofen or Aleve or Advil) or Tylenol for pain control if necessary    If you were fasting, you may resume your normal diet and medications after the procedure    If you have diabetes, check your blood sugar more frequently than usual as your blood sugar may be higher than normal for 10-14 days following a steroid injection. Contact your doctor who manages your diabetes if your blood sugar is higher than usual      If you experience any of the following, call [facility] @ [phone]  -Fever over 100 degree F  -Swelling, bleeding, redness, drainage, warmth at the injection site  -New or worsening pain    -----    SUBJECTIVE  Jennifer Stinson is a/an 71 year old female who is seen in consultation at the request of  Dinah Molina N.P. for evaluation of bilateral knee pain. The patient is seen with their son.    Onset: 3 month(s) ago. Reports insidious onset without acute precipitating event.  Location of Pain: bilateral knees. Also notes pain and swelling of multiple other joints most significant in the knuckles (MCP joints) of both of the hands and the base of the thumbs. Currently, the knee  pain is the most significant.  Rating of Pain at worst: 10/10  Rating of Pain Currently: 10/10  Worsened by: walking, standing, transitioning from sit to stand, pushing  Better with: topical cream, massage provides a little relief  Treatments tried: rest/activity avoidance, heat, Tylenol, previous imaging (xray 9/16/21 - bilateral knees and hands) and topical creams  Associated symptoms: swelling in knees and hands/wrists  Orthopedic history: YES - patient also complains of left sided back (spine) pain, neck pain, bilateral elbow pain and bilateral shoulder pain  Relevant surgical history: NO  Social history: social history: retired    Past Medical History:   Diagnosis Date     GERD (gastroesophageal reflux disease)      Hyperlipidemia LDL goal < 130      Hypertension      Hypothyroidism      Stroke (H)      Social History     Socioeconomic History     Marital status:      Spouse name: Not on file     Number of children: Not on file     Years of education: Not on file     Highest education level: Not on file   Occupational History     Not on file   Tobacco Use     Smoking status: Never Smoker     Smokeless tobacco: Never Used   Vaping Use     Vaping Use: Never used   Substance and Sexual Activity     Alcohol use: No     Drug use: No     Sexual activity: Not Currently   Other Topics Concern     Parent/sibling w/ CABG, MI or angioplasty before 65F 55M? Not Asked   Social History Narrative    ** Merged History Encounter **          Social Determinants of Health     Financial Resource Strain:      Difficulty of Paying Living Expenses:    Food Insecurity:      Worried About Running Out of Food in the Last Year:      Ran Out of Food in the Last Year:    Transportation Needs:      Lack of Transportation (Medical):      Lack of Transportation (Non-Medical):    Physical Activity:      Days of Exercise per Week:      Minutes of Exercise per Session:    Stress:      Feeling of Stress :    Social Connections:       "Frequency of Communication with Friends and Family:      Frequency of Social Gatherings with Friends and Family:      Attends Pentecostalism Services:      Active Member of Clubs or Organizations:      Attends Club or Organization Meetings:      Marital Status:    Intimate Partner Violence:      Fear of Current or Ex-Partner:      Emotionally Abused:      Physically Abused:      Sexually Abused:          Patient's past medical, surgical, social, and family histories were reviewed today and no changes are noted.    REVIEW OF SYSTEMS:  10 point ROS is negative other than symptoms noted above in HPI, Past Medical History or as stated below  Constitutional: NEGATIVE for fever, chills, change in weight  Skin: NEGATIVE for worrisome rashes, moles or lesions  GI/: NEGATIVE for bowel or bladder changes  Neuro: NEGATIVE for weakness, dizziness or paresthesias    OBJECTIVE:  BP (!) 140/84   Ht 1.676 m (5' 6\")   Wt 85.3 kg (188 lb)   LMP  (LMP Unknown)   BMI 30.34 kg/m     General: healthy, alert and in no distress  HEENT: no scleral icterus or conjunctival erythema  Skin: no suspicious lesions or rash. No jaundice.  CV: no pedal edema  Resp: normal respiratory effort without conversational dyspnea   Psych: normal mood and affect  Gait: normal steady gait with appropriate coordination and balance  Neuro: Normal light sensory exam of lower extremity  MSK:  BILATERAL KNEE  Inspection:    normal alignment  Palpation:    Tender about the medial patellar facet, lateral joint line, medial joint line and popliteal region. Remainder of bony and ligamentous landmarks are nontender.    Small effusion is present    Patellofemoral crepitus is Present  Range of Motion:     00 extension to 1000 flexion  Strength:    Quadriceps 5-/5, hamstrings 5-/5 and gluteus medius 4+/5    Extensor mechanism intact  Special Tests:    Positive: Patellar grind, painful Char's    Negative: MCL/valgus stress (0 & 30 deg), LCL/varus stress (0 & 30 deg), " Lachman's, anterior drawer, posterior drawer    Independent visualization of the below image:  No results found for this or any previous visit (from the past 24 hour(s)).    Large Joint Injection/Arthocentesis: bilateral knee    Date/Time: 11/5/2021 3:23 PM  Performed by: Melinda Salcedo MD  Authorized by: Melinda Salcedo MD     Indications:  Pain and osteoarthritis  Needle Size:  25 G  Guidance: ultrasound    Approach:  Anterolateral  Location:  Knee  Laterality:  Bilateral      Medications (Right):  40 mg triamcinolone 40 MG/ML; 4 mL lidocaine 1 %  Medications (Left):  40 mg triamcinolone 40 MG/ML; 4 mL lidocaine 1 %  Outcome:  Tolerated well, no immediate complications  Procedure discussed: discussed risks, benefits, and alternatives    Consent Given by:  Patient  Timeout: timeout called immediately prior to procedure    Prep: patient was prepped and draped in usual sterile fashion          Melinda Gilliam MD, CAM  [facility]

## 2021-11-05 NOTE — PATIENT INSTRUCTIONS
1. Rheumatoid arthritis involving multiple sites with positive rheumatoid factor (H)    2. Primary osteoarthritis of both knees      Jennifer Stinson is a 71 year old female presenting for evaluation of bilateral knee pain as well as diffuse joint pain and swelling. History, examination, laboratory and imaging findings were reviewed today, consistent with rheumatoid arthritis with advanced tricompartmental degenerative joint disease of both knees. We discussed management options and plan to proceed with the following:    Referral to Rheumatology at your earliest convenience. [facility]  will call you to coordinate your care as prescribed by your provider. A representative will call you within 1 business day to help schedule your appointment, or you may contact the  Representative at [phone].    Ultrasound-guided bilateral knee cortisone injections were performed today without complication. Post-injection instructions provided below.    Physical therapy for knee strengthening and stabilization, hip/core strengthening and biomechanic assessment. Please stop by the  or call [phone] to schedule your appointment with [facility].    Heat and compression can be very helpful for arthritis. Ice can be used 10-15 minutes 3-4 times per day as needed for arthritis flares.     Turmeric with black pepper is an herbal anti-inflammatory supplement that has been found to be equally effective as Ibuprofen for treatment of knee arthritis pain and inflammation. We recommend the brand Pure Encapsulations and the products is called CurcumaSorb 180. Pure Encapsulations is of high quality and free of additives/allergens. For an anti-inflammatory dose, please take 2 tablets 1-2 times per day between meals (ie between breakfast-lunch and/or between lunch-dinner). This brand is available at [employer] or [employer].    Please schedule a follow up appointment  to see me in 6-8 weeks, or sooner as needed for persistence or worsening of pain. Call direct clinic number ([phone]) at any time with questions or concerns.    Melinda Gilliam MD, CAQSM  [facility]    [facility] [place]charge Instructions      You may shower, however avoid swimming, tub baths or hot tubs for 24 hours following your procedure    You may have a mild to moderate increase in pain for several days following the injection.    It may take up to 14 days for the steroid medication to start working although you may feel the effect as early as a few days after the procedure.    You may use ice packs for 10-15 minutes, 3 to 4 times a day at the injection site for comfort    You may use anti-inflammatory medications (such as Ibuprofen or Aleve or Advil) or Tylenol for pain control if necessary    If you were fasting, you may resume your normal diet and medications after the procedure    If you have diabetes, check your blood sugar more frequently than usual as your blood sugar may be higher than normal for 10-14 days following a steroid injection. Contact your doctor who manages your diabetes if your blood sugar is higher than usual      If you experience any of the following, call [facility] @ [phone]  -Fever over 100 degree F  -Swelling, bleeding, redness, drainage, warmth at the injection site  -New or worsening pain

## 2021-11-23 ENCOUNTER — THERAPY VISIT (OUTPATIENT)
Dept: PHYSICAL THERAPY | Facility: CLINIC | Age: 71
End: 2021-11-23
Attending: STUDENT IN AN ORGANIZED HEALTH CARE EDUCATION/TRAINING PROGRAM
Payer: MEDICARE

## 2021-11-23 DIAGNOSIS — G89.29 CHRONIC PAIN OF BOTH KNEES: Primary | ICD-10-CM

## 2021-11-23 DIAGNOSIS — M05.79 RHEUMATOID ARTHRITIS INVOLVING MULTIPLE SITES WITH POSITIVE RHEUMATOID FACTOR (H): ICD-10-CM

## 2021-11-23 DIAGNOSIS — M25.561 CHRONIC PAIN OF BOTH KNEES: Primary | ICD-10-CM

## 2021-11-23 DIAGNOSIS — M25.562 CHRONIC PAIN OF BOTH KNEES: Primary | ICD-10-CM

## 2021-11-23 DIAGNOSIS — M17.0 PRIMARY OSTEOARTHRITIS OF BOTH KNEES: ICD-10-CM

## 2021-11-23 PROCEDURE — 97110 THERAPEUTIC EXERCISES: CPT | Mod: GP | Performed by: PHYSICAL THERAPIST

## 2021-11-23 PROCEDURE — 97161 PT EVAL LOW COMPLEX 20 MIN: CPT | Mod: GP | Performed by: PHYSICAL THERAPIST

## 2021-11-23 ASSESSMENT — ACTIVITIES OF DAILY LIVING (ADL)
KNEE_ACTIVITY_OF_DAILY_LIVING_SUM: 61
WALK: ACTIVITY IS NOT DIFFICULT
LIMPING: I DO NOT HAVE THE SYMPTOM
GIVING WAY, BUCKLING OR SHIFTING OF KNEE: I HAVE THE SYMPTOM BUT IT DOES NOT AFFECT MY ACTIVITY
GO DOWN STAIRS: ACTIVITY IS MINIMALLY DIFFICULT
AS_A_RESULT_OF_YOUR_KNEE_INJURY,_HOW_WOULD_YOU_RATE_YOUR_CURRENT_LEVEL_OF_DAILY_ACTIVITY?: NEARLY NORMAL
STIFFNESS: I DO NOT HAVE THE SYMPTOM
KNEE_ACTIVITY_OF_DAILY_LIVING_SCORE: 87.14
RISE FROM A CHAIR: ACTIVITY IS NOT DIFFICULT
WEAKNESS: I HAVE THE SYMPTOM BUT IT DOES NOT AFFECT MY ACTIVITY
RAW_SCORE: 61
GO UP STAIRS: ACTIVITY IS MINIMALLY DIFFICULT
SQUAT: ACTIVITY IS MINIMALLY DIFFICULT
KNEEL ON THE FRONT OF YOUR KNEE: ACTIVITY IS SOMEWHAT DIFFICULT
HOW_WOULD_YOU_RATE_THE_OVERALL_FUNCTION_OF_YOUR_KNEE_DURING_YOUR_USUAL_DAILY_ACTIVITIES?: NEARLY NORMAL
SWELLING: I DO NOT HAVE THE SYMPTOM
PAIN: I HAVE THE SYMPTOM BUT IT DOES NOT AFFECT MY ACTIVITY
HOW_WOULD_YOU_RATE_THE_CURRENT_FUNCTION_OF_YOUR_KNEE_DURING_YOUR_USUAL_DAILY_ACTIVITIES_ON_A_SCALE_FROM_0_TO_100_WITH_100_BEING_YOUR_LEVEL_OF_KNEE_FUNCTION_PRIOR_TO_YOUR_INJURY_AND_0_BEING_THE_INABILITY_TO_PERFORM_ANY_OF_YOUR_USUAL_DAILY_ACTIVITIES?: 40
SIT WITH YOUR KNEE BENT: ACTIVITY IS MINIMALLY DIFFICULT
STAND: ACTIVITY IS NOT DIFFICULT

## 2021-11-23 NOTE — PROGRESS NOTES
Physical Therapy Initial Evaluation  Subjective:  The history is provided by the patient and a relative. The history is limited by a language barrier. A  was used.   Therapist Generated HPI Evaluation  Problem details: Patient presents with bilateral chronic knee pain that began 3 months prior. She went in to see the MD and got cortisone injections 2 weeks prior. She likes to walk and walking 1 mile is painful. Pain after walking lasts 5-10 minutes and calms with rest.  She would like to exercise to get strong while she is feeling good from the cortisone injections. .         Type of problem:  Bilateral knees.    This is a chronic condition.  Condition occurred with:  Other reason.  Where condition occurred: for unknown reasons.  Patient reports pain:  Anterior and medial.  Pain is described as sharp and is intermittent.  Pain radiates to:  Thigh. Pain is the same all the time.  Since onset symptoms are gradually improving.  Associated symptoms:  Loss of motion/stiffness and loss of strength. Symptoms are exacerbated by activity, walking, ascending stairs and descending stairs  and relieved by rest (Good relief with Cortisone shots given by MD. ).    Previous treatment includes other. There was significant (Good response to Cortisone Injections. ) improvement following previous treatment.  Work activity restrictions: Does not work.   Barriers include:  None as reported by patient.    Patient Health History  Jennifer Stinson being seen for Physical Therapy Knee pain.     Problem began: 11/5/2021 (MD visit noted (Patient reports 3 months prior)).   Problem occurred: Chronic Knee pain   Pain is reported as 3/10 on pain scale.  General health as reported by patient is good.  Pertinent medical history includes: high blood pressure, stroke and osteoarthritis (Small Stroke years prior. No residual effects per patient. ).   Red flags:  None as reported by patient.         Current medications:  High blood  pressure medication and thyroid medication.    Current occupation is Student Retired .   Primary job tasks include:  Other.   Other job/home tasks details: reading/studying .                                  Objective:  System                                                Knee Evaluation:  ROM:    AROM      Extension:  Left: 0    Right:  0  Flexion: Left: 140    Right: 145    Pain: Pain at end range bilaterally     Strength:     Extension:  Left: 5-/5    Pain:++      Right: 5-/5    Pain:+  Flexion:  Left: 5-/5   Pain:      Right: 5-/5   Pain:    Quad Set Left:  Good    Pain: ++   Quad Set Right:  Good    Pain: +    Special Tests:   Left knee positive for the following special tests:  Patellar Tracking-Abduction Lateral  Right knee positive for the following tests:  Patellar Tracking-Abduction Lateral  Palpation:  Palpation of knee: Distal Quadriceps Tender to palpation bilaterally. VMO most tender.   Left knee tenderness present at:  Medial Joint Line; Lateral Joint Line and Patellar Tendon  Right knee tenderness present at:  Medial Joint Line; Lateral Joint Line and Patellar Tendon  Edema:  Edema of the knee: Infrapatellar swelling bilaterally     Mobility Testing:      Patellofemoral Medial:  Left: normal    Right: normal  Patellofemoral Lateral:  Left: normal    Right: normal  Patellofemoral Superior:  Left: hypomobile    Right: hypomobile  Patellofemoral Inferior:  Left: hypomobile    Right: hypomobile  Functional Testing:  : Sit to stand painful bilaterally in anterior knee.                      General Evaluation:                              Gait:    Significant findings:  Slow, purposeful, short steps. slight outoeing bilaterally.                                      ROS    Assessment/Plan:    Patient is a 71 year old female with both sides knee complaints.    Patient has the following significant findings with corresponding treatment plan.                Diagnosis 1:  Bilateral Knee Pain  Pain -  hot/cold  therapy, manual therapy, splint/taping/bracing/orthotics, self management, education and home program  Decreased ROM/flexibility - manual therapy, therapeutic exercise and home program  Decreased joint mobility - manual therapy, therapeutic exercise and home program  Decreased strength - therapeutic exercise, therapeutic activities and home program  Edema - cold therapy  Impaired gait - gait training and home program  Impaired muscle performance - neuro re-education and home program  Decreased function - therapeutic activities and home program    Therapy Evaluation Codes:   1) History comprised of:   Personal factors that impact the plan of care:      Language.    Comorbidity factors that impact the plan of care are:      High blood pressure and Osteoarthritis .     Medications impacting care: High blood pressure.  2) Examination of Body Systems comprised of:   Body structures and functions that impact the plan of care:      Knee.   Activity limitations that impact the plan of care are:      Bending, Driving, Sitting, Squatting/kneeling, Stairs, Standing and Walking.  3) Clinical presentation characteristics are:   Stable/Uncomplicated.  4) Decision-Making    Low complexity using standardized patient assessment instrument and/or measureable assessment of functional outcome.  Cumulative Therapy Evaluation is: Low complexity.    Previous and current functional limitations:  (See Goal Flow Sheet for this information)    Short term and Long term goals: (See Goal Flow Sheet for this information)     Communication ability:  Patient appears to be able to clearly communicate and understand verbal and written communication and follow directions correctly.  Treatment Explanation - The following has been discussed with the patient:   RX ordered/plan of care  Anticipated outcomes  Possible risks and side effects  This patient would benefit from PT intervention to resume normal activities.   Rehab potential is  good.    Frequency:  1 X week, once daily  Duration:  for 8 weeks  Discharge Plan:  Achieve all LTG.  Independent in home treatment program.  Reach maximal therapeutic benefit.    Please refer to the daily flowsheet for treatment today, total treatment time and time spent performing 1:1 timed codes.

## 2021-11-27 ENCOUNTER — HEALTH MAINTENANCE LETTER (OUTPATIENT)
Age: 71
End: 2021-11-27

## 2021-12-01 NOTE — LETTER
[address], [address]  [place], [place]  [address]                                            TEL:[phone]      Jennifer Stinson  [address] [place] [facility] [address]      August 27, 2021    Dear Jennifer,    We have you scheduled to see Dinah Molina on September 16th at 3:40 pm for a medication check and refills.   Our address is listed above along with our phone number if you have any further questions.  Thank you.         Sincerely,      Dinah Molina C.N.P.     Today you were seen in the Breast Multidisciplinary Cancer Clinic.    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  Estrogen 100% positive.     2) Recommendations for Treatment: Dr Freeman reviewed breast anatomy and the pathology of your breast cancer.  He reviewed the MRI and due to the extent of enhancement seen, he would recommend an additional biopsy to confirm the area of cancer prior to surgery if breast conservation is preferred. No biopsy would be needed prior to a mastectomy. After discussion and review, the surgical plan recommended is a skin sparing mastectomy with immediate reconstruction. Dr Freeman would also do a sentinel lymph node biopsy at the time of surgery.   Dr Oakley reviewed the use of medications after surgery to reduce the risk of a future recurrence.  This medication targets estrogen in your body so that it can not be used as a fuel source to reduce the risk of a future cancer, but your risk is very low. He will meet with you again after surgery to review pathology and final recommendations.   Dr Busch reviewed the use of radiation to reduce the risk of local recurrence in the breast.   Radiation would likely not be recommended following mastectomy.      We recommended the following be completed prior to initiation of  treatment:  1) Consult Dr Moody to discuss reconstruction options  2) Pre-op clearance with your primary care provider prior to surgery  3) Labs and EKG as ordered by Dr Freeman  4) Dr Freeman's  will call you to set up a covid test, surgery and post op appts      Today you were provided with the following  materials:  1) Be a Survivor and Breast 360 web resource card  2) Breast cancer pathology and treatment forms from Dr Freeman  3) [facility] Resource Folder with printed surgery and support references  4) STAAR surgery kit with Carbohydrate drink and Hibiclens surgical scrub with printed instructions      Our desire is to provide you timely and safe care.  If you have any questions or concerns about the plan or about what was discussed today, please do not hesitate to call me. Also, if you think it has been a while since you have heard from us about the scheduling of tests or procedures, PLEASE call.     Karin Foreman, RN BSN OCN    Breast Cancer Nurse Navigator  [phone]

## 2021-12-03 ENCOUNTER — THERAPY VISIT (OUTPATIENT)
Dept: PHYSICAL THERAPY | Facility: CLINIC | Age: 71
End: 2021-12-03
Payer: MEDICARE

## 2021-12-03 DIAGNOSIS — M25.561 CHRONIC PAIN OF BOTH KNEES: ICD-10-CM

## 2021-12-03 DIAGNOSIS — G89.29 CHRONIC PAIN OF BOTH KNEES: ICD-10-CM

## 2021-12-03 DIAGNOSIS — M25.562 CHRONIC PAIN OF BOTH KNEES: ICD-10-CM

## 2021-12-03 PROCEDURE — 97140 MANUAL THERAPY 1/> REGIONS: CPT | Mod: GP | Performed by: PHYSICAL THERAPIST

## 2021-12-03 PROCEDURE — 97110 THERAPEUTIC EXERCISES: CPT | Mod: GP | Performed by: PHYSICAL THERAPIST

## 2021-12-27 DIAGNOSIS — I10 ESSENTIAL HYPERTENSION WITH GOAL BLOOD PRESSURE LESS THAN 140/90: ICD-10-CM

## 2021-12-27 DIAGNOSIS — I10 ESSENTIAL HYPERTENSION: ICD-10-CM

## 2021-12-29 RX ORDER — METOPROLOL SUCCINATE 50 MG/1
TABLET, EXTENDED RELEASE ORAL
Qty: 90 TABLET | Refills: 0 | Status: SHIPPED | OUTPATIENT
Start: 2021-12-29 | End: 2022-04-27

## 2021-12-29 RX ORDER — AMLODIPINE BESYLATE 10 MG/1
TABLET ORAL
Qty: 90 TABLET | Refills: 0 | Status: SHIPPED | OUTPATIENT
Start: 2021-12-29 | End: 2022-04-14

## 2021-12-29 NOTE — TELEPHONE ENCOUNTER
Routing refill request to provider for review/approval because:  Blood pressure out of protocol range    Pending Prescriptions:                       Disp   Refills    metoprolol succinate ER (TOPROL-XL) 50 MG *90 tab*0        Sig: TAKE 1 TABLET(50 MG) BY MOUTH DAILY    amLODIPine (NORVASC) 10 MG tablet [Pharmac*90 tab*0        Sig: TAKE 1 TABLET(10 MG) BY MOUTH DAILY

## 2021-12-31 ENCOUNTER — THERAPY VISIT (OUTPATIENT)
Dept: PHYSICAL THERAPY | Facility: CLINIC | Age: 71
End: 2021-12-31
Payer: MEDICARE

## 2021-12-31 DIAGNOSIS — M25.561 CHRONIC PAIN OF BOTH KNEES: ICD-10-CM

## 2021-12-31 DIAGNOSIS — M25.562 CHRONIC PAIN OF BOTH KNEES: ICD-10-CM

## 2021-12-31 DIAGNOSIS — G89.29 CHRONIC PAIN OF BOTH KNEES: ICD-10-CM

## 2021-12-31 PROCEDURE — 97110 THERAPEUTIC EXERCISES: CPT | Mod: GP | Performed by: PHYSICAL THERAPIST

## 2021-12-31 PROCEDURE — 97140 MANUAL THERAPY 1/> REGIONS: CPT | Mod: GP | Performed by: PHYSICAL THERAPIST

## 2022-01-02 NOTE — PROGRESS NOTES
PROGRESS  REPORT    Progress reporting period is from 11/23/2021 to 12/31/2021.       SUBJECTIVE  Subjective: Patient had a death in the family and was out of town for several weeks.  Right knee is feeling really good.  Left knee is more painful.  Patient has been massaging the knee up to 3 times a day which feels helpful.       Current Pain level: 0/10 (at rest. ).     Initial Pain level: 3/10.   Changes in function:  Yes, R knee is feeling really good, however left knee is more painful.  Patient is walking with less pain, however has not achieved the short term goal listed yet.   Adverse reaction to treatment or activity: None    OBJECTIVE  Palpation: Trigger points in B VL and RF.   AROM/PROM: WNL however pain with end ranges in both flexion and extension bilaterally.   Difficulty controlling end range knee extension. Functional Activity: Sit-to-stand: knees translate anterior with anterior knee pain in left.      ASSESSMENT/PLAN  Updated problem list and treatment plan: Diagnosis 1:  B knee Pain   Pain -  hot/cold therapy, manual therapy, splint/taping/bracing/orthotics, self management, education and home program  Decreased joint mobility - manual therapy, therapeutic exercise and home program  Decreased strength - therapeutic exercise, therapeutic activities and home program  Impaired muscle performance - neuro re-education and home program  Decreased function - therapeutic activities and home program  STG/LTGs have been met or progress has been made towards goals:  Yes, R knee is feeling really good, however left knee is more painful.  Patient is walking with less pain, however has not achieved the short term goal listed yet.   Assessment of Progress: The patient's condition is improving.  Self Management Plans:  Patient has been instructed in a home treatment program.  I have re-evaluated this patient and find that the nature, scope, duration and intensity of the therapy is appropriate for the medical  condition of the patient.  Jennifer continues to require the following intervention to meet STG and LTG's:  PT    Recommendations:  This patient would benefit from continued therapy.     Frequency:  1 X week, once daily  Duration:  for 8 weeks    Please refer to the daily flowsheet for treatment today, total treatment time and time spent performing 1:1 timed codes.

## 2022-01-07 ENCOUNTER — THERAPY VISIT (OUTPATIENT)
Dept: PHYSICAL THERAPY | Facility: CLINIC | Age: 72
End: 2022-01-07
Payer: MEDICARE

## 2022-01-07 DIAGNOSIS — G89.29 CHRONIC PAIN OF BOTH KNEES: ICD-10-CM

## 2022-01-07 DIAGNOSIS — M25.562 CHRONIC PAIN OF BOTH KNEES: ICD-10-CM

## 2022-01-07 DIAGNOSIS — M25.561 CHRONIC PAIN OF BOTH KNEES: ICD-10-CM

## 2022-01-07 PROCEDURE — 97110 THERAPEUTIC EXERCISES: CPT | Mod: GP | Performed by: PHYSICAL THERAPIST

## 2022-01-07 PROCEDURE — 97140 MANUAL THERAPY 1/> REGIONS: CPT | Mod: GP | Performed by: PHYSICAL THERAPIST

## 2022-02-17 PROBLEM — Z76.89 HEALTH CARE HOME: Status: RESOLVED | Noted: 2018-09-12 | Resolved: 2020-05-15

## 2022-03-08 PROBLEM — M25.562 BILATERAL KNEE PAIN: Status: RESOLVED | Noted: 2021-11-23 | Resolved: 2022-03-08

## 2022-03-08 PROBLEM — M25.561 BILATERAL KNEE PAIN: Status: RESOLVED | Noted: 2021-11-23 | Resolved: 2022-03-08

## 2022-03-08 NOTE — PROGRESS NOTES
Patient did not return for further treatment and no additional progress was noted.  Please refer to the progress note and goal flowsheet completed on 12/31/21 for discharge information.

## 2022-03-12 DIAGNOSIS — K21.9 GASTROESOPHAGEAL REFLUX DISEASE WITHOUT ESOPHAGITIS: ICD-10-CM

## 2022-03-13 DIAGNOSIS — I10 ESSENTIAL HYPERTENSION: ICD-10-CM

## 2022-03-14 NOTE — TELEPHONE ENCOUNTER
Pending Prescriptions:                       Disp   Refills    omeprazole (PRILOSEC) 20 MG DR capsule [P*90 cap*1            Sig: TAKE 1 CAPSULE(20MG) BY MOUTH ONCE DAILY 30-60           MINUTES BEFORE A MEAL      Prescription approved per [facility] Refill Protocol.

## 2022-03-15 NOTE — TELEPHONE ENCOUNTER
Routing refill request to provider for review/approval because:  A break in medication  Last filled for 90 day in September    BP Readings from Last 3 Encounters:   11/05/21 (!) 140/84   09/16/21 (!) 142/78   08/06/18 132/74      Alanna Latif RN

## 2022-03-16 RX ORDER — LISINOPRIL 40 MG/1
TABLET ORAL
Qty: 90 TABLET | Refills: 0 | Status: SHIPPED | OUTPATIENT
Start: 2022-03-16 | End: 2022-08-27

## 2022-04-17 DIAGNOSIS — I10 ESSENTIAL HYPERTENSION WITH GOAL BLOOD PRESSURE LESS THAN 140/90: ICD-10-CM

## 2022-04-19 RX ORDER — AMLODIPINE BESYLATE 10 MG/1
TABLET ORAL
Qty: 90 TABLET | Refills: 3 | Status: SHIPPED | OUTPATIENT
Start: 2022-04-19 | End: 2023-05-02

## 2022-04-19 NOTE — TELEPHONE ENCOUNTER
Call placed to patient with [place]  ID [id number]. Mobile number does not have voicemail. Spoke with patient. Scheduled appointment.    Appointments in Next Year    Apr 27, 2022  1:00 PM  (Arrive by 12:40 PM)  Provider Visit with Dinah Molina NP  [facility] [phone]          Can we fill medication for 90 days?    Thank you.    Amlodipine 10 mg  Routing refill request to provider for review/approval because:  Patient requesting 90 day Rx         Problem: Patient Care Overview  Goal: Plan of Care Review  Aerosol Q 4 NC 6 LPM

## 2022-04-27 ENCOUNTER — OFFICE VISIT (OUTPATIENT)
Dept: INTERNAL MEDICINE | Facility: CLINIC | Age: 72
End: 2022-04-27
Payer: MEDICARE

## 2022-04-27 VITALS
OXYGEN SATURATION: 99 % | TEMPERATURE: 98.1 F | DIASTOLIC BLOOD PRESSURE: 76 MMHG | HEART RATE: 80 BPM | SYSTOLIC BLOOD PRESSURE: 126 MMHG | RESPIRATION RATE: 16 BRPM | WEIGHT: 186.4 LBS | HEIGHT: 66 IN | BODY MASS INDEX: 29.96 KG/M2

## 2022-04-27 DIAGNOSIS — I10 ESSENTIAL HYPERTENSION: ICD-10-CM

## 2022-04-27 PROCEDURE — 99213 OFFICE O/P EST LOW 20 MIN: CPT | Performed by: NURSE PRACTITIONER

## 2022-04-27 RX ORDER — METOPROLOL SUCCINATE 50 MG/1
50 TABLET, EXTENDED RELEASE ORAL DAILY
Qty: 90 TABLET | Refills: 3 | Status: SHIPPED | OUTPATIENT
Start: 2022-04-27 | End: 2023-05-02

## 2022-04-27 NOTE — PROGRESS NOTES
"  Assessment & Plan     Essential hypertension    - metoprolol succinate ER (TOPROL-XL) 50 MG 24 hr tablet; Take 1 tablet (50 mg) by mouth daily    Continue all current meds, call for RF prn  F/u 3 months, bring home monitor with              BMI:   Estimated body mass index is 30.09 kg/m  as calculated from the following:    Height as of this encounter: 1.676 m (5' 6\").    Weight as of this encounter: 84.6 kg (186 lb 6.4 oz).         Dinah Molina NP  [facility] VENUS Rodriguez is a 72 year old who presents for the following health issues  accompanied by her granddaughter. Both patient and granddaughter decline interpretor..    HPI       ED/UC Followup:    Facility:  [facility]  Date of visit: 4-  Reason for visit: high BP  Current Status: Gets some BPs at home higher than 140/90.  Had been off meds x 3 days prior to UR. Now back on all and feeling fine.         Review of Systems   Constitutional, HEENT, cardiovascular, pulmonary, gi and gu systems are negative, except as otherwise noted.      Objective    /76 (BP Location: Right arm, Patient Position: Chair, Cuff Size: Adult Large)   Pulse 80   Temp 98.1  F (36.7  C) (Oral)   Resp 16   Ht 1.676 m (5' 6\")   Wt 84.6 kg (186 lb 6.4 oz)   LMP  (LMP Unknown)   SpO2 99%   Breastfeeding No   BMI 30.09 kg/m      Physical Exam   GENERAL: healthy, alert and no distress  EYES: Eyes grossly normal to inspection, PERRL and conjunctivae and sclerae normal  RESP: lungs clear to auscultation - no rales, rhonchi or wheezes  CV: regular rate and rhythm, normal S1 S2, no S3 or S4, no murmur, click or rub, no peripheral edema and peripheral pulses strong  PSYCH: mentation appears normal, affect normal/bright                "

## 2022-08-25 DIAGNOSIS — I10 ESSENTIAL HYPERTENSION: ICD-10-CM

## 2022-08-27 RX ORDER — LISINOPRIL 40 MG/1
TABLET ORAL
Qty: 90 TABLET | Refills: 0 | Status: SHIPPED | OUTPATIENT
Start: 2022-08-27 | End: 2022-10-12

## 2022-09-03 ENCOUNTER — HEALTH MAINTENANCE LETTER (OUTPATIENT)
Age: 72
End: 2022-09-03

## 2022-09-11 ENCOUNTER — APPOINTMENT (OUTPATIENT)
Dept: GENERAL RADIOLOGY | Facility: CLINIC | Age: 72
End: 2022-09-11
Attending: EMERGENCY MEDICINE
Payer: MEDICARE

## 2022-09-11 ENCOUNTER — HOSPITAL ENCOUNTER (EMERGENCY)
Facility: CLINIC | Age: 72
Discharge: HOME | End: 2022-09-11
Attending: EMERGENCY MEDICINE | Admitting: EMERGENCY MEDICINE
Payer: MEDICARE

## 2022-09-11 VITALS
OXYGEN SATURATION: 97 % | HEART RATE: 65 BPM | TEMPERATURE: 97.9 F | SYSTOLIC BLOOD PRESSURE: 146 MMHG | RESPIRATION RATE: 13 BRPM | DIASTOLIC BLOOD PRESSURE: 89 MMHG

## 2022-09-11 DIAGNOSIS — M25.512 ACUTE PAIN OF LEFT SHOULDER: ICD-10-CM

## 2022-09-11 DIAGNOSIS — M79.622 PAIN OF LEFT UPPER ARM: ICD-10-CM

## 2022-09-11 LAB
ANION GAP SERPL CALCULATED.3IONS-SCNC: 4 MMOL/L (ref 3–14)
ATRIAL RATE - MUSE: 67 BPM
BASOPHILS # BLD AUTO: 0 10E3/UL (ref 0–0.2)
BASOPHILS NFR BLD AUTO: 0 %
BUN SERPL-MCNC: 13 MG/DL (ref 7–30)
CALCIUM SERPL-MCNC: 8.5 MG/DL (ref 8.5–10.1)
CHLORIDE BLD-SCNC: 103 MMOL/L (ref 94–109)
CO2 SERPL-SCNC: 31 MMOL/L (ref 20–32)
CREAT SERPL-MCNC: 0.51 MG/DL (ref 0.52–1.04)
DIASTOLIC BLOOD PRESSURE - MUSE: NORMAL MMHG
EOSINOPHIL # BLD AUTO: 0.2 10E3/UL (ref 0–0.7)
EOSINOPHIL NFR BLD AUTO: 3 %
ERYTHROCYTE [DISTWIDTH] IN BLOOD BY AUTOMATED COUNT: 14.2 % (ref 10–15)
GFR SERPL CREATININE-BSD FRML MDRD: >90 ML/MIN/1.73M2
GLUCOSE BLD-MCNC: 131 MG/DL (ref 70–99)
HCT VFR BLD AUTO: 44.5 % (ref 35–47)
HGB BLD-MCNC: 14.5 G/DL (ref 11.7–15.7)
IMM GRANULOCYTES # BLD: 0 10E3/UL
IMM GRANULOCYTES NFR BLD: 1 %
INTERPRETATION ECG - MUSE: NORMAL
LYMPHOCYTES # BLD AUTO: 1.9 10E3/UL (ref 0.8–5.3)
LYMPHOCYTES NFR BLD AUTO: 29 %
MCH RBC QN AUTO: 28.9 PG (ref 26.5–33)
MCHC RBC AUTO-ENTMCNC: 32.6 G/DL (ref 31.5–36.5)
MCV RBC AUTO: 89 FL (ref 78–100)
MONOCYTES # BLD AUTO: 0.9 10E3/UL (ref 0–1.3)
MONOCYTES NFR BLD AUTO: 14 %
NEUTROPHILS # BLD AUTO: 3.4 10E3/UL (ref 1.6–8.3)
NEUTROPHILS NFR BLD AUTO: 53 %
NRBC # BLD AUTO: 0 10E3/UL
NRBC BLD AUTO-RTO: 0 /100
P AXIS - MUSE: 46 DEGREES
PLATELET # BLD AUTO: 251 10E3/UL (ref 150–450)
POTASSIUM BLD-SCNC: 3.5 MMOL/L (ref 3.4–5.3)
PR INTERVAL - MUSE: 168 MS
QRS DURATION - MUSE: 82 MS
QT - MUSE: 418 MS
QTC - MUSE: 441 MS
R AXIS - MUSE: 4 DEGREES
RBC # BLD AUTO: 5.02 10E6/UL (ref 3.8–5.2)
SODIUM SERPL-SCNC: 138 MMOL/L (ref 133–144)
SYSTOLIC BLOOD PRESSURE - MUSE: NORMAL MMHG
T AXIS - MUSE: 28 DEGREES
TROPONIN I SERPL HS-MCNC: <3 NG/L
VENTRICULAR RATE- MUSE: 67 BPM
WBC # BLD AUTO: 6.4 10E3/UL (ref 4–11)

## 2022-09-11 PROCEDURE — 99285 EMERGENCY DEPT VISIT HI MDM: CPT | Mod: 25

## 2022-09-11 PROCEDURE — 73030 X-RAY EXAM OF SHOULDER: CPT | Mod: LT

## 2022-09-11 PROCEDURE — 96375 TX/PRO/DX INJ NEW DRUG ADDON: CPT

## 2022-09-11 PROCEDURE — 93005 ELECTROCARDIOGRAM TRACING: CPT | Mod: RTG

## 2022-09-11 PROCEDURE — 250N000013 HC RX MED GY IP 250 OP 250 PS 637: Performed by: EMERGENCY MEDICINE

## 2022-09-11 PROCEDURE — 71046 X-RAY EXAM CHEST 2 VIEWS: CPT

## 2022-09-11 PROCEDURE — 36415 COLL VENOUS BLD VENIPUNCTURE: CPT | Performed by: EMERGENCY MEDICINE

## 2022-09-11 PROCEDURE — 73060 X-RAY EXAM OF HUMERUS: CPT | Mod: LT

## 2022-09-11 PROCEDURE — 85025 COMPLETE CBC W/AUTO DIFF WBC: CPT | Performed by: EMERGENCY MEDICINE

## 2022-09-11 PROCEDURE — 96374 THER/PROPH/DIAG INJ IV PUSH: CPT

## 2022-09-11 PROCEDURE — 84484 ASSAY OF TROPONIN QUANT: CPT | Performed by: EMERGENCY MEDICINE

## 2022-09-11 PROCEDURE — 82435 ASSAY OF BLOOD CHLORIDE: CPT | Performed by: EMERGENCY MEDICINE

## 2022-09-11 PROCEDURE — 250N000011 HC RX IP 250 OP 636: Performed by: EMERGENCY MEDICINE

## 2022-09-11 RX ORDER — LIDOCAINE 4 G/G
1 PATCH TOPICAL ONCE
Status: DISCONTINUED | OUTPATIENT
Start: 2022-09-11 | End: 2022-09-11 | Stop reason: HOSPADM

## 2022-09-11 RX ORDER — HYDROMORPHONE HCL IN WATER/PF 6 MG/30 ML
0.2 PATIENT CONTROLLED ANALGESIA SYRINGE INTRAVENOUS
Status: COMPLETED | OUTPATIENT
Start: 2022-09-11 | End: 2022-09-11

## 2022-09-11 RX ORDER — KETOROLAC TROMETHAMINE 15 MG/ML
15 INJECTION, SOLUTION INTRAMUSCULAR; INTRAVENOUS ONCE
Status: COMPLETED | OUTPATIENT
Start: 2022-09-11 | End: 2022-09-11

## 2022-09-11 RX ORDER — CYCLOBENZAPRINE HCL 10 MG
5 TABLET ORAL 3 TIMES DAILY PRN
Qty: 15 TABLET | Refills: 0 | Status: SHIPPED | OUTPATIENT
Start: 2022-09-11 | End: 2022-09-17

## 2022-09-11 RX ORDER — LIDOCAINE 4 G/G
1 PATCH TOPICAL EVERY 12 HOURS PRN
Qty: 15 PATCH | Refills: 0 | Status: SHIPPED | OUTPATIENT
Start: 2022-09-11 | End: 2023-11-08

## 2022-09-11 RX ADMIN — KETOROLAC TROMETHAMINE 15 MG: 15 INJECTION, SOLUTION INTRAMUSCULAR; INTRAVENOUS at 04:50

## 2022-09-11 RX ADMIN — LIDOCAINE 1 PATCH: 560 PATCH PERCUTANEOUS; TOPICAL; TRANSDERMAL at 03:38

## 2022-09-11 RX ADMIN — HYDROMORPHONE HYDROCHLORIDE 0.2 MG: 0.2 INJECTION, SOLUTION INTRAMUSCULAR; INTRAVENOUS; SUBCUTANEOUS at 03:35

## 2022-09-11 ASSESSMENT — ACTIVITIES OF DAILY LIVING (ADL)
ADLS_ACUITY_SCORE: 37

## 2022-09-11 NOTE — ED TRIAGE NOTES
BIBA from home. Generalized weakness starting several days ago and left arm pain starting at 1700 last night. HX htn, CVA and DM2. .

## 2022-09-11 NOTE — DISCHARGE INSTRUCTIONS
Follow up with primary care provider if still having pain  Ibuprofen 400 mg every 6 hours as needed  Tylenol up to 3000 mg per day  Flexeril for muscle relaxation  Keep lidocaine patch on for 12 hours then take off for 12 hours then can put a new patch on  Can also try heat or ice but not directly on patch  Watch for redness, fever, swelling, numbness, color change of hand

## 2022-09-11 NOTE — ED NOTES
Bed: ED03  Expected date:   Expected time:   Means of arrival:   Comments:  [facility] 72F left arm pain

## 2022-09-16 ENCOUNTER — OFFICE VISIT (OUTPATIENT)
Dept: FAMILY MEDICINE | Facility: CLINIC | Age: 72
End: 2022-09-16
Payer: MEDICARE

## 2022-09-16 VITALS
RESPIRATION RATE: 18 BRPM | HEART RATE: 96 BPM | TEMPERATURE: 98.9 F | DIASTOLIC BLOOD PRESSURE: 86 MMHG | OXYGEN SATURATION: 93 % | HEIGHT: 66 IN | WEIGHT: 190.5 LBS | SYSTOLIC BLOOD PRESSURE: 148 MMHG | BODY MASS INDEX: 30.62 KG/M2

## 2022-09-16 DIAGNOSIS — Z09 HOSPITAL DISCHARGE FOLLOW-UP: Primary | ICD-10-CM

## 2022-09-16 DIAGNOSIS — T78.1XXA FOOD SENSITIVITY WITH GASTROINTESTINAL SYMPTOMS: ICD-10-CM

## 2022-09-16 DIAGNOSIS — M19.019 SHOULDER ARTHRITIS: ICD-10-CM

## 2022-09-16 PROCEDURE — 99213 OFFICE O/P EST LOW 20 MIN: CPT | Performed by: PHYSICIAN ASSISTANT

## 2022-09-16 ASSESSMENT — PAIN SCALES - GENERAL: PAINLEVEL: EXTREME PAIN (9)

## 2022-09-16 NOTE — PROGRESS NOTES
Assessment and Plan:     (Z09) Hospital discharge follow-up  (primary encounter diagnosis)  Comment: Was seen in the ED on the 11th for shoulder pain, work-up for ACS negative, shoulder x-ray showed arthritis  Plan: See below    (M19.019) Shoulder arthritis  Comment: Atraumatic, bilateral pain with decreased range of motion, she has been underdosing Tylenol, we discussed taking up to 1000 mg 3 times a day, gentle range of motion exercises to avoid adhesive capsulitis, follow-up with Ortho, referral placed today  Plan: Orthopedic  Referral, Physical Therapy        Referral    (T78.1XXA) Food sensitivity with gastrointestinal symptoms  Comment:   Plan: Adult Allergy/Asthma Referral    Blanca Haley PA-C        Grayson Rodriguez is a 72 year old presenting for the following health issues:  Hospital F/U      HPI     ED/UC Followup:    Facility:  [facility]  Date of visit: 9/11/22  Reason for visit: Acute pain of left shoulder  Pain of left upper arm   Current Status: Unchanged    Mrs. Raya is here for ED follow-up.  She was seen in the ED on 9/11/2022 for left shoulder pain.  Work-up was negative for ACS including EKG and troponin.  X-ray did show degenerative changes.  She is now having pain in bilateral shoulders.    She has been using flexeril and lidocaine patches for pain which are not helping.  She has a lot of pain at bedtime which makes it hard to sleep.  She feels her appetite is decreased due to pain.  The pain is bilateral shoulders.  She describes the pain as a burning sensation.  She denies weakness.    She has intermittent neck pain.  She denies neck trauma.      She denies chest pain, sob, headache, focal weakness.    She has a lot of food sensitivities and would like to see an allergist for testing.  She denies history of anaphylaxis.    Review of Systems   See above      Objective    LMP  (LMP Unknown)      BP (!) 148/86   Pulse 96   Temp 98.9  F  "(37.2  C) (Oral)   Resp 18   Ht 1.676 m (5' 6\")   Wt 86.4 kg (190 lb 8 oz)   LMP  (LMP Unknown)   SpO2 93%   BMI 30.75 kg/m        Physical Exam     GENERAL: healthy, alert and no distress  RESP: lungs clear to auscultation - no rales, no rhonchi, no wheezes  CV: regular rates and rhythm, normal S1 S2, no S3 or S4 and no murmur, no click or rub   MS: extremities- no gross deformities noted, no edema  NECK: no midline c spine tendernss  Bilateral shoulders with no skin changes, no swelling, no deformity and nontender, she does have significantly reduced range of motion especially with flexion,  strength is normal, elbow flexion and extension strength is normal, sensation intact, ulnar and radial pulses palpable, no swelling of the upper or forearms.            "

## 2022-09-16 NOTE — PATIENT INSTRUCTIONS
Take tylenol as needed for pain up to 1000mg three times daily, do not exceed 3000mg in 24 hour period    Gentle stretching as discussed today    Continue to monitor blood pressure once daily at home--write down and bring to next visit with NP Molina

## 2022-09-26 ENCOUNTER — TRANSFERRED RECORDS (OUTPATIENT)
Dept: INTERPRETER SERVICES | Facility: CLINIC | Age: 72
End: 2022-09-26

## 2022-10-11 DIAGNOSIS — I10 ESSENTIAL HYPERTENSION: ICD-10-CM

## 2022-10-11 DIAGNOSIS — R07.9 CHEST PAIN, UNSPECIFIED TYPE: ICD-10-CM

## 2022-10-11 NOTE — TELEPHONE ENCOUNTER
Pending Prescriptions:                       Disp   Refills    atorvastatin (LIPITOR) 40 MG tablet        90 tab*2        Sig: Take 1 tablet (40 mg) by mouth daily    lisinopril (ZESTRIL) 40 MG tablet          90 tab*0        Sig: TAKE 1 TABLET(40MG) BY MOUTH DAILY  Routing refill request to provider for review/approval because:  Fails protocol  Last ov 4/27/2022

## 2022-10-12 RX ORDER — ATORVASTATIN CALCIUM 40 MG/1
40 TABLET, FILM COATED ORAL DAILY
Qty: 90 TABLET | Refills: 2 | Status: SHIPPED | OUTPATIENT
Start: 2022-10-12 | End: 2023-10-27

## 2022-10-12 RX ORDER — LISINOPRIL 40 MG/1
TABLET ORAL
Qty: 90 TABLET | Refills: 0 | Status: SHIPPED | OUTPATIENT
Start: 2022-10-12 | End: 2023-01-18

## 2022-11-09 ENCOUNTER — PATIENT OUTREACH (OUTPATIENT)
Dept: GERIATRIC MEDICINE | Facility: CLINIC | Age: 72
End: 2022-11-09

## 2022-11-09 NOTE — LETTER
November 9, 2022    JACKELYN IRBY  [address] [place] [address]      Dear Jackelyn:    As a member of [facility] ([facility] you are provided a care coordinator. I will be your new care coordinator as of 11/1/2022. I will be calling you soon to see how you are doing and determine your needs.    If you have any questions, please feel free to call me at [phone]. If you reach my voice mail, please leave a message and your phone number. If you are hearing impaired, please call the [place] [employer] at 282 or [phone] ([email or web address] relay service).    I look forward to speaking with you soon.    Sincerely,        MATT Young  [phone]  [email or web address]          [facility]  [id number] DHS Approved ([id number])  [id number] (11/18)

## 2022-11-09 NOTE — PROGRESS NOTES
[facility] Care Coordination Contact    Member became effective with  [facility] on 11/1/2022 with [facility]  Previous Health Plan: [facility]+  Previous Care System: [facility]  Previous care coordinators name and number: MARY ANNE SHEA, [phone]  Waiver Type: EW  Last MMIS Entry: Date 9/1/22 and Type Pers Assmt  MMIS visit date (and type) if different from above: NA  Services Listed in MMIS:   37 F CASE MGMT 68 F PCA SUPER 20 F PERSONAL  54 F EMERG RSP 8 F HOMEMAKER  UTF received: No: Requested on 11/9/22 from previous CC     Winifred Gaspar  Care Management Specialist  [facility]  [phone]

## 2022-11-29 ENCOUNTER — PATIENT OUTREACH (OUTPATIENT)
Dept: GERIATRIC MEDICINE | Facility: CLINIC | Age: 72
End: 2022-11-29

## 2022-11-29 NOTE — PROGRESS NOTES
[facility] Care Coordination Contact    [facility] Change (Transfer)    Member is new enrollee to [facility] effective 11/1/2022 with [facility] health plan. Member transferred from [facility].    No home visit required because this care coordinator (CC) has received all required documentation from the previous CC.    Writer t/c to member, introduced self as member's new CC. Confirmed with member that the welcome letter with writer's name and contact information has been received.  Reviewed LTCC/Health Risk Assessment (HRA) and POC with member. No changes noted.  Transitional HRA completed. Care Plan Summary updated and reflects current services.  Required referral authorization information communicated to CMS: Yes    Writer reviewed the following with member:    ER visits: Yes -  [facility]  Hospitalizations: No  TCU stays: No  Significant health status changes: No significant health changes.   Falls/Injuries: No  ADL/IADL changes: No  Changes in services: No    Follow-Up Plan: Member informed of future contact, plan to f/u with member with at next regularly scheduled contact.  Contact information shared with member and family, encouraged member to call with any questions or concerns.    MATT Young  [facility]  [phone]

## 2022-12-06 DIAGNOSIS — E03.8 OTHER SPECIFIED HYPOTHYROIDISM: ICD-10-CM

## 2022-12-08 RX ORDER — LEVOTHYROXINE SODIUM 88 UG/1
88 TABLET ORAL DAILY
Qty: 90 TABLET | Refills: 0 | Status: SHIPPED | OUTPATIENT
Start: 2022-12-08 | End: 2023-01-18

## 2022-12-08 NOTE — TELEPHONE ENCOUNTER
Medication is being filled for 1 time refill only due to:  Patient needs labs for thyroid.     Please notify pt to schedule lab only appointment to check thyroid labs prior to next appointment.    Feroz Ozuna RN

## 2022-12-13 ENCOUNTER — LAB (OUTPATIENT)
Dept: LAB | Facility: CLINIC | Age: 72
End: 2022-12-13
Payer: MEDICARE

## 2022-12-13 DIAGNOSIS — E03.8 OTHER SPECIFIED HYPOTHYROIDISM: ICD-10-CM

## 2022-12-13 LAB — TSH SERPL DL<=0.005 MIU/L-ACNC: 3.52 UIU/ML (ref 0.3–4.2)

## 2022-12-13 PROCEDURE — 84443 ASSAY THYROID STIM HORMONE: CPT | Performed by: NURSE PRACTITIONER

## 2022-12-13 PROCEDURE — 36415 COLL VENOUS BLD VENIPUNCTURE: CPT | Performed by: NURSE PRACTITIONER

## 2022-12-15 ENCOUNTER — LAB (OUTPATIENT)
Dept: LAB | Facility: CLINIC | Age: 72
End: 2022-12-15
Payer: MEDICARE

## 2022-12-15 ENCOUNTER — OFFICE VISIT (OUTPATIENT)
Dept: ALLERGY | Facility: CLINIC | Age: 72
End: 2022-12-15
Attending: PHYSICIAN ASSISTANT
Payer: MEDICARE

## 2022-12-15 VITALS
SYSTOLIC BLOOD PRESSURE: 144 MMHG | DIASTOLIC BLOOD PRESSURE: 84 MMHG | OXYGEN SATURATION: 98 % | HEART RATE: 79 BPM | BODY MASS INDEX: 31.67 KG/M2 | WEIGHT: 196.2 LBS

## 2022-12-15 DIAGNOSIS — L29.9 ITCHING: ICD-10-CM

## 2022-12-15 DIAGNOSIS — T78.40XA ALLERGY, UNSPECIFIED, INITIAL ENCOUNTER: ICD-10-CM

## 2022-12-15 DIAGNOSIS — T78.1XXA FOOD SENSITIVITY WITH GASTROINTESTINAL SYMPTOMS: ICD-10-CM

## 2022-12-15 DIAGNOSIS — L29.9 ITCHING: Primary | ICD-10-CM

## 2022-12-15 PROCEDURE — 86003 ALLG SPEC IGE CRUDE XTRC EA: CPT | Mod: 90

## 2022-12-15 PROCEDURE — 86003 ALLG SPEC IGE CRUDE XTRC EA: CPT | Mod: 59

## 2022-12-15 PROCEDURE — 99000 SPECIMEN HANDLING OFFICE-LAB: CPT

## 2022-12-15 PROCEDURE — 99203 OFFICE O/P NEW LOW 30 MIN: CPT | Performed by: INTERNAL MEDICINE

## 2022-12-15 PROCEDURE — 36415 COLL VENOUS BLD VENIPUNCTURE: CPT

## 2022-12-15 ASSESSMENT — ENCOUNTER SYMPTOMS
MYALGIAS: 0
VOMITING: 0
RHINORRHEA: 0
FACIAL SWELLING: 0
EYE REDNESS: 0
COUGH: 0
CHEST TIGHTNESS: 0
CHILLS: 0
WHEEZING: 0
EYE DISCHARGE: 0
SHORTNESS OF BREATH: 0
SINUS PRESSURE: 0
ARTHRALGIAS: 0
FEVER: 0
JOINT SWELLING: 0
DIARRHEA: 0
EYE ITCHING: 0
ADENOPATHY: 0
ACTIVITY CHANGE: 0
NAUSEA: 0
HEADACHES: 0

## 2022-12-15 NOTE — PATIENT INSTRUCTIONS
Allergy Staff Appt Hours Shot Hours Location         Physician   Noe Alarcon MD      Support Staff   MARY Granda, RN   Ben BARRETO, GWENDOLYN FRENCH LPN          Mondays  Not available until January/2023 Wednesdays  Close    Tuesdays Thursdays and Fridays:  [place] 7-5                    [place]     Tuesdays: 7:40-3:20      Fridays: 7:40-4:20                [facility]  [address]  [place] [address]  Appt Line: [phone]    Pulmonary Function Scheduling:  [place]: [phone]         Questions about cost of your care?  For questions about your cost of your visit, procedure, lab or imaging contact: [employer] Price Line [phone] or visit: [email or web address]    Important Scheduling Information  Appointments for skin testing: Appointment will last approximately 45 minutes.  Please call the appointment line for your clinic to schedule.  Discontinue oral antihistamines 7 days prior to the appointment.  Discontinue nasal and ocular antihistamines 4 days prior to appointment.    Appointments for challenges (oral food challenge, penicillin testing, aspirin desensitization) If your provider instructs you to that this additional testing is indicated, it will need to be scheduled directly through the allergy department.  Please contact them via [employer] or by calling the clinic and asking to speak with the allergy team.  They will provide additional information and instructions for the appointment.  Discontinue oral antihistamines 7 days prior to the appointment.  Discontinue nasal and ocular antihistamines 4 days prior to the appointment.    Thank you for trusting us with your care. Please feel free to contact us with any questions or concerns you may have.

## 2022-12-15 NOTE — PROGRESS NOTES
"Jennifer Stinson was seen in the Allergy Clinic at [facility].    Jennifer Stinson is a 72 year old female being seen today at the request of Kyung Haley, Blanca Saleh PA-C [facility]  in consultation for Food sensitivity with gastrointestinal symptoms.    She has had years of problems with eggs.  Over the last 1 to 2 years she has had numerous foods that are causing increased \"inflammation\" such as black bean, green bean, lima bean.  Also has problems with spices such as chili or cayenne or black pepper.  Meat such as chicken will cause problems as well as eggs.  Oils she tends to avoid because of her cholesterol.    There is no hives of food associated with this.  She will have inflammation of her arms or legs with minor swelling.  She does receive cortisone shots for arthritis.    She also describes how her eyes hurt and her head hurts, she has abdominal pain and she will have itching.  This happens within 5 to 15 minutes after eating.  This has been getting worse over the last year.  Her diet has been quite restricted.  Even when avoiding all these foods sounds like she still will have some symptoms.    She is here with her daughter who is translating.    Past Medical History:   Diagnosis Date     GERD (gastroesophageal reflux disease)      Hyperlipidemia LDL goal < 130      Hypertension      Hypothyroidism      Stroke (H)      Family History   Problem Relation Age of Onset     Family History Negative Mother      Family History Negative Father      Family History Negative Maternal Grandmother      Family History Negative Maternal Grandfather      Family History Negative Paternal Grandmother      Family History Negative Paternal Grandfather      Diabetes No family hx of      Thyroid Disease No family hx of      Cancer No family hx of      Past Surgical History:   Procedure Laterality Date     CHOLECYSTECTOMY         ENVIRONMENTAL HISTORY:   Pets inside the house include " 2 cat(s).  Do you smoke cigarettes or other recreational drugs? No There is/are 0 smokers living in the house. The house does not have a damp basement.     SOCIAL HISTORY:   Jennifer is retired. She lives with her daughter.      Review of Systems   Constitutional: Negative for activity change, chills and fever.   HENT: Negative for congestion, dental problem, ear pain, facial swelling, nosebleeds, postnasal drip, rhinorrhea, sinus pressure and sneezing.    Eyes: Negative for discharge, redness and itching.   Respiratory: Negative for cough, chest tightness, shortness of breath and wheezing.    Cardiovascular: Negative for chest pain.   Gastrointestinal: Negative for diarrhea, nausea and vomiting.   Musculoskeletal: Negative for arthralgias, joint swelling and myalgias.   Skin: Negative for rash.   Neurological: Negative for headaches.   Hematological: Negative for adenopathy.   Psychiatric/Behavioral: Negative for behavioral problems and self-injury.   All other systems reviewed and are negative.        Current Outpatient Medications:      amLODIPine (NORVASC) 10 MG tablet, TAKE 1 TABLET(10 MG) BY MOUTH DAILY, Disp: 90 tablet, Rfl: 3     aspirin  MG EC tablet, Take 1 tablet (325 mg) by mouth daily, Disp: 60 tablet, Rfl: 0     atorvastatin (LIPITOR) 40 MG tablet, Take 1 tablet (40 mg) by mouth daily, Disp: 90 tablet, Rfl: 2     levothyroxine (SYNTHROID/LEVOTHROID) 88 MCG tablet, Take 1 tablet (88 mcg) by mouth daily, Disp: 90 tablet, Rfl: 0     Lidocaine (LIDOCARE) 4 % Patch, Place 1 patch onto the skin every 12 hours as needed for moderate pain To prevent lidocaine toxicity, patient should be patch free for 12 hrs daily., Disp: 15 patch, Rfl: 0     lisinopril (ZESTRIL) 40 MG tablet, TAKE 1 TABLET(40MG) BY MOUTH DAILY, Disp: 90 tablet, Rfl: 0     metoprolol succinate ER (TOPROL-XL) 50 MG 24 hr tablet, Take 1 tablet (50 mg) by mouth daily, Disp: 90 tablet, Rfl: 3     multiple vitamin  tablet TABS, Take 1 tablet by  mouth daily, Disp: , Rfl:      omeprazole (PRILOSEC) 20 MG DR capsule, TAKE 1 CAPSULE(20MG) BY MOUTH ONCE DAILY 30-60 MINUTES BEFORE A MEAL, Disp: 90 capsule, Rfl: 1    Current Facility-Administered Medications:      lidocaine 1 % injection 4 mL, 4 mL, , , Melinda Salcedo MD, 4 mL at 11/05/21 1523     lidocaine 1 % injection 4 mL, 4 mL, , , Melinda Salcedo MD, 4 mL at 11/05/21 1523     triamcinolone (KENALOG-40) injection 40 mg, 40 mg, , , Melinda Salcedo MD, 40 mg at 11/05/21 1523     triamcinolone (KENALOG-40) injection 40 mg, 40 mg, , , Melinda Salcedo MD, 40 mg at 11/05/21 1523  Allergies   Allergen Reactions     Eggs Or Egg-Derived Products [Chicken-Derived Products (Egg)]      No Clinical Screening - See Comments Swelling and Rash     Other reaction(s): Edema  All meat: poultry, beef, etc.  Reaction: swelling  Cinnamon and other spices  Reaction: face swelling, rash  Cinnamon and other spices  Reaction: face swelling, rash  All meat: poultry, beef, etc.  Reaction: swelling     Nuts Other (See Comments) and Rash     All nuts  Reaction: swelling, rash, headache     Peanut-Derived Rash     Other reaction(s): Headache  All nuts  Reaction: swelling, rash, headache         EXAM:   BP (!) 144/84   Pulse 79   Wt 89 kg (196 lb 3.2 oz)   LMP  (LMP Unknown)   SpO2 98%   BMI 31.67 kg/m      Physical Exam    Constitutional:       General: She is not in acute distress.     Appearance: Normal appearance. She is not ill-appearing.   HENT:      Head: Normocephalic and atraumatic.   Eyes:      General:         Right eye: No discharge.         Left eye: No discharge.   Pulmonary:      Effort: Pulmonary effort is normal.   Neurological:      General: No focal deficit present.      Mental Status: She is alert. Mental status is at baseline.   Psychiatric:         Mood and Affect: Mood normal.         Behavior: Behavior normal.       ASSESSMENT/PLAN:  Jennifer Stinson is a 72 year old  female seen today for concerns for food sensitivity.  Her daughter is helping translate.  They describe inflammation within 5 to 15 minutes after eating numerous foods.  We discussed how we are only able to assess IgE levels to these foods.  Since the symptoms happen quickly, although the symptoms are atypical, will assess by blood testing.  If normal may consider a referral to nutrition.    Blood testing was ordered.  We will be contacted with results.  Next steps will be determined based on those results.    Follow-up to be determined      Thank you for allowing me to participate in the care of Jennifer Stinson.      I spent 30 minutes on the date of the encounter doing chart review, history and exam, documentation and further coordination as noted above exclusive of separately reported interpretations    Noe Alarcon MD  Allergy/Immunology  [facility]

## 2022-12-15 NOTE — LETTER
"    12/15/2022         RE: Jennifer Stinson  [address] [address]        Dear Colleague,    Thank you for referring your patient, Jennifer Stinson, to the [facility]. Please see a copy of my visit note below.    Jennifer Stinson was seen in the Allergy Clinic at [facility].    Jennifer Stinson is a 72 year old female being seen today at the request of Blanca Edmond PA-C [facility]  in consultation for Food sensitivity with gastrointestinal symptoms.    She has had years of problems with eggs.  Over the last 1 to 2 years she has had numerous foods that are causing increased \"inflammation\" such as black bean, green bean, lima bean.  Also has problems with spices such as chili or cayenne or black pepper.  Meat such as chicken will cause problems as well as eggs.  Oils she tends to avoid because of her cholesterol.    There is no hives of food associated with this.  She will have inflammation of her arms or legs with minor swelling.  She does receive cortisone shots for arthritis.    She also describes how her eyes hurt and her head hurts, she has abdominal pain and she will have itching.  This happens within 5 to 15 minutes after eating.  This has been getting worse over the last year.  Her diet has been quite restricted.  Even when avoiding all these foods sounds like she still will have some symptoms.    She is here with her daughter who is translating.    Past Medical History:   Diagnosis Date     GERD (gastroesophageal reflux disease)      Hyperlipidemia LDL goal < 130      Hypertension      Hypothyroidism      Stroke (H)      Family History   Problem Relation Age of Onset     Family History Negative Mother      Family History Negative Father      Family History Negative Maternal Grandmother      Family History Negative Maternal Grandfather      Family History Negative Paternal Grandmother      Family History " Negative Paternal Grandfather      Diabetes No family hx of      Thyroid Disease No family hx of      Cancer No family hx of      Past Surgical History:   Procedure Laterality Date     CHOLECYSTECTOMY         ENVIRONMENTAL HISTORY:   Pets inside the house include 2 cat(s).  Do you smoke cigarettes or other recreational drugs? No There is/are 0 smokers living in the house. The house does not have a damp basement.     SOCIAL HISTORY:   Jennifer is retired. She lives with her daughter.      Review of Systems   Constitutional: Negative for activity change, chills and fever.   HENT: Negative for congestion, dental problem, ear pain, facial swelling, nosebleeds, postnasal drip, rhinorrhea, sinus pressure and sneezing.    Eyes: Negative for discharge, redness and itching.   Respiratory: Negative for cough, chest tightness, shortness of breath and wheezing.    Cardiovascular: Negative for chest pain.   Gastrointestinal: Negative for diarrhea, nausea and vomiting.   Musculoskeletal: Negative for arthralgias, joint swelling and myalgias.   Skin: Negative for rash.   Neurological: Negative for headaches.   Hematological: Negative for adenopathy.   Psychiatric/Behavioral: Negative for behavioral problems and self-injury.   All other systems reviewed and are negative.        Current Outpatient Medications:      amLODIPine (NORVASC) 10 MG tablet, TAKE 1 TABLET(10 MG) BY MOUTH DAILY, Disp: 90 tablet, Rfl: 3     aspirin  MG EC tablet, Take 1 tablet (325 mg) by mouth daily, Disp: 60 tablet, Rfl: 0     atorvastatin (LIPITOR) 40 MG tablet, Take 1 tablet (40 mg) by mouth daily, Disp: 90 tablet, Rfl: 2     levothyroxine (SYNTHROID/LEVOTHROID) 88 MCG tablet, Take 1 tablet (88 mcg) by mouth daily, Disp: 90 tablet, Rfl: 0     Lidocaine (LIDOCARE) 4 % Patch, Place 1 patch onto the skin every 12 hours as needed for moderate pain To prevent lidocaine toxicity, patient should be patch free for 12 hrs daily., Disp: 15 patch, Rfl: 0      lisinopril (ZESTRIL) 40 MG tablet, TAKE 1 TABLET(40MG) BY MOUTH DAILY, Disp: 90 tablet, Rfl: 0     metoprolol succinate ER (TOPROL-XL) 50 MG 24 hr tablet, Take 1 tablet (50 mg) by mouth daily, Disp: 90 tablet, Rfl: 3     multiple vitamin  tablet TABS, Take 1 tablet by mouth daily, Disp: , Rfl:      omeprazole (PRILOSEC) 20 MG DR capsule, TAKE 1 CAPSULE(20MG) BY MOUTH ONCE DAILY 30-60 MINUTES BEFORE A MEAL, Disp: 90 capsule, Rfl: 1    Current Facility-Administered Medications:      lidocaine 1 % injection 4 mL, 4 mL, , , Melinda Salcedo MD, 4 mL at 11/05/21 1523     lidocaine 1 % injection 4 mL, 4 mL, , , Melinda Salcedo MD, 4 mL at 11/05/21 1523     triamcinolone (KENALOG-40) injection 40 mg, 40 mg, , , Melinda Salcedo MD, 40 mg at 11/05/21 1523     triamcinolone (KENALOG-40) injection 40 mg, 40 mg, , , Melinda Salcedo MD, 40 mg at 11/05/21 1523  Allergies   Allergen Reactions     Eggs Or Egg-Derived Products [Chicken-Derived Products (Egg)]      No Clinical Screening - See Comments Swelling and Rash     Other reaction(s): Edema  All meat: poultry, beef, etc.  Reaction: swelling  Cinnamon and other spices  Reaction: face swelling, rash  Cinnamon and other spices  Reaction: face swelling, rash  All meat: poultry, beef, etc.  Reaction: swelling     Nuts Other (See Comments) and Rash     All nuts  Reaction: swelling, rash, headache     Peanut-Derived Rash     Other reaction(s): Headache  All nuts  Reaction: swelling, rash, headache         EXAM:   BP (!) 144/84   Pulse 79   Wt 89 kg (196 lb 3.2 oz)   LMP  (LMP Unknown)   SpO2 98%   BMI 31.67 kg/m      Physical Exam    Constitutional:       General: She is not in acute distress.     Appearance: Normal appearance. She is not ill-appearing.   HENT:      Head: Normocephalic and atraumatic.   Eyes:      General:         Right eye: No discharge.         Left eye: No discharge.   Pulmonary:      Effort: Pulmonary effort is normal.    Neurological:      General: No focal deficit present.      Mental Status: She is alert. Mental status is at baseline.   Psychiatric:         Mood and Affect: Mood normal.         Behavior: Behavior normal.       ASSESSMENT/PLAN:  Jennifer Stinson is a 72 year old female seen today for concerns for food sensitivity.  Her daughter is helping translate.  They describe inflammation within 5 to 15 minutes after eating numerous foods.  We discussed how we are only able to assess IgE levels to these foods.  Since the symptoms happen quickly, although the symptoms are atypical, will assess by blood testing.  If normal may consider a referral to nutrition.    Blood testing was ordered.  We will be contacted with results.  Next steps will be determined based on those results.    Follow-up to be determined      Thank you for allowing me to participate in the care of Jennifer Stinson.      I spent 30 minutes on the date of the encounter doing chart review, history and exam, documentation and further coordination as noted above exclusive of separately reported interpretations    Noe Alarcon MD  Allergy/Immunology  [facility]        Again, thank you for allowing me to participate in the care of your patient.        Sincerely,        Noe Alarcon MD

## 2022-12-16 LAB
Lab: NORMAL
Lab: NORMAL
PERFORMING LABORATORY: NORMAL
PERFORMING LABORATORY: NORMAL
SPECIMEN STATUS: NORMAL
SPECIMEN STATUS: NORMAL
TEST NAME: NORMAL
TEST NAME: NORMAL

## 2022-12-18 LAB
BLACK PEPPER IGE QN: <0.1 KU/L
DEPRECATED MISC ALLERGEN IGE RAST QL: NORMAL
RED KIDNEY BEAN IGE QN: <0.1 KU/L

## 2022-12-19 LAB
CHICKEN MEAT IGE QN: <0.1 KU(A)/L
EGG WHITE IGE QN: <0.1 KU(A)/L
GREEN BEAN IGE QN: <0.1 KU(A)/L
MAYO MISC RESULT: NORMAL
SOYBEAN IGE QN: <0.1 KU(A)/L
TUNA IGE QN: <0.1 KU(A)/L

## 2022-12-21 LAB — MAYO MISC RESULT: NORMAL

## 2023-01-15 ENCOUNTER — HEALTH MAINTENANCE LETTER (OUTPATIENT)
Age: 73
End: 2023-01-15

## 2023-01-18 ENCOUNTER — OFFICE VISIT (OUTPATIENT)
Dept: INTERNAL MEDICINE | Facility: CLINIC | Age: 73
End: 2023-01-18
Payer: MEDICARE

## 2023-01-18 VITALS
HEIGHT: 66 IN | SYSTOLIC BLOOD PRESSURE: 156 MMHG | HEART RATE: 107 BPM | OXYGEN SATURATION: 98 % | RESPIRATION RATE: 16 BRPM | TEMPERATURE: 98.5 F | DIASTOLIC BLOOD PRESSURE: 84 MMHG | WEIGHT: 198.6 LBS | BODY MASS INDEX: 31.92 KG/M2

## 2023-01-18 DIAGNOSIS — I10 ESSENTIAL HYPERTENSION: ICD-10-CM

## 2023-01-18 DIAGNOSIS — J06.9 VIRAL UPPER RESPIRATORY TRACT INFECTION: Primary | ICD-10-CM

## 2023-01-18 DIAGNOSIS — E03.8 OTHER SPECIFIED HYPOTHYROIDISM: ICD-10-CM

## 2023-01-18 LAB
FLUAV AG SPEC QL IA: NEGATIVE
FLUBV AG SPEC QL IA: NEGATIVE

## 2023-01-18 PROCEDURE — 99213 OFFICE O/P EST LOW 20 MIN: CPT | Mod: CS | Performed by: NURSE PRACTITIONER

## 2023-01-18 PROCEDURE — 87804 INFLUENZA ASSAY W/OPTIC: CPT | Performed by: NURSE PRACTITIONER

## 2023-01-18 PROCEDURE — U0005 INFEC AGEN DETEC AMPLI PROBE: HCPCS | Performed by: NURSE PRACTITIONER

## 2023-01-18 PROCEDURE — U0003 INFECTIOUS AGENT DETECTION BY NUCLEIC ACID (DNA OR RNA); SEVERE ACUTE RESPIRATORY SYNDROME CORONAVIRUS 2 (SARS-COV-2) (CORONAVIRUS DISEASE [COVID-19]), AMPLIFIED PROBE TECHNIQUE, MAKING USE OF HIGH THROUGHPUT TECHNOLOGIES AS DESCRIBED BY CMS-2020-01-R: HCPCS | Performed by: NURSE PRACTITIONER

## 2023-01-18 RX ORDER — LISINOPRIL 40 MG/1
TABLET ORAL
Qty: 90 TABLET | Refills: 0 | Status: SHIPPED | OUTPATIENT
Start: 2023-01-18 | End: 2023-05-02

## 2023-01-18 RX ORDER — LEVOTHYROXINE SODIUM 88 UG/1
88 TABLET ORAL DAILY
Qty: 90 TABLET | Refills: 0 | Status: SHIPPED | OUTPATIENT
Start: 2023-01-18 | End: 2023-08-01

## 2023-01-18 ASSESSMENT — PATIENT HEALTH QUESTIONNAIRE - PHQ9
10. IF YOU CHECKED OFF ANY PROBLEMS, HOW DIFFICULT HAVE THESE PROBLEMS MADE IT FOR YOU TO DO YOUR WORK, TAKE CARE OF THINGS AT HOME, OR GET ALONG WITH OTHER PEOPLE: NOT DIFFICULT AT ALL
SUM OF ALL RESPONSES TO PHQ QUESTIONS 1-9: 6
SUM OF ALL RESPONSES TO PHQ QUESTIONS 1-9: 6

## 2023-01-18 ASSESSMENT — PAIN SCALES - GENERAL: PAINLEVEL: NO PAIN (0)

## 2023-01-18 NOTE — PROGRESS NOTES
"  Assessment & Plan     Viral upper respiratory tract infection    - Symptomatic COVID-19 Virus (Coronavirus) by PCR Nasopharyngeal; Future  - Influenza A & B Antigen - Clinic Collect    Other specified hypothyroidism    - levothyroxine (SYNTHROID/LEVOTHROID) 88 MCG tablet; Take 1 tablet (88 mcg) by mouth daily    Essential hypertension    - lisinopril (ZESTRIL) 40 MG tablet; TAKE 1 TABLET(40MG) BY MOUTH DAILY    Fever    - Symptomatic COVID-19 Virus (Coronavirus) by PCR Nasopharyngeal    Good  Handwashing,  Masks  RF done needs PE 1-2 months.         BMI:   Estimated body mass index is 32.05 kg/m  as calculated from the following:    Height as of this encounter: 1.676 m (5' 6\").    Weight as of this encounter: 90.1 kg (198 lb 9.6 oz).           Dinah Molina NP  [facility] VENUS Rodriguez is a 73 year old accompanied by her daughter, presenting for the following health issues:  Cough (Cough, fever for 1.5 weeks)      HPI     Acute Illness  Acute illness concerns: URI, HA, fever  Onset/Duration: 10 days  Symptoms:  Fever: YES- up to 102  Chills/Sweats: YES  Headache (location?): YES  Sinus Pressure: No  Conjunctivitis:  No  Ear Pain: no  Rhinorrhea: No  Congestion: No  Sore Throat: YES  Cough: YES-non-productive  Wheeze: No  Decreased Appetite: No  Nausea: No  Vomiting: No  Diarrhea: No  Dysuria/Freq.: No  Dysuria or Hematuria: No  Fatigue/Achiness: YES  Sick/Strep Exposure: other daughter same sx, no travel, no Covid testing  Therapies tried and outcome: tylenol      Review of Systems   Constitutional, HEENT, cardiovascular, pulmonary, gi and gu systems are negative, except as otherwise noted.      Objective    BP (!) 156/84 (BP Location: Right arm, Patient Position: Sitting, Cuff Size: Adult Regular)   Pulse 107   Temp 98.5  F (36.9  C) (Oral)   Resp 16   Ht 1.676 m (5' 6\")   Wt 90.1 kg (198 lb 9.6 oz)   LMP  (LMP Unknown)   SpO2 98%   BMI 32.05 kg/m    Body mass index " is 32.05 kg/m .  Physical Exam   GENERAL: healthy, alert and no distress  EYES: Eyes grossly normal to inspection, PERRL and conjunctivae and sclerae normal  NECK: no adenopathy, no asymmetry, masses, or scars and thyroid normal to palpation  RESP: lungs clear to auscultation - no rales, rhonchi or wheezes  CV: regular rate and rhythm, normal S1 S2, no S3 or S4, no murmur, click or rub, no peripheral edema and peripheral pulses strong  ABDOMEN: soft, nontender, no hepatosplenomegaly, no masses and bowel sounds normal  PSYCH: mentation appears normal, affect normal/bright                    Answers for HPI/ROS submitted by the patient on 1/18/2023  If you checked off any problems, how difficult have these problems made it for you to do your work, take care of things at home, or get along with other people?: Not difficult at all  PHQ9 TOTAL SCORE: 6

## 2023-01-19 LAB — SARS-COV-2 RNA RESP QL NAA+PROBE: NEGATIVE

## 2023-02-16 ENCOUNTER — PATIENT OUTREACH (OUTPATIENT)
Dept: GERIATRIC MEDICINE | Facility: CLINIC | Age: 73
End: 2023-02-16
Payer: MEDICARE

## 2023-02-16 NOTE — PROGRESS NOTES
[facility] Care Coordination Contact      [facility] Six-Month Telephone Assessment    6 month telephone assessment completed on 2/16/2023.    ER visits: Yes -  [facility]  Hospitalizations: No  TCU stays: No  Significant health status changes: No significant changes. Noted an increase in pain and inflammation recently, but has had several appointments recently and has been working on this with her medical providers.   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    Caregiver Assessment follow up:  n/a    Goals: See POC in chart for goal progress documentation.      Will see member in 6 months for an annual health risk assessment.   Encouraged member to call CC with any questions or concerns in the meantime.     MATT Young  [facility]  [phone]

## 2023-04-30 DIAGNOSIS — I10 ESSENTIAL HYPERTENSION WITH GOAL BLOOD PRESSURE LESS THAN 140/90: ICD-10-CM

## 2023-04-30 DIAGNOSIS — I10 ESSENTIAL HYPERTENSION: ICD-10-CM

## 2023-05-02 RX ORDER — LISINOPRIL 40 MG/1
TABLET ORAL
Qty: 90 TABLET | Refills: 0 | Status: SHIPPED | OUTPATIENT
Start: 2023-05-02 | End: 2023-08-03

## 2023-05-02 RX ORDER — AMLODIPINE BESYLATE 10 MG/1
TABLET ORAL
Qty: 90 TABLET | Refills: 0 | Status: SHIPPED | OUTPATIENT
Start: 2023-05-02 | End: 2023-09-19

## 2023-05-02 RX ORDER — METOPROLOL SUCCINATE 50 MG/1
TABLET, EXTENDED RELEASE ORAL
Qty: 90 TABLET | Refills: 0 | Status: SHIPPED | OUTPATIENT
Start: 2023-05-02 | End: 2023-08-03

## 2023-05-02 NOTE — TELEPHONE ENCOUNTER
Routing refill request to provider for review/approval because:  Patient needs to be seen because:  she was due around 4/18/23 for physical exam  Blood pressure out of protocol range

## 2023-05-03 ENCOUNTER — APPOINTMENT (OUTPATIENT)
Dept: INTERPRETER SERVICES | Facility: CLINIC | Age: 73
End: 2023-05-03
Payer: MEDICARE

## 2023-05-03 NOTE — TELEPHONE ENCOUNTER
Meds refilled at this time,. Pt should establish with new provider for further refills. Pl advise pt .

## 2023-06-29 ENCOUNTER — PATIENT OUTREACH (OUTPATIENT)
Dept: GERIATRIC MEDICINE | Facility: CLINIC | Age: 73
End: 2023-06-29
Payer: MEDICARE

## 2023-07-31 DIAGNOSIS — I10 ESSENTIAL HYPERTENSION: ICD-10-CM

## 2023-07-31 NOTE — LETTER
7/31/2023        RE: Jennifer Stinson  [address] [address]      We received a refill request for your lisinopril and metoprolol. You are due for an in-clinic appointment. Please call [phone] or schedule an appointment via [employer] with ANY provider. Thank you!    Sincerely,        [facility]

## 2023-08-01 DIAGNOSIS — E03.8 OTHER SPECIFIED HYPOTHYROIDISM: ICD-10-CM

## 2023-08-01 RX ORDER — LEVOTHYROXINE SODIUM 88 UG/1
88 TABLET ORAL DAILY
Qty: 90 TABLET | Refills: 0 | Status: SHIPPED | OUTPATIENT
Start: 2023-08-01 | End: 2023-11-08

## 2023-08-02 ENCOUNTER — PATIENT OUTREACH (OUTPATIENT)
Dept: GERIATRIC MEDICINE | Facility: CLINIC | Age: 73
End: 2023-08-02
Payer: MEDICARE

## 2023-08-02 NOTE — PROGRESS NOTES
[facility] Care Coordination Contact    Called member to schedule annual HRA home visit. HRA has been scheduled for Thursday 8/10 at 2:30 pm.  Called Brittney Cornejo and scheduled an  for the home visit.    MATT Young  [facility]  [phone]

## 2023-08-03 RX ORDER — LISINOPRIL 40 MG/1
TABLET ORAL
Qty: 90 TABLET | Refills: 0 | Status: SHIPPED | OUTPATIENT
Start: 2023-08-03 | End: 2023-11-08

## 2023-08-03 RX ORDER — METOPROLOL SUCCINATE 50 MG/1
TABLET, EXTENDED RELEASE ORAL
Qty: 90 TABLET | Refills: 0 | Status: SHIPPED | OUTPATIENT
Start: 2023-08-03 | End: 2023-11-08

## 2023-08-08 NOTE — PROGRESS NOTES
[facility] Care Coordination Contact    Daughter called and reported that Jennifer was feeling sick and needed to reschedule her home visit. Home visit has been rescheduled to August 17th at 2:30pm. CC contacted [employer] translation services and updated the time.     MATT Young  [facility]  [phone]

## 2023-08-09 ENCOUNTER — VIRTUAL VISIT (OUTPATIENT)
Dept: PEDIATRICS | Facility: CLINIC | Age: 73
End: 2023-08-09
Payer: MEDICARE

## 2023-08-09 DIAGNOSIS — J06.9 UPPER RESPIRATORY TRACT INFECTION, UNSPECIFIED TYPE: Primary | ICD-10-CM

## 2023-08-09 DIAGNOSIS — R07.0 THROAT PAIN: ICD-10-CM

## 2023-08-09 PROCEDURE — 99213 OFFICE O/P EST LOW 20 MIN: CPT | Mod: 93 | Performed by: NURSE PRACTITIONER

## 2023-08-09 RX ORDER — BENZONATATE 200 MG/1
200 CAPSULE ORAL 3 TIMES DAILY PRN
Qty: 20 CAPSULE | Refills: 0 | Status: SHIPPED | OUTPATIENT
Start: 2023-08-09 | End: 2023-11-08

## 2023-08-09 NOTE — PROGRESS NOTES
Jennifer is a 73 year old who is being evaluated via a billable telephone visit.      What phone number would you like to be contacted at? [phone]   How would you like to obtain your AVS? Ward    Distant Location (provider location):  Off-site    Assessment & Plan     Upper respiratory tract infection, unspecified type  Throat pain  Reports 7 day history of URI symptoms, most of which have resolved with exception of nocturnal cough and throat pain. Will rule out strep pharyngitis and COVID-19. If these tests are negative, recommend continuing supportive cares at home with the understanding that most viral illnesses resolve after 7-10 days. Will try tessalon capsule for cough. Should symptoms not continue to improve or should they worsen, it would be best for patient to be evaluated in clinic.   - Symptomatic COVID-19 Virus (Coronavirus) by PCR; Future  - benzonatate (TESSALON) 200 MG capsule; Take 1 capsule (200 mg) by mouth 3 times daily as needed for cough  - Streptococcus A Rapid Screen w/Reflex to PCR - Clinic Collect; Future  - PRIMARY CARE FOLLOW-UP SCHEDULING; Future    20 minutes spent by me on the date of the encounter doing chart review, patient visit, documentation, and discussion with family          MEDICATIONS:        - Start taking tessalon capsules       - Continue other medications without change  FUTURE LABS:       - Schedule lab visit at earliest convenience    Follow-up: Lab results pending, will follow-up as indicated after reviewing results.     KEN Ma [facility] [place]    Subjective   Jennifer is a 73 year old, presenting for the following health issues:  Cough and Nausea         No data to display                HPI       Reports one week history of cough, fever, nausea, vomiting. Is doing a little better now compared to when symptoms started. Fevers have resolved for the most part, treated with aleve and tylenol. Of note, these were subjective fevers. Nausea  resolved. Cough worse at night. Denies chest pain or shortness of breath. Throat pain continues. Yesterday she began to eat and drink more. Urinating at baseline. No home covid tests have been performed. Family is concerned because this illness went through the household but she is the only one who hasn't been feeling better.       Patient Active Problem List   Diagnosis    Hyperlipidemia with target LDL less than 130    Essential hypertension    Gastroesophageal reflux disease without esophagitis    Other specified hypothyroidism    Abdominal pain    Stroke (H)    Cerebrovascular accident (CVA), unspecified mechanism (H)    Rheumatoid arthritis (H)     Past Medical History:   Diagnosis Date    GERD (gastroesophageal reflux disease)     Hyperlipidemia LDL goal < 130     Hypertension     Hypothyroidism     Stroke (H)      Current Outpatient Medications   Medication    benzonatate (TESSALON) 200 MG capsule    amLODIPine (NORVASC) 10 MG tablet    aspirin  MG EC tablet    atorvastatin (LIPITOR) 40 MG tablet    levothyroxine (SYNTHROID/LEVOTHROID) 88 MCG tablet    Lidocaine (LIDOCARE) 4 % Patch    lisinopril (ZESTRIL) 40 MG tablet    metoprolol succinate ER (TOPROL XL) 50 MG 24 hr tablet    multiple vitamin  tablet TABS    omeprazole (PRILOSEC) 20 MG DR capsule     Current Facility-Administered Medications   Medication    lidocaine 1 % injection 4 mL    lidocaine 1 % injection 4 mL    triamcinolone (KENALOG-40) injection 40 mg    triamcinolone (KENALOG-40) injection 40 mg        Allergies   Allergen Reactions    Eggs Or Egg-Derived Products [Chicken-Derived Products (Egg)]     No Clinical Screening - See Comments Swelling and Rash     Other reaction(s): Edema  All meat: poultry, beef, etc.  Reaction: swelling  Cinnamon and other spices  Reaction: face swelling, rash  Cinnamon and other spices  Reaction: face swelling, rash  All meat: poultry, beef, etc.  Reaction: swelling    Nuts Other (See Comments) and Rash      All nuts  Reaction: swelling, rash, headache    Peanut-Containing Drug Products Rash     Other reaction(s): Headache  All nuts  Reaction: swelling, rash, headache         Review of Systems    ROS: 10 point ROS neg other than the symptoms noted above in the HPI.        Objective       Vitals:  No vitals were obtained today due to virtual visit.    Physical Exam   Exam unable to be completed due to telephone visits          Phone call duration: 7 minutes

## 2023-08-10 ENCOUNTER — LAB (OUTPATIENT)
Dept: LAB | Facility: CLINIC | Age: 73
End: 2023-08-10
Attending: NURSE PRACTITIONER
Payer: MEDICARE

## 2023-08-10 DIAGNOSIS — J06.9 UPPER RESPIRATORY TRACT INFECTION, UNSPECIFIED TYPE: ICD-10-CM

## 2023-08-10 DIAGNOSIS — R07.0 THROAT PAIN: ICD-10-CM

## 2023-08-10 DIAGNOSIS — J02.0 STREPTOCOCCAL PHARYNGITIS: Primary | ICD-10-CM

## 2023-08-10 LAB — DEPRECATED S PYO AG THROAT QL EIA: POSITIVE

## 2023-08-10 PROCEDURE — 87880 STREP A ASSAY W/OPTIC: CPT

## 2023-08-10 PROCEDURE — 87635 SARS-COV-2 COVID-19 AMP PRB: CPT

## 2023-08-10 RX ORDER — AMOXICILLIN 500 MG/1
500 CAPSULE ORAL 2 TIMES DAILY
Qty: 20 CAPSULE | Refills: 0 | Status: SHIPPED | OUTPATIENT
Start: 2023-08-10 | End: 2023-08-20

## 2023-08-11 LAB — SARS-COV-2 RNA RESP QL NAA+PROBE: POSITIVE

## 2023-08-12 ENCOUNTER — TELEPHONE (OUTPATIENT)
Dept: NURSING | Facility: CLINIC | Age: 73
End: 2023-08-12
Payer: MEDICARE

## 2023-08-12 NOTE — TELEPHONE ENCOUNTER
Patient classified as COVID treatment eligible by Epic high risk algorithm:  Yes    Coronavirus (COVID-19) Notification    Reason for call  Notify of POSITIVE COVID-19 lab result, assess symptoms,  review [facility] recommendations    Lab Result   Lab test for 2019-nCoV rRt-PCR or SARS-COV-2 PCR  Oropharyngeal AND/OR nasopharyngeal swabs were POSITIVE for 2019-nCoV RNA [OR] SARS-COV-2 RNA (COVID-19) RNA     We have been unable to reach patient by phone at this time to notify of their Positive COVID-19 result.    Left voicemail message requesting a call back to [phone] [facility] for results.        A Positive COVID-19 letter will be sent via [email or web address] or the mail.    Deanna Amaya, PTA

## 2023-08-23 ENCOUNTER — PATIENT OUTREACH (OUTPATIENT)
Dept: GERIATRIC MEDICINE | Facility: CLINIC | Age: 73
End: 2023-08-23
Payer: MEDICARE

## 2023-08-23 NOTE — Clinical Note
Hello,  I am the Critical access hospital care coordinator for Jennifer Stinson.  I am writing to let you know I completed Jennifer's annual assessment. All of my documentation can be found in EPIC. Please do not hesitate to contact me with any questions or concerns.  MATT Young [facility] Care Coordinator [phone]

## 2023-08-29 SDOH — ECONOMIC STABILITY: TRANSPORTATION INSECURITY
IN THE PAST 12 MONTHS, HAS LACK OF TRANSPORTATION KEPT YOU FROM MEETINGS, WORK, OR FROM GETTING THINGS NEEDED FOR DAILY LIVING?: NO

## 2023-08-29 SDOH — HEALTH STABILITY: PHYSICAL HEALTH: ON AVERAGE, HOW MANY MINUTES DO YOU ENGAGE IN EXERCISE AT THIS LEVEL?: 0 MIN

## 2023-08-29 SDOH — ECONOMIC STABILITY: INCOME INSECURITY: IN THE LAST 12 MONTHS, WAS THERE A TIME WHEN YOU WERE NOT ABLE TO PAY THE MORTGAGE OR RENT ON TIME?: NO

## 2023-08-29 SDOH — HEALTH STABILITY: PHYSICAL HEALTH: ON AVERAGE, HOW MANY DAYS PER WEEK DO YOU ENGAGE IN MODERATE TO STRENUOUS EXERCISE (LIKE A BRISK WALK)?: 0 DAYS

## 2023-08-29 SDOH — ECONOMIC STABILITY: FOOD INSECURITY: WITHIN THE PAST 12 MONTHS, YOU WORRIED THAT YOUR FOOD WOULD RUN OUT BEFORE YOU GOT MONEY TO BUY MORE.: NEVER TRUE

## 2023-08-29 SDOH — ECONOMIC STABILITY: TRANSPORTATION INSECURITY
IN THE PAST 12 MONTHS, HAS THE LACK OF TRANSPORTATION KEPT YOU FROM MEDICAL APPOINTMENTS OR FROM GETTING MEDICATIONS?: NO

## 2023-08-29 SDOH — ECONOMIC STABILITY: FOOD INSECURITY: WITHIN THE PAST 12 MONTHS, THE FOOD YOU BOUGHT JUST DIDN'T LAST AND YOU DIDN'T HAVE MONEY TO GET MORE.: NEVER TRUE

## 2023-08-29 ASSESSMENT — LIFESTYLE VARIABLES
HOW OFTEN DO YOU HAVE SIX OR MORE DRINKS ON ONE OCCASION: NEVER
AUDIT-C TOTAL SCORE: 0
SKIP TO QUESTIONS 9-10: 1
HOW OFTEN DO YOU HAVE A DRINK CONTAINING ALCOHOL: NEVER
HOW MANY STANDARD DRINKS CONTAINING ALCOHOL DO YOU HAVE ON A TYPICAL DAY: PATIENT DOES NOT DRINK

## 2023-08-29 ASSESSMENT — SOCIAL DETERMINANTS OF HEALTH (SDOH)
HOW HARD IS IT FOR YOU TO PAY FOR THE VERY BASICS LIKE FOOD, HOUSING, MEDICAL CARE, AND HEATING?: NOT VERY HARD
WITHIN THE LAST YEAR, HAVE YOU BEEN KICKED, HIT, SLAPPED, OR OTHERWISE PHYSICALLY HURT BY YOUR PARTNER OR EX-PARTNER?: NO
WITHIN THE LAST YEAR, HAVE YOU BEEN AFRAID OF YOUR PARTNER OR EX-PARTNER?: NO
WITHIN THE LAST YEAR, HAVE YOU BEEN HUMILIATED OR EMOTIONALLY ABUSED IN OTHER WAYS BY YOUR PARTNER OR EX-PARTNER?: NO
IN A TYPICAL WEEK, HOW MANY TIMES DO YOU TALK ON THE PHONE WITH FAMILY, FRIENDS, OR NEIGHBORS?: THREE TIMES A WEEK
WITHIN THE LAST YEAR, HAVE TO BEEN RAPED OR FORCED TO HAVE ANY KIND OF SEXUAL ACTIVITY BY YOUR PARTNER OR EX-PARTNER?: NO
HOW OFTEN DO YOU ATTENT MEETINGS OF THE CLUB OR ORGANIZATION YOU BELONG TO?: MORE THAN 4 TIMES PER YEAR
HOW OFTEN DO YOU ATTEND CHURCH OR RELIGIOUS SERVICES?: MORE THAN 4 TIMES PER YEAR
DO YOU BELONG TO ANY CLUBS OR ORGANIZATIONS SUCH AS CHURCH GROUPS UNIONS, FRATERNAL OR ATHLETIC GROUPS, OR SCHOOL GROUPS?: YES
HOW OFTEN DO YOU GET TOGETHER WITH FRIENDS OR RELATIVES?: MORE THAN THREE TIMES A WEEK

## 2023-08-29 ASSESSMENT — ACTIVITIES OF DAILY LIVING (ADL)
DEPENDENT_IADLS:: CLEANING;COOKING;LAUNDRY;SHOPPING;MEAL PREPARATION;MEDICATION MANAGEMENT;MONEY MANAGEMENT;TRANSPORTATION

## 2023-08-29 NOTE — PROGRESS NOTES
[facility] Care Coordination Contact    [facility] Home Visit Assessment     Home visit for Health Risk Assessment with Jennifer Stinson completed on August 23, 2023    Type of residence: Private home - stairs  Current living arrangement: I live in a private home with family     Assessment completed with: Patient, Children, Spouse or significant other    Current Care Plan  Member currently receiving the following home care services: None    Member currently receiving the following community resources: DME, Housekeeping/Chore Agency, PCA    Medication Review  Medication reconciliation completed in Epic: Yes  Medication set-up & administration: Independent-does not set up.  Family caregiver administers medications.  Medication Risk Assessment Medication (1 or more, place referral to MTM): N/A: No risk factors identified  MTM Referral Placed: No: No risk factors idenified    Mental/Behavioral Health   Depression Screening: None  PHQ-2 Total Score (Adult) - Positive if 3 or more points; Administer PHQ-9 if positive: 2  Mental health DX: No        Falls Assessment:   Fallen 2 or more times in the past year?: No   Any fall with injury in the past year?: No    ADL/IADL Dependencies:   Dependent ADLs: Ambulation-walker, Bathing, Dressing, Toileting, Transfers  Dependent IADLs: Cleaning, Cooking, Laundry, Shopping, Meal Preparation, Medication Management, Money Management, Transportation    [facility] Plan sponsored benefits: Shared information re: Silver Sneakers/gym memberships, ASA, Calcium +D.    PCA Assessment completed at visit: Yes Annual PCA assessment indicated 4 hours per day of PCA. This is the same as the previous assessment.     Elderly Waiver Eligibility: Yes-will continue on EW    Care Plan & Recommendations: Jennifer Stinson is a 73 year old female with a past medical history of hypertension, GERD, stroke, abdominal pain, and CVA.     Jennifer resides in a single family home with her spouse and daughter.  Jennifer has 11 children many of which are in the [place]. Jennifer has been  to [place] for many years. Daughter is currently assisting with managing ADL dependencies as paid PCA. Jennifer has several other children who live in the area. Frequent attenders of the mask in [place].     Reviewed previous assessment today and ADL dependencies didn't change-continues to need assistance with dressing, transfers, mobility, and toileting related to generalized weakness/fatigue, joint pain and associated swelling, and limited mobility. PCA services will continue. Homemaking in place to assist with weekly household management as well.     See [facility] for detailed assessment information.    Follow-Up Plan: Member informed of future contact, plan to f/u with member with a 6 month telephone assessment.  Contact information shared with member and family, encouraged member to call with any questions or concerns at any time.    [facility] care continuum providers: Please see Snapshot and Care Management Flowsheets for Specific details of care plan.    This CC note routed to Dinah Molina NP PCP provider on file.    MATT Young  [facility]  [phone]

## 2023-08-30 ENCOUNTER — PATIENT OUTREACH (OUTPATIENT)
Dept: GERIATRIC MEDICINE | Facility: CLINIC | Age: 73
End: 2023-08-30
Payer: MEDICARE

## 2023-08-30 NOTE — PROGRESS NOTES
[facility] Care Coordination Contact    Received after visit chart from care coordinator.  Completed following tasks: Mailed copy of care plan to client, Mailed Safe Medication Disposal , Mailed [facility] Leave Behind Letter, Submitted referrals/auths for hmkg, and Updated services in Database  , Provider Signature - No POC Shared:  Member indicates that they do not want their POC shared with any EW providers.    [facility]:  Emailed completed PCA assessment to [facility].  Faxed copy of PCA assessment to PCA Agency and mailed copy to member.  Faxed MD Communication to PCP.     Polina Frazier  Care Management Specialist  [facility]  [phone]

## 2023-08-30 NOTE — LETTER
August 30, 2023    JACKELYN IRBY  [address]  [place] [address]        Dear Jackelyn:    At [facility], we re dedicated to improving your health and wellness. Enclosed is the Care Plan developed with you on 8/23/2023. Please review the Care Plan carefully.    As a reminder, during your visit we talked about:  Ways to manage your physical and mental health  Using health care to maintain and improve your health   Your preventive care needs     Remember to contact your care coordinator if you:  Are hospitalized, or plan to be hospitalized   Have a fall    Have a change in your physical or mental health  Need help finding support or services    If you have questions, or don t agree with your Care Plan, call me at [phone]. You can also call me if your needs change. TTY users, call the [place] [employer] at (449) or 1-864.775.9520 (psiyfu-vv-twzzgm relay service).    Sincerely,        MATT Young  [phone]  [email or web address]    [id number] accepted    [id number] (07/2022)

## 2023-09-15 DIAGNOSIS — I10 ESSENTIAL HYPERTENSION: ICD-10-CM

## 2023-09-15 RX ORDER — METOPROLOL SUCCINATE 50 MG/1
TABLET, EXTENDED RELEASE ORAL
Qty: 90 TABLET | Refills: 0 | OUTPATIENT
Start: 2023-09-15

## 2023-09-20 NOTE — TELEPHONE ENCOUNTER
Patients Vasile Montelongorah called and scheduled OV with Dr. Luis for 11/08 at 1:40 pm to Establish Care.  Please continue to refill until seen.

## 2023-10-27 DIAGNOSIS — R07.9 CHEST PAIN, UNSPECIFIED TYPE: ICD-10-CM

## 2023-10-27 RX ORDER — ATORVASTATIN CALCIUM 40 MG/1
40 TABLET, FILM COATED ORAL DAILY
Qty: 90 TABLET | Refills: 0 | Status: SHIPPED | OUTPATIENT
Start: 2023-10-27 | End: 2024-04-24

## 2023-10-27 NOTE — TELEPHONE ENCOUNTER
Patient is a BV patient. Has only seen Sona for acute visits. Forward to BV refill pool.   Josie Selby PA-C

## 2023-11-08 ENCOUNTER — OFFICE VISIT (OUTPATIENT)
Dept: INTERNAL MEDICINE | Facility: CLINIC | Age: 73
End: 2023-11-08
Payer: MEDICARE

## 2023-11-08 VITALS
SYSTOLIC BLOOD PRESSURE: 122 MMHG | HEART RATE: 89 BPM | BODY MASS INDEX: 31.71 KG/M2 | RESPIRATION RATE: 18 BRPM | DIASTOLIC BLOOD PRESSURE: 86 MMHG | TEMPERATURE: 98.7 F | HEIGHT: 66 IN | OXYGEN SATURATION: 97 % | WEIGHT: 197.3 LBS

## 2023-11-08 DIAGNOSIS — E03.8 OTHER SPECIFIED HYPOTHYROIDISM: ICD-10-CM

## 2023-11-08 DIAGNOSIS — Z91.018 FOOD ALLERGY: ICD-10-CM

## 2023-11-08 DIAGNOSIS — G89.29 CHRONIC PAIN OF LEFT KNEE: ICD-10-CM

## 2023-11-08 DIAGNOSIS — I10 ESSENTIAL HYPERTENSION WITH GOAL BLOOD PRESSURE LESS THAN 140/90: ICD-10-CM

## 2023-11-08 DIAGNOSIS — K21.9 GASTROESOPHAGEAL REFLUX DISEASE WITHOUT ESOPHAGITIS: Primary | ICD-10-CM

## 2023-11-08 DIAGNOSIS — I12.9 HYPERTENSIVE CHRONIC KIDNEY DISEASE WITH STAGE 1 THROUGH STAGE 4 CHRONIC KIDNEY DISEASE, OR UNSPECIFIED CHRONIC KIDNEY DISEASE: ICD-10-CM

## 2023-11-08 DIAGNOSIS — M25.562 CHRONIC PAIN OF LEFT KNEE: ICD-10-CM

## 2023-11-08 DIAGNOSIS — I63.9 ISCHEMIC STROKE OF FRONTAL LOBE (H): ICD-10-CM

## 2023-11-08 PROCEDURE — 99215 OFFICE O/P EST HI 40 MIN: CPT | Performed by: INTERNAL MEDICINE

## 2023-11-08 RX ORDER — METOPROLOL SUCCINATE 50 MG/1
50 TABLET, EXTENDED RELEASE ORAL DAILY
Qty: 90 TABLET | Refills: 3 | Status: SHIPPED | OUTPATIENT
Start: 2023-11-08

## 2023-11-08 RX ORDER — AMLODIPINE BESYLATE 10 MG/1
10 TABLET ORAL DAILY
Qty: 90 TABLET | Refills: 3 | Status: SHIPPED | OUTPATIENT
Start: 2023-11-08 | End: 2024-02-01

## 2023-11-08 RX ORDER — LISINOPRIL 40 MG/1
TABLET ORAL
Qty: 90 TABLET | Refills: 3 | Status: SHIPPED | OUTPATIENT
Start: 2023-11-08 | End: 2024-02-01

## 2023-11-08 RX ORDER — SUCRALFATE 1 G/1
1 TABLET ORAL 4 TIMES DAILY
Qty: 60 TABLET | Refills: 0 | Status: SHIPPED | OUTPATIENT
Start: 2023-11-08 | End: 2024-01-10

## 2023-11-08 RX ORDER — LEVOTHYROXINE SODIUM 88 UG/1
88 TABLET ORAL DAILY
Qty: 90 TABLET | Refills: 3 | Status: SHIPPED | OUTPATIENT
Start: 2023-11-08

## 2023-11-08 RX ORDER — ASPIRIN 325 MG
325 TABLET, DELAYED RELEASE (ENTERIC COATED) ORAL DAILY
Qty: 90 TABLET | Refills: 3 | Status: SHIPPED | OUTPATIENT
Start: 2023-11-08

## 2023-11-08 NOTE — PROGRESS NOTES
"  Assessment & Plan     Gastroesophageal reflux disease without esophagitis  - omeprazole (PRILOSEC) 20 MG DR capsule; TAKE 1 CAPSULE(20MG) BY MOUTH ONCE DAILY 30-60 MINUTES BEFORE A MEAL  - sucralfate (CARAFATE) 1 GM tablet; Take 1 tablet (1 g) by mouth 4 times daily    Other specified hypothyroidism  - levothyroxine (SYNTHROID/LEVOTHROID) 88 MCG tablet; Take 1 tablet (88 mcg) by mouth daily    Essential hypertension with goal blood pressure less than 140/90  - metoprolol succinate ER (TOPROL XL) 50 MG 24 hr tablet; Take 1 tablet (50 mg) by mouth daily  - lisinopril (ZESTRIL) 40 MG tablet; TAKE 1 TABLET(40 MG) BY MOUTH DAILY  - amLODIPine (NORVASC) 10 MG tablet; Take 1 tablet (10 mg) by mouth daily No further refills until seen by provider    Food allergy  - Nutrition Referral; Future  - Adult Allergy/Asthma  Referral; Future    Hypertensive chronic kidney disease with stage 1 through stage 4 chronic kidney disease, or unspecified chronic kidney disease  - Nutrition Referral; Future    Ischemic stroke of frontal lobe (H)  - aspirin (ASA) 325 MG EC tablet; Take 1 tablet (325 mg) by mouth daily    Chronic pain of left knee  - Orthopedic  Referral; Future    Patient comes in today to establish care.  Multiple chronic medical conditions were discussed as outlined in A and P above and HPI below.  Overall, patient has no new concerns.  Has had a gap in medication refills since she had not establish care with a new provider after last provider left the office.  Medication refills completed today.    Most recent lab work reviewed.  Patient prefers for updated lab work to be completed during annual wellness check in 3 months.    43 minutes spent by me on the date of the encounter doing chart review, history and exam, documentation and further activities per the note       BMI:   Estimated body mass index is 31.85 kg/m  as calculated from the following:    Height as of this encounter: 1.676 m (5' 6\").    " "Weight as of this encounter: 89.5 kg (197 lb 4.8 oz).           Erika Luis MD  [facility] VENUS Rodriguez is a 73 year old, presenting for the following health issues:  Establish Care (Knee pain mostly left side started two weeks, constipation concern )        11/8/2023     1:30 PM   Additional Questions   Roomed by Rodi  Daughter and Grand daughter        History of Present Illness       Diabetes:   She presents for follow up of diabetes.  She is checking home blood glucose one time daily.   She checks blood glucose before meals.  Blood glucose is never over 200 and never under 70. She is aware of hypoglycemia symptoms including none.   She is concerned about none and other. She has no concerns regarding her diabetes at this time.  She is having redness, sores, or blisters on feet.            Hyperlipidemia:  She presents for follow up of hyperlipidemia.   She is taking medication to lower cholesterol. She is having myalgia or other side effects to statin medications.    Hypertension: She presents for follow up of hypertension.  She does check blood pressure  regularly outside of the clinic. Outside blood pressures have been over 140/90. She does not follow a low salt diet.     Hypothyroidism:     Since last visit, patient describes the following symptoms::  None    She eats 0-1 servings of fruits and vegetables daily.She consumes 0 sweetened beverage(s) daily.She exercises with enough effort to increase her heart rate 9 or less minutes per day.  She exercises with enough effort to increase her heart rate 3 or less days per week.   She is taking medications regularly.                 Review of Systems   Constitutional, HEENT, cardiovascular, pulmonary, gi and gu systems are negative, except as otherwise noted.      Objective    /86   Pulse 89   Temp 98.7  F (37.1  C) (Tympanic)   Resp 18   Ht 1.676 m (5' 6\")   Wt 89.5 kg (197 lb 4.8 oz)   LMP  (LMP Unknown)   SpO2 97%  "  BMI 31.85 kg/m    Body mass index is 31.85 kg/m .  Physical Exam   GENERAL: healthy, alert and no distress  RESP: lungs clear to auscultation - no rales, rhonchi or wheezes  CV: regular rate and rhythm, normal S1 S2  MS: no gross musculoskeletal defects noted, no edema  NEURO: Normal strength and tone, mentation intact and speech normal  PSYCH: mentation appears normal, affect normal.

## 2023-11-10 ENCOUNTER — TELEPHONE (OUTPATIENT)
Dept: ALLERGY | Facility: CLINIC | Age: 73
End: 2023-11-10
Payer: MEDICARE

## 2023-11-10 NOTE — TELEPHONE ENCOUNTER
Via phone patient was scheduled for the following:    Appointment type: New  Provider: Dr. Dasilva  Return date: 5-28-23  Specialty phone number: [phone]

## 2023-12-09 ENCOUNTER — HEALTH MAINTENANCE LETTER (OUTPATIENT)
Age: 73
End: 2023-12-09

## 2024-01-05 ENCOUNTER — OFFICE VISIT (OUTPATIENT)
Dept: ORTHOPEDICS | Facility: CLINIC | Age: 74
End: 2024-01-05
Attending: INTERNAL MEDICINE
Payer: MEDICARE

## 2024-01-05 ENCOUNTER — ANCILLARY PROCEDURE (OUTPATIENT)
Dept: GENERAL RADIOLOGY | Facility: CLINIC | Age: 74
End: 2024-01-05
Attending: STUDENT IN AN ORGANIZED HEALTH CARE EDUCATION/TRAINING PROGRAM
Payer: MEDICARE

## 2024-01-05 VITALS
HEIGHT: 66 IN | BODY MASS INDEX: 31.66 KG/M2 | SYSTOLIC BLOOD PRESSURE: 140 MMHG | DIASTOLIC BLOOD PRESSURE: 84 MMHG | WEIGHT: 197 LBS

## 2024-01-05 DIAGNOSIS — M05.79 RHEUMATOID ARTHRITIS INVOLVING MULTIPLE SITES WITH POSITIVE RHEUMATOID FACTOR (H): ICD-10-CM

## 2024-01-05 DIAGNOSIS — G89.29 CHRONIC PAIN OF LEFT KNEE: ICD-10-CM

## 2024-01-05 DIAGNOSIS — M17.0 BILATERAL PRIMARY OSTEOARTHRITIS OF KNEE: ICD-10-CM

## 2024-01-05 DIAGNOSIS — M25.562 CHRONIC PAIN OF LEFT KNEE: ICD-10-CM

## 2024-01-05 DIAGNOSIS — M17.12 LOCALIZED OSTEOARTHRITIS OF LEFT KNEE: Primary | ICD-10-CM

## 2024-01-05 PROCEDURE — 73562 X-RAY EXAM OF KNEE 3: CPT | Mod: TC | Performed by: RADIOLOGY

## 2024-01-05 PROCEDURE — 20611 DRAIN/INJ JOINT/BURSA W/US: CPT | Mod: LT | Performed by: STUDENT IN AN ORGANIZED HEALTH CARE EDUCATION/TRAINING PROGRAM

## 2024-01-05 PROCEDURE — 99213 OFFICE O/P EST LOW 20 MIN: CPT | Mod: 25 | Performed by: STUDENT IN AN ORGANIZED HEALTH CARE EDUCATION/TRAINING PROGRAM

## 2024-01-05 PROCEDURE — 73560 X-RAY EXAM OF KNEE 1 OR 2: CPT | Mod: TC | Performed by: RADIOLOGY

## 2024-01-05 RX ORDER — ROPIVACAINE HYDROCHLORIDE 5 MG/ML
3 INJECTION, SOLUTION EPIDURAL; INFILTRATION; PERINEURAL
Status: SHIPPED | OUTPATIENT
Start: 2024-01-05

## 2024-01-05 RX ORDER — TRIAMCINOLONE ACETONIDE 40 MG/ML
40 INJECTION, SUSPENSION INTRA-ARTICULAR; INTRAMUSCULAR
Status: SHIPPED | OUTPATIENT
Start: 2024-01-05

## 2024-01-05 RX ORDER — LIDOCAINE HYDROCHLORIDE 10 MG/ML
3 INJECTION, SOLUTION INFILTRATION; PERINEURAL
Status: SHIPPED | OUTPATIENT
Start: 2024-01-05

## 2024-01-05 RX ADMIN — ROPIVACAINE HYDROCHLORIDE 3 ML: 5 INJECTION, SOLUTION EPIDURAL; INFILTRATION; PERINEURAL at 14:32

## 2024-01-05 RX ADMIN — TRIAMCINOLONE ACETONIDE 40 MG: 40 INJECTION, SUSPENSION INTRA-ARTICULAR; INTRAMUSCULAR at 14:32

## 2024-01-05 RX ADMIN — LIDOCAINE HYDROCHLORIDE 3 ML: 10 INJECTION, SOLUTION INFILTRATION; PERINEURAL at 14:32

## 2024-01-05 NOTE — PATIENT INSTRUCTIONS
"Thank you for choosing [facility]!    DR. BARRAZA'S CLINIC LOCATIONS:     [place]  TRIAGE LINE: [phone] [address] APPOINTMENTS: [phone]   [place] [address] RADIOLOGY: [phone]   (Mondays & Tuesdays) HAND THERAPY: [phone]    PHYSICAL THERAPY: [phone]   [place] BILLING QUESTIONS: [phone] [address] FAX: [phone]   [place] [address]    (Thursdays & Fridays)       Non-Operative treatment of Knee Osteoarthritis    To Decrease Stress  Stay fit and get regular exercise    Muscle Strengthening: Consider physical therapy  Activity Modification: Avoid high-impact activities  Weight Control  Consider using walking poles/cane or a knee brace to offload knee    To Decrease Pain  Tylenol (Acetaminophen) as needed 2 tablets (1,000 mg) three times per day, not to exceed 3,000 mg per day   Trial topical Voltaren (Diclofenac) gel up to 4x daily to area of pain  Glucosamine chondroiten (try taking for 3 months and if helpful, continue)  Steroid injections (no sooner than every 3 months)  Viscosupplementation/\"gel\" injections (Synvisc, Euflexxa, etc. are brand names)  Platelet Rich Plasma (Not covered by insurance)     Post-Injection Discharge Instructions    You may shower, however avoid swimming, tub baths or hot tubs for 24 hours following your procedure  You may have a mild to moderate increase in pain for a few days following the injection.  The lidocaine (local numbing medicine) will wear off in several hours. It usually takes 3-5 days for the steroid medication to start working although it may take up to 14 days for full effect.   You may use ice packs for 10-15 minutes, 3 to 4 times a day at the injection site for comfort if needed  You may use extra strength Tylenol for pain control if necessary   If you were fasting, you may resume your normal diet and medications after the procedure  If you have diabetes, your blood sugar may be " higher than normal for 10-14 days following a steroid injection. Contact your doctor who manages your diabetes if your blood sugar is significantly higher than usual    If you experience any of the following, call Sports Medicine @ [phone] or [phone]  -Fever over 100 degrees F  -Swelling, bleeding, redness, drainage, warmth at the injection site  -New or significant worsening pain

## 2024-01-05 NOTE — LETTER
1/5/2024         RE: Jennifer Stinson  [address] [address]        Dear Colleague,    Thank you for referring your patient, Jennifer Stinson, to the [facility] [place]. Please see a copy of my visit note below.    ASSESSMENT & PLAN    Jennifer was seen today for pain.    Diagnoses and all orders for this visit:    Localized osteoarthritis of left knee  -     Large Joint Injection/Arthocentesis: L knee joint    Chronic pain of left knee  -     Orthopedic  Referral    Bilateral primary osteoarthritis of knee  -     Cancel: XR Knee Left 3 Views; Future    Rheumatoid arthritis involving multiple sites with positive rheumatoid factor (H)      74-year-old female presents for evaluation of left knee pain ongoing for many years secondary to osteoarthritis.  Reviewed prior history and laboratory values and she does have a history of + RF but has not seen rheumatology.  She does not report any systemic joint pain or swelling today but does have ongoing left knee pain that responded well to previous steroid injection with approximately 1 year of good relief.    Plan:  - Repeat left knee steroid injection in clinic today.  If diminishing relief, could consider trial of hyaluronic acid injection in the future.  Discussed that if she continues to have 1 year of pain relief from her injections, we can continue to do these   - Will repeat left knee x-ray today  - Start home exercise program, provided in clinic today  - Tylenol Extra Strength (1000mg) up to three times daily as needed for pain  -Denies any symptoms of inflammatory arthritis today, however would consider formal rheumatology referral in the future if symptoms recur      Return if symptoms worsen or fail to improve.      Dr. Suzette Mcwilliams, DO  [facility]  Sports Medicine     -----  Chief Complaint   Patient presents with     Left Knee - Pain       SUBJECTIVE  Jennifer Stinson is a/an  "74 year old female who is seen in consultation at the request of  Erika Luis M.D. for evaluation of left knee pain.  Onset was 2 years ago. Pain is located left medial and lateral knee. Symptoms are worsened by walking, bending.  She has tried cortisone injection which provided good relief, cold and hot water. Associated symptoms include intermittent Swelling in bilateral lower legs.    The patient is seen with daughter, who helps provide the history    Prior injury/Surgical history of affected joint: no  Social History/Occupation: retired    REVIEW OF SYSTEMS:  Pertinent positives/negative: As stated above in HPI    OBJECTIVE:  BP (!) 140/84   Ht 1.676 m (5' 6\")   Wt 89.4 kg (197 lb)   LMP  (LMP Unknown)   BMI 31.80 kg/m     General: Alert and in no distress  Skin: no visable rashes  CV: Extremities appear well perfused   Resp: normal respiratory effort, no conversational dyspnea   Psych: normal mood, affect  MSK:  LEFT KNEE  Inspection:    Normal alignment; no edema, erythema, or ecchymosis present  Palpation:    Tender about the lateral patellar facet, lateral joint line, and medial joint line. Remainder of bony and ligamentous landmarks are nontender.    Small effusion is present    Patellofemoral crepitus is Present  Range of Motion:     00 extension to 1200 flexion  Strength:    Extensor mechanism intact      RADIOLOGY:  Final results and radiologist's interpretation available in the [facility] health record.  Images below were personally reviewed and discussed with the patient in the office today.  My personal interpretation of the performed imaging: X-ray of the left knee from 9/16/2021 reveals advanced medial compartment degenerative changes, moderate lateral compartment degenerative changes    Large Joint Injection/Arthocentesis: L knee joint    Date/Time: 1/5/2024 2:32 PM    Performed by: Suzette Mcwilliams DO  Authorized by: Suzette Mcwilliams DO    Indications:  Pain and osteoarthritis  Needle " Size:  22 G  Guidance: ultrasound    Approach:  Anterolateral  Location:  Knee      Medications:  40 mg triamcinolone 40 MG/ML; 3 mL lidocaine 1 %; 3 mL ROPivacaine 5 MG/ML  Medications comment:  1ml of 8.4% Sodium Bicarbonate solution was used to buffer the local numbing agent for today's injection    Outcome:  Tolerated well, no immediate complications  Procedure discussed: discussed risks, benefits, and alternatives    Consent Given by:  Patient  Timeout: timeout called immediately prior to procedure    Prep: patient was prepped and draped in usual sterile fashion     Ultrasound was used to ensure safe and accurate needle placement and injection. Ultrasound images of the procedure were permanently stored.                     Again, thank you for allowing me to participate in the care of your patient.        Sincerely,        Suzette Mcwilliams, DO

## 2024-01-05 NOTE — PROGRESS NOTES
ASSESSMENT & PLAN    Jennifer was seen today for pain.    Diagnoses and all orders for this visit:    Localized osteoarthritis of left knee  -     Large Joint Injection/Arthocentesis: L knee joint    Chronic pain of left knee  -     Orthopedic  Referral    Bilateral primary osteoarthritis of knee  -     Cancel: XR Knee Left 3 Views; Future    Rheumatoid arthritis involving multiple sites with positive rheumatoid factor (H)      74-year-old female presents for evaluation of left knee pain ongoing for many years secondary to osteoarthritis.  Reviewed prior history and laboratory values and she does have a history of + RF but has not seen rheumatology.  She does not report any systemic joint pain or swelling today but does have ongoing left knee pain that responded well to previous steroid injection with approximately 1 year of good relief.    Plan:  - Repeat left knee steroid injection in clinic today.  If diminishing relief, could consider trial of hyaluronic acid injection in the future.  Discussed that if she continues to have 1 year of pain relief from her injections, we can continue to do these   - Will repeat left knee x-ray today  - Start home exercise program, provided in clinic today  - Tylenol Extra Strength (1000mg) up to three times daily as needed for pain  -Denies any symptoms of inflammatory arthritis today, however would consider formal rheumatology referral in the future if symptoms recur      Return if symptoms worsen or fail to improve.      Dr. Suzette Mcwilliams, DO  [facility]  Sports Medicine     -----  Chief Complaint   Patient presents with    Left Knee - Pain       SUBJECTIVE  Jennifer Stinson is a/an 74 year old female who is seen in consultation at the request of  Erika Luis M.D. for evaluation of left knee pain.  Onset was 2 years ago. Pain is located left medial and lateral knee. Symptoms are worsened by walking, bending.  She has tried cortisone injection  "which provided good relief, cold and hot water. Associated symptoms include intermittent Swelling in bilateral lower legs.    The patient is seen with daughter, who helps provide the history    Prior injury/Surgical history of affected joint: no  Social History/Occupation: retired    REVIEW OF SYSTEMS:  Pertinent positives/negative: As stated above in HPI    OBJECTIVE:  BP (!) 140/84   Ht 1.676 m (5' 6\")   Wt 89.4 kg (197 lb)   LMP  (LMP Unknown)   BMI 31.80 kg/m     General: Alert and in no distress  Skin: no visable rashes  CV: Extremities appear well perfused   Resp: normal respiratory effort, no conversational dyspnea   Psych: normal mood, affect  MSK:  LEFT KNEE  Inspection:    Normal alignment; no edema, erythema, or ecchymosis present  Palpation:    Tender about the lateral patellar facet, lateral joint line, and medial joint line. Remainder of bony and ligamentous landmarks are nontender.    Small effusion is present    Patellofemoral crepitus is Present  Range of Motion:     00 extension to 1200 flexion  Strength:    Extensor mechanism intact      RADIOLOGY:  Final results and radiologist's interpretation available in the [facility] health record.  Images below were personally reviewed and discussed with the patient in the office today.  My personal interpretation of the performed imaging: X-ray of the left knee from 9/16/2021 reveals advanced medial compartment degenerative changes, moderate lateral compartment degenerative changes    Large Joint Injection/Arthocentesis: L knee joint    Date/Time: 1/5/2024 2:32 PM    Performed by: Suzette Mcwilliams DO  Authorized by: Suzette Mcwilliams DO    Indications:  Pain and osteoarthritis  Needle Size:  22 G  Guidance: ultrasound    Approach:  Anterolateral  Location:  Knee      Medications:  40 mg triamcinolone 40 MG/ML; 3 mL lidocaine 1 %; 3 mL ROPivacaine 5 MG/ML  Medications comment:  1ml of 8.4% Sodium Bicarbonate solution was used to buffer the local " numbing agent for today's injection    Outcome:  Tolerated well, no immediate complications  Procedure discussed: discussed risks, benefits, and alternatives    Consent Given by:  Patient  Timeout: timeout called immediately prior to procedure    Prep: patient was prepped and draped in usual sterile fashion     Ultrasound was used to ensure safe and accurate needle placement and injection. Ultrasound images of the procedure were permanently stored.

## 2024-01-07 DIAGNOSIS — K21.9 GASTROESOPHAGEAL REFLUX DISEASE WITHOUT ESOPHAGITIS: ICD-10-CM

## 2024-01-10 RX ORDER — SUCRALFATE 1 G/1
1 TABLET ORAL 4 TIMES DAILY
Qty: 60 TABLET | Refills: 0 | Status: SHIPPED | OUTPATIENT
Start: 2024-01-10 | End: 2024-02-01

## 2024-02-01 ENCOUNTER — MYC REFILL (OUTPATIENT)
Dept: INTERNAL MEDICINE | Facility: CLINIC | Age: 74
End: 2024-02-01
Payer: MEDICARE

## 2024-02-01 DIAGNOSIS — I10 ESSENTIAL HYPERTENSION WITH GOAL BLOOD PRESSURE LESS THAN 140/90: ICD-10-CM

## 2024-02-01 DIAGNOSIS — K21.9 GASTROESOPHAGEAL REFLUX DISEASE WITHOUT ESOPHAGITIS: ICD-10-CM

## 2024-02-01 RX ORDER — LISINOPRIL 40 MG/1
TABLET ORAL
Qty: 90 TABLET | Refills: 3 | Status: SHIPPED | OUTPATIENT
Start: 2024-02-01

## 2024-02-01 RX ORDER — AMLODIPINE BESYLATE 10 MG/1
10 TABLET ORAL DAILY
Qty: 90 TABLET | Refills: 3 | Status: SHIPPED | OUTPATIENT
Start: 2024-02-01

## 2024-02-01 RX ORDER — SUCRALFATE 1 G/1
1 TABLET ORAL 4 TIMES DAILY
Qty: 60 TABLET | Refills: 0 | Status: SHIPPED | OUTPATIENT
Start: 2024-02-01 | End: 2024-03-07

## 2024-02-17 ENCOUNTER — HEALTH MAINTENANCE LETTER (OUTPATIENT)
Age: 74
End: 2024-02-17

## 2024-02-29 ENCOUNTER — PATIENT OUTREACH (OUTPATIENT)
Dept: GERIATRIC MEDICINE | Facility: CLINIC | Age: 74
End: 2024-02-29
Payer: MEDICARE

## 2024-02-29 NOTE — PROGRESS NOTES
[facility] Care Coordination Contact      [facility] Six-Month Telephone Assessment    6 month telephone assessment completed on 2/29/2024.    ER visits: No  Hospitalizations: No  TCU stays: No  Significant health status changes: DaughterCindy reports some need rash/bumps since making a recent medication change. They have scheduled a follow up with PCP to discuss.   Falls/Injuries: No  ADL/IADL changes: No  Changes in services: No, services continue unchanged. Jennifer continues to receive PCA daily as well as homemaker for weekly.   Discussed switching Jennifer to [employer] insurance so that she can begin to take advantage of some of the incentive programs available including Healthy Savings. Daughter is interested. CC contact [employer] and requested an application be mailed.     Caregiver Assessment follow up:  n/a    Goals: See POC in chart for goal progress documentation.      Will see member in 6 months for an annual health risk assessment.   Encouraged member to call CC with any questions or concerns in the meantime.     MATT Young  [facility]  [phone]

## 2024-03-07 ENCOUNTER — OFFICE VISIT (OUTPATIENT)
Dept: INTERNAL MEDICINE | Facility: CLINIC | Age: 74
End: 2024-03-07
Payer: MEDICARE

## 2024-03-07 VITALS
RESPIRATION RATE: 18 BRPM | WEIGHT: 205.9 LBS | HEART RATE: 107 BPM | BODY MASS INDEX: 33.09 KG/M2 | SYSTOLIC BLOOD PRESSURE: 124 MMHG | HEIGHT: 66 IN | OXYGEN SATURATION: 97 % | TEMPERATURE: 97.5 F | DIASTOLIC BLOOD PRESSURE: 78 MMHG

## 2024-03-07 DIAGNOSIS — R53.83 OTHER FATIGUE: ICD-10-CM

## 2024-03-07 DIAGNOSIS — I10 ESSENTIAL HYPERTENSION: Primary | ICD-10-CM

## 2024-03-07 DIAGNOSIS — E78.5 HYPERLIPIDEMIA WITH TARGET LDL LESS THAN 130: ICD-10-CM

## 2024-03-07 DIAGNOSIS — M85.89 OTHER SPECIFIED DISORDERS OF BONE DENSITY AND STRUCTURE, MULTIPLE SITES: ICD-10-CM

## 2024-03-07 DIAGNOSIS — E03.8 OTHER SPECIFIED HYPOTHYROIDISM: ICD-10-CM

## 2024-03-07 DIAGNOSIS — K14.8 TONGUE LESION: ICD-10-CM

## 2024-03-07 DIAGNOSIS — K21.9 GASTROESOPHAGEAL REFLUX DISEASE WITHOUT ESOPHAGITIS: ICD-10-CM

## 2024-03-07 LAB
ERYTHROCYTE [DISTWIDTH] IN BLOOD BY AUTOMATED COUNT: 15.1 % (ref 10–15)
HCT VFR BLD AUTO: 43.9 % (ref 35–47)
HGB BLD-MCNC: 15 G/DL (ref 11.7–15.7)
MCH RBC QN AUTO: 31 PG (ref 26.5–33)
MCHC RBC AUTO-ENTMCNC: 34.2 G/DL (ref 31.5–36.5)
MCV RBC AUTO: 91 FL (ref 78–100)
PLATELET # BLD AUTO: 149 10E3/UL (ref 150–450)
RBC # BLD AUTO: 4.84 10E6/UL (ref 3.8–5.2)
WBC # BLD AUTO: 7.9 10E3/UL (ref 4–11)

## 2024-03-07 PROCEDURE — 84443 ASSAY THYROID STIM HORMONE: CPT | Performed by: INTERNAL MEDICINE

## 2024-03-07 PROCEDURE — 85027 COMPLETE CBC AUTOMATED: CPT | Performed by: INTERNAL MEDICINE

## 2024-03-07 PROCEDURE — 80053 COMPREHEN METABOLIC PANEL: CPT | Performed by: INTERNAL MEDICINE

## 2024-03-07 PROCEDURE — 82306 VITAMIN D 25 HYDROXY: CPT | Performed by: INTERNAL MEDICINE

## 2024-03-07 PROCEDURE — 36415 COLL VENOUS BLD VENIPUNCTURE: CPT | Performed by: INTERNAL MEDICINE

## 2024-03-07 PROCEDURE — 99215 OFFICE O/P EST HI 40 MIN: CPT | Performed by: INTERNAL MEDICINE

## 2024-03-07 NOTE — LETTER
March 18, 2024      Jennifer Stinson  [address]  [place] [address]        Dear ,    We are writing to inform you of your test results.    Please schedule appointment to review lab results including elevated liver enzyme levels.     Resulted Orders   TSH WITH FREE T4 REFLEX   Result Value Ref Range    TSH 3.68 0.30 - 4.20 uIU/mL   Comprehensive metabolic panel   Result Value Ref Range    Sodium 139 135 - 145 mmol/L      Comment:      Reference intervals for this test were updated on 09/26/2023 to more accurately reflect our healthy population. There may be differences in the flagging of prior results with similar values performed with this method. Interpretation of those prior results can be made in the context of the updated reference intervals.     Potassium 4.5 3.4 - 5.3 mmol/L    Carbon Dioxide (CO2) 30 (H) 22 - 29 mmol/L    Anion Gap 6 (L) 7 - 15 mmol/L    Urea Nitrogen 10.6 8.0 - 23.0 mg/dL    Creatinine 0.62 0.51 - 0.95 mg/dL    GFR Estimate >90 >60 mL/min/1.73m2    Calcium 8.9 8.8 - 10.2 mg/dL    Chloride 103 98 - 107 mmol/L    Glucose 88 70 - 99 mg/dL    Alkaline Phosphatase 308 (H) 40 - 150 U/L      Comment:      Reference intervals for this test were updated on 11/14/2023 to more accurately reflect our healthy population. There may be differences in the flagging of prior results with similar values performed with this method. Interpretation of those prior results can be made in the context of the updated reference intervals.     (H) 0 - 45 U/L      Comment:      Reference intervals for this test were updated on 6/12/2023 to more accurately reflect our healthy population. There may be differences in the flagging of prior results with similar values performed with this method. Interpretation of those prior results can be made in the context of the updated reference intervals.    ALT 94 (H) 0 - 50 U/L      Comment:      Reference intervals for this test were updated on 6/12/2023 to  more accurately reflect our healthy population. There may be differences in the flagging of prior results with similar values performed with this method. Interpretation of those prior results can be made in the context of the updated reference intervals.      Protein Total 6.9 6.4 - 8.3 g/dL    Albumin 3.3 (L) 3.5 - 5.2 g/dL    Bilirubin Total 0.7 <=1.2 mg/dL   Vitamin D Deficiency   Result Value Ref Range    Vitamin D, Total (25-Hydroxy) 31 20 - 50 ng/mL      Comment:      optimum levels    Narrative    Season, race, dietary intake, and treatment affect the concentration of 25-hydroxy-Vitamin D. Values may decrease during winter months and increase during summer months.    Vitamin D determination is routinely performed by an immunoassay specific for 25 hydroxyvitamin D3.  If an individual is on vitamin D2(ergocalciferol) supplementation, please specify 25 OH vitamin D2 and D3 level determination by LCMSMS test VITD23.     CBC with platelets   Result Value Ref Range    WBC Count 7.9 4.0 - 11.0 10e3/uL    RBC Count 4.84 3.80 - 5.20 10e6/uL    Hemoglobin 15.0 11.7 - 15.7 g/dL    Hematocrit 43.9 35.0 - 47.0 %    MCV 91 78 - 100 fL    MCH 31.0 26.5 - 33.0 pg    MCHC 34.2 31.5 - 36.5 g/dL    RDW 15.1 (H) 10.0 - 15.0 %    Platelet Count 149 (L) 150 - 450 10e3/uL       If you have any questions or concerns, please call the clinic at the number listed above.       Sincerely,      Erika Luis MD

## 2024-03-07 NOTE — PROGRESS NOTES
"  Assessment & Plan     Essential hypertension  Blood pressure reviewed.  Within target.  Target of less than 140/90.  Continue current antihypertensive regimen.  Update electrolytes/creatinine check.  - Comprehensive metabolic panel; Future  - Comprehensive metabolic panel    Hyperlipidemia with target LDL less than 130  Continues on Lipitor medication at 40 mg daily.  Tolerating medication well.    Other specified hypothyroidism  Update thyroid lab work.  Clinically stable.  - TSH WITH FREE T4 REFLEX; Future  - TSH WITH FREE T4 REFLEX    Gastroesophageal reflux disease without esophagitis  Clinically stable.  Continues on omeprazole medication.    Other fatigue  - Vitamin D Deficiency; Future  - CBC with platelets; Future  - Vitamin D Deficiency  - CBC with platelets    Tongue lesion  Lesion noted on the border of the tongue without redness or tenderness.  Not surrounded by erythema.  Patient does endorse irritation when eating.  Has been present for around 1 month.  - Adult ENT  Referral; Future    Other specified disorders of bone density and structure, multiple sites    - Vitamin D Deficiency; Future  - Vitamin D Deficiency      41 minutes spent by me on the date of the encounter doing chart review, history and exam, documentation and further activities per the note      BMI  Estimated body mass index is 33.23 kg/m  as calculated from the following:    Height as of this encounter: 1.676 m (5' 6\").    Weight as of this encounter: 93.4 kg (205 lb 14.4 oz).             Grayson Rodriguez is a 74 year old, presenting for the following health issues:  Follow Up        3/7/2024     2:20 PM   Additional Questions   Roomed by Isai   Accompanied by Daughter      History of Present Illness       Hyperlipidemia:  She presents for follow up of hyperlipidemia.   She is taking medication to lower cholesterol. She is not having myalgia or other side effects to statin medications.    Hypertension: She presents for " "follow up of hypertension.  She does check blood pressure  regularly outside of the clinic. Outside blood pressures have been over 140/90. She does not follow a low salt diet.     Hypothyroidism:     Since last visit, patient describes the following symptoms::  None    She eats 0-1 servings of fruits and vegetables daily.She consumes 0 sweetened beverage(s) daily.She exercises with enough effort to increase her heart rate 9 or less minutes per day.  She exercises with enough effort to increase her heart rate 3 or less days per week.   She is taking medications regularly.           Review of Systems  Constitutional, HEENT, cardiovascular, pulmonary, gi and gu systems are negative, except as otherwise noted.      Objective    /78 (BP Location: Right arm, Patient Position: Sitting, Cuff Size: Adult Regular)   Pulse 107   Temp 97.5  F (36.4  C) (Tympanic)   Resp 18   Ht 1.676 m (5' 6\")   Wt 93.4 kg (205 lb 14.4 oz)   LMP  (LMP Unknown)   SpO2 97%   BMI 33.23 kg/m    Body mass index is 33.23 kg/m .  Physical Exam   GENERAL: alert and no distress  HENT: Lesion noted on the border of the tongue without redness or tenderness.  Not surrounded by erythema.   RESP: lungs clear to auscultation - no rales, rhonchi or wheezes  CV: regular rate and rhythm, normal S1 S2, no S3 or S4, no murmur, click or rub, no peripheral edema  MS: no gross musculoskeletal defects noted, no edema  NEURO: Normal strength and tone, mentation intact and speech normal  PSYCH: mentation appears normal, affect normal/bright            Signed Electronically by: Erika Luis MD    "

## 2024-03-08 LAB
ALBUMIN SERPL BCG-MCNC: 3.3 G/DL (ref 3.5–5.2)
ALP SERPL-CCNC: 308 U/L (ref 40–150)
ALT SERPL W P-5'-P-CCNC: 94 U/L (ref 0–50)
ANION GAP SERPL CALCULATED.3IONS-SCNC: 6 MMOL/L (ref 7–15)
AST SERPL W P-5'-P-CCNC: 116 U/L (ref 0–45)
BILIRUB SERPL-MCNC: 0.7 MG/DL
BUN SERPL-MCNC: 10.6 MG/DL (ref 8–23)
CALCIUM SERPL-MCNC: 8.9 MG/DL (ref 8.8–10.2)
CHLORIDE SERPL-SCNC: 103 MMOL/L (ref 98–107)
CREAT SERPL-MCNC: 0.62 MG/DL (ref 0.51–0.95)
DEPRECATED HCO3 PLAS-SCNC: 30 MMOL/L (ref 22–29)
EGFRCR SERPLBLD CKD-EPI 2021: >90 ML/MIN/1.73M2
GLUCOSE SERPL-MCNC: 88 MG/DL (ref 70–99)
POTASSIUM SERPL-SCNC: 4.5 MMOL/L (ref 3.4–5.3)
PROT SERPL-MCNC: 6.9 G/DL (ref 6.4–8.3)
SODIUM SERPL-SCNC: 139 MMOL/L (ref 135–145)
TSH SERPL DL<=0.005 MIU/L-ACNC: 3.68 UIU/ML (ref 0.3–4.2)
VIT D+METAB SERPL-MCNC: 31 NG/ML (ref 20–50)

## 2024-03-12 RX ORDER — MULTIVITAMIN
1 TABLET ORAL DAILY
Qty: 90 TABLET | Refills: 3 | Status: SHIPPED | OUTPATIENT
Start: 2024-03-12

## 2024-03-26 ENCOUNTER — TELEPHONE (OUTPATIENT)
Dept: INTERNAL MEDICINE | Facility: CLINIC | Age: 74
End: 2024-03-26
Payer: MEDICARE

## 2024-03-26 ENCOUNTER — MYC MEDICAL ADVICE (OUTPATIENT)
Dept: INTERNAL MEDICINE | Facility: CLINIC | Age: 74
End: 2024-03-26
Payer: MEDICARE

## 2024-03-26 NOTE — TELEPHONE ENCOUNTER
Sent patient a [employer] message with information about her upcoming appointment per patient's request.

## 2024-03-26 NOTE — TELEPHONE ENCOUNTER
Sent patient a [employer] message with information about her upcoming appointment per patient's request.

## 2024-04-02 ENCOUNTER — OFFICE VISIT (OUTPATIENT)
Dept: INTERNAL MEDICINE | Facility: CLINIC | Age: 74
End: 2024-04-02
Payer: MEDICARE

## 2024-04-02 VITALS
OXYGEN SATURATION: 96 % | WEIGHT: 205.7 LBS | BODY MASS INDEX: 33.06 KG/M2 | DIASTOLIC BLOOD PRESSURE: 76 MMHG | RESPIRATION RATE: 18 BRPM | HEIGHT: 66 IN | HEART RATE: 113 BPM | TEMPERATURE: 98.9 F | SYSTOLIC BLOOD PRESSURE: 128 MMHG

## 2024-04-02 DIAGNOSIS — K21.9 GASTROESOPHAGEAL REFLUX DISEASE WITHOUT ESOPHAGITIS: ICD-10-CM

## 2024-04-02 DIAGNOSIS — R10.84 GENERALIZED ABDOMINAL PAIN: ICD-10-CM

## 2024-04-02 DIAGNOSIS — R74.8 ELEVATED LIVER ENZYMES: Primary | ICD-10-CM

## 2024-04-02 DIAGNOSIS — Z11.59 ENCOUNTER FOR SCREENING FOR OTHER VIRAL DISEASES: ICD-10-CM

## 2024-04-02 DIAGNOSIS — E78.5 HYPERLIPIDEMIA WITH TARGET LDL LESS THAN 130: ICD-10-CM

## 2024-04-02 DIAGNOSIS — Z11.59 NEED FOR HEPATITIS C SCREENING TEST: ICD-10-CM

## 2024-04-02 PROCEDURE — 86803 HEPATITIS C AB TEST: CPT | Performed by: INTERNAL MEDICINE

## 2024-04-02 PROCEDURE — 86706 HEP B SURFACE ANTIBODY: CPT | Performed by: INTERNAL MEDICINE

## 2024-04-02 PROCEDURE — 80061 LIPID PANEL: CPT | Performed by: INTERNAL MEDICINE

## 2024-04-02 PROCEDURE — 99214 OFFICE O/P EST MOD 30 MIN: CPT | Performed by: INTERNAL MEDICINE

## 2024-04-02 PROCEDURE — 82977 ASSAY OF GGT: CPT | Performed by: INTERNAL MEDICINE

## 2024-04-02 PROCEDURE — 36415 COLL VENOUS BLD VENIPUNCTURE: CPT | Performed by: INTERNAL MEDICINE

## 2024-04-02 PROCEDURE — 87340 HEPATITIS B SURFACE AG IA: CPT | Performed by: INTERNAL MEDICINE

## 2024-04-02 ASSESSMENT — PAIN SCALES - GENERAL: PAINLEVEL: NO PAIN (0)

## 2024-04-02 NOTE — PROGRESS NOTES
"  Assessment & Plan     Elevated liver enzymes  Differentials discussed with the patient and the daughter including and not limited to possible underlying viral concerns versus nonalcoholic hepatic steatosis.  Complete lab work including hepatitis B and hepatitis C screening.  Complete abdominal ultrasound.  Manager referral placed.  - Hepatitis B Surface Antibody; Future  - Hepatitis B surface antigen; Future  - US Abdomen Complete; Future  - GGT; Future  - Adult GI  Referral - Consult Only; Future  - Hepatitis B Surface Antibody  - Hepatitis B surface antigen  - GGT    Gastroesophageal reflux disease without esophagitis  - Adult GI  Referral - Consult Only; Future    Generalized abdominal pain  - Adult GI  Referral - Consult Only; Future    Hyperlipidemia with target LDL less than 130  - Lipid panel reflex to direct LDL Fasting; Future  - Lipid panel reflex to direct LDL Fasting    Need for hepatitis C screening test  - Hepatitis C Screen Reflex to HCV RNA Quant and Genotype; Future  - Hepatitis C Screen Reflex to HCV RNA Quant and Genotype    Encounter for screening for other viral diseases  - Hepatitis B Surface Antibody; Future  - Hepatitis B surface antigen; Future  - Hepatitis B Surface Antibody  - Hepatitis B surface antigen        BMI  Estimated body mass index is 33.2 kg/m  as calculated from the following:    Height as of this encounter: 1.676 m (5' 6\").    Weight as of this encounter: 93.3 kg (205 lb 11.2 oz).             Grayson Rodriguez is a 74 year old, presenting for the following health issues:  Results        4/2/2024     9:59 AM   Additional Questions   Roomed by Isai   Accompanied by In-person interrater and Daughter      History of Present Illness       Reason for visit:  Test results    She eats 0-1 servings of fruits and vegetables daily.She consumes 0 sweetened beverage(s) daily.She exercises with enough effort to increase her heart rate 9 or less minutes per day.  " "She exercises with enough effort to increase her heart rate 3 or less days per week.   She is taking medications regularly.                 Review of Systems  Constitutional, HEENT, cardiovascular, pulmonary, gi and gu systems are negative, except as otherwise noted.      Objective    /76 (BP Location: Right arm, Patient Position: Sitting, Cuff Size: Adult Large)   Pulse 113   Temp 98.9  F (37.2  C) (Tympanic)   Resp 18   Ht 1.676 m (5' 6\")   Wt 93.3 kg (205 lb 11.2 oz)   LMP  (LMP Unknown)   SpO2 96%   BMI 33.20 kg/m    Body mass index is 33.2 kg/m .  Physical Exam   GENERAL: alert and no distress  RESP: lungs clear to auscultation - no rales, rhonchi or wheezes  CV: regular rate and rhythm, normal S1 S2  ABDOMEN: soft, nontender, no hepatosplenomegaly, no masses  MS: no gross musculoskeletal defects noted, no edema  NEURO: Normal strength and tone, mentation intact and speech normal  PSYCH: mentation appears normal, affect normal/bright            Signed Electronically by: Erika Luis MD    "

## 2024-04-03 LAB
CHOLEST SERPL-MCNC: 143 MG/DL
FASTING STATUS PATIENT QL REPORTED: YES
GGT SERPL-CCNC: 410 U/L (ref 5–36)
HBV SURFACE AB SERPL IA-ACNC: 13.8 M[IU]/ML
HBV SURFACE AB SERPL IA-ACNC: REACTIVE M[IU]/ML
HBV SURFACE AG SERPL QL IA: NONREACTIVE
HCV AB SERPL QL IA: NONREACTIVE
HDLC SERPL-MCNC: 42 MG/DL
LDLC SERPL CALC-MCNC: 87 MG/DL
NONHDLC SERPL-MCNC: 101 MG/DL
TRIGL SERPL-MCNC: 72 MG/DL

## 2024-04-19 ENCOUNTER — HOSPITAL ENCOUNTER (OUTPATIENT)
Dept: ULTRASOUND IMAGING | Facility: CLINIC | Age: 74
Discharge: HOME | End: 2024-04-19
Attending: INTERNAL MEDICINE | Admitting: INTERNAL MEDICINE
Payer: MEDICARE

## 2024-04-19 DIAGNOSIS — R74.8 ELEVATED LIVER ENZYMES: ICD-10-CM

## 2024-04-19 PROCEDURE — 76700 US EXAM ABDOM COMPLETE: CPT

## 2024-04-22 ENCOUNTER — TRANSFERRED RECORDS (OUTPATIENT)
Dept: HEALTH INFORMATION MANAGEMENT | Facility: CLINIC | Age: 74
End: 2024-04-22
Payer: MEDICARE

## 2024-04-22 LAB — INR (EXTERNAL): 1.1 (ref 0.9–1.2)

## 2024-04-24 DIAGNOSIS — R07.9 CHEST PAIN, UNSPECIFIED TYPE: ICD-10-CM

## 2024-04-24 RX ORDER — ATORVASTATIN CALCIUM 40 MG/1
40 TABLET, FILM COATED ORAL DAILY
Qty: 90 TABLET | Refills: 2 | Status: SHIPPED | OUTPATIENT
Start: 2024-04-24

## 2024-05-02 ENCOUNTER — TRANSFERRED RECORDS (OUTPATIENT)
Dept: HEALTH INFORMATION MANAGEMENT | Facility: CLINIC | Age: 74
End: 2024-05-02
Payer: MEDICARE

## 2024-05-06 ENCOUNTER — MYC REFILL (OUTPATIENT)
Dept: INTERNAL MEDICINE | Facility: CLINIC | Age: 74
End: 2024-05-06
Payer: MEDICARE

## 2024-05-06 DIAGNOSIS — I10 ESSENTIAL HYPERTENSION WITH GOAL BLOOD PRESSURE LESS THAN 140/90: ICD-10-CM

## 2024-05-06 DIAGNOSIS — E03.8 OTHER SPECIFIED HYPOTHYROIDISM: ICD-10-CM

## 2024-05-06 RX ORDER — METOPROLOL SUCCINATE 50 MG/1
50 TABLET, EXTENDED RELEASE ORAL DAILY
Qty: 90 TABLET | Refills: 3 | OUTPATIENT
Start: 2024-05-06

## 2024-05-06 RX ORDER — LEVOTHYROXINE SODIUM 88 UG/1
88 TABLET ORAL DAILY
Qty: 90 TABLET | Refills: 3 | OUTPATIENT
Start: 2024-05-06

## 2024-05-06 NOTE — TELEPHONE ENCOUNTER
FUTURE VISIT INFORMATION      FUTURE VISIT INFORMATION:  Date: 5.28.24  Time: 9:00  Location: [facility]  REFERRAL INFORMATION:  Referring provider:  Janelle  Referring providers clinic:  [facility]  Reason for visit/diagnosis  Consult allergy  scan correct ucare card      RECORDS REQUESTED FROM:       Clinic name Comments Records Status Imaging Status   [facility] 11.8.23  [facility]    Allergy 12.15.22  [facility]

## 2024-05-22 ENCOUNTER — TELEPHONE (OUTPATIENT)
Dept: DERMATOLOGY | Facility: CLINIC | Age: 74
End: 2024-05-22
Payer: MEDICARE

## 2024-05-22 NOTE — TELEPHONE ENCOUNTER
Called and spoke with pt's daughter. Pt is out of town so we had to reschedule.     Agustina Desai, Complex  5/22/2024 1:58 PM

## 2024-05-28 ENCOUNTER — PRE VISIT (OUTPATIENT)
Dept: ALLERGY | Facility: CLINIC | Age: 74
End: 2024-05-28

## 2024-07-01 ENCOUNTER — PATIENT OUTREACH (OUTPATIENT)
Dept: GERIATRIC MEDICINE | Facility: CLINIC | Age: 74
End: 2024-07-01
Payer: MEDICARE

## 2024-07-01 NOTE — PROGRESS NOTES
[facility] Care Coordination Contact    Called member to schedule annual HRA home visit. Left a message requesting a return call to schedule HRA.    WILFREDO Pablo, Manager   [facility]  [phone]

## 2024-07-02 NOTE — PROGRESS NOTES
[facility] Care Coordination Contact    Called adult daughter Cindy  to schedule annual HRA home visit. HRA has been scheduled for 7/15/24 at 3pm.    WILFREDO Pablo, Manager   [facility]  [phone]

## 2024-07-15 ENCOUNTER — PATIENT OUTREACH (OUTPATIENT)
Dept: GERIATRIC MEDICINE | Facility: CLINIC | Age: 74
End: 2024-07-15
Payer: MEDICARE

## 2024-07-15 NOTE — Clinical Note
We are the [facility] care coordinators for Jennifer through her [employer] insurance.  A visit was completed on Monday to renew her services.  EULALIAI.   WILFREDO Pablo, Manager  [facility] [phone]

## 2024-07-22 ENCOUNTER — PATIENT OUTREACH (OUTPATIENT)
Dept: GERIATRIC MEDICINE | Facility: CLINIC | Age: 74
End: 2024-07-22
Payer: MEDICARE

## 2024-07-22 NOTE — LETTER
July 22, 2024       Jennifer Stinson  [address] [place] [address]      Dear Jennifer,    At [facility], we re dedicated to improving your health and wellness. Enclosed is the Support Plan developed with you on 7/15/2024. Please review the Support Plan carefully.    As a reminder, during your visit we talked about:   Ways to manage your physical and mental health   Using health care to maintain and improve your health    Your preventive care needs      Remember to contact your care coordinator if you:   Are hospitalized or plan to be hospitalized    Have a fall     Have a change in your physical or mental health   Need help finding support or services    If you have questions or don t agree with your Support Plan, call me at [phone]. You can also call me if your needs change. TTY users call the [place] Relay at 589 or 1-577.476.9449 (dipscp-ii-pgxzbh relay service).    Sincerely,       WILFREDO Pablo    E-mail: [email or web address]  Phone: [phone]      [employer]                [id number] accepted     (06/2024)                [address], [place] [address]  [phone]  fax [phone]  [facility]

## 2024-07-22 NOTE — PROGRESS NOTES
[facility] Care Coordination Contact    Received after visit chart from care coordinator.  Completed following tasks: Mailed copy of care plan/support plan to member, Mailed [place] Cho[place] signature sheet pages 3-4, Mailed Safe Medication Disposal , Submitted referrals/auths for hmkg, DME, and Updated services in Database  , Provider Signature - No POC Shared:  Member indicates that they do not want their POC shared with any [facility] providers.    [employer]:  Emailed required PCA documents to [employer].  Faxed copy of PCA assessment to PCA Agency and mailed copy to member.  Faxed MD Communication to PCP.   , and Order placed with [facility] (p: [phone]; f: [phone]) for twice daily pill box, set of adaptive silverware, and bed rail with install.  Order placed on 7/22/2024. Database updated.  As required, authorization submitted to health plan.    Polina Frazier  Care Management Specialist  [facility]  [phone]

## 2024-08-05 ENCOUNTER — PATIENT OUTREACH (OUTPATIENT)
Dept: GERIATRIC MEDICINE | Facility: CLINIC | Age: 74
End: 2024-08-05
Payer: MEDICARE

## 2024-08-05 NOTE — PROGRESS NOTES
[facility] Care Coordination Contact     CHW, called and spoke w/ Mbr to remind to schedule Colonoscopy, Mammogram, and Wellness exam(s). Mbr Noted she will have Dtr  call back to coordinate.  Left contact info.           LAVON Porter  [facility]  [phone]

## 2024-08-08 ENCOUNTER — PATIENT OUTREACH (OUTPATIENT)
Dept: GERIATRIC MEDICINE | Facility: CLINIC | Age: 74
End: 2024-08-08
Payer: MEDICARE

## 2024-08-08 ENCOUNTER — TELEPHONE (OUTPATIENT)
Dept: INTERNAL MEDICINE | Facility: CLINIC | Age: 74
End: 2024-08-08
Payer: MEDICARE

## 2024-08-08 NOTE — LETTER
August 8, 2024    JACKELYN IRBY  [address] [place] [place] [address]      Dear Jackelyn,    Welcome to [facility] health program. My name is MATT Young. I am your [facility] care coordinator. You're eligible for care coordination through your [facility] Product.    As your care coordinator, we ll:  Meet to go over your care coordination benefits  Talk about your physical and mental health care needs   Review your preventative care needs  Create a plan that meets your needs with the services you choose    What happens next?  I ll call you soon to introduce myself and tell you more about my role. We ll then plan time to go over your health and safety needs. Our goal is to keep you as healthy and independent as possible.    [facility] combines the benefits you may already have receive from Medical Assistance, Medicare and the Prescription Drug Coverage Program. Soon you'll receive a new member identification (ID) card from [facility]. Use this card whenever you get health services.    The [facility] care coordination program is voluntary and offered to you at no cost. If you wish to stop being in the care coordination program or have questions, call me at [phone]. If you reach my voicemail, leave a message and your phone number. TTY users, call the [place] Relay at 002 or 1-936.840.6305 (ciesod-zw-xxtrxp relay service).    Sincerely,    MATT Young  [phone]  [email or web address] ([email or web address]) is a health plan that contracts with both Medicare and the [place] Medical Assistance (Medicaid) program to provide benefits of both programs to enrollees. Enrollment in [facility] depends on contract renewal.    [id number] accepted   [id number]         [id number] (07/2022)

## 2024-08-08 NOTE — PROGRESS NOTES
[facility] Care Coordination Contact    Member changed health plan products from [facility] to are [facility] effective 8/1/2024. CC to complete items on Product Change/ Health Plan Change check list including MMIS entry. CMS mailed Welcome Letter, supporting documents, [place] and other required tasks.    Polina Frazier  Care Management Specialist  [facility]  [phone]

## 2024-08-08 NOTE — LETTER
August 8, 2024    To  Jennifer Stinson  [address] [place] [address]    Your team at [facility] cares about your health. We have reviewed your chart and based on our findings; we are making the following recommendations to better manage your health.     You are in particular need of attention regarding the following:     PREVENTATIVE VISIT: Annual Medicare Wellness:Schedule an Annual Medicare Wellness Exam. Please call your [facility] clinic to set up your appointment.    If you have already completed these items, please contact the clinic via phone or   MyChart so your care team can review and update your records. Thank you for   choosing [facility] for your healthcare needs. For any questions,   concerns, or to schedule an appointment please contact our clinic.    Healthy Regards,      Your [facility] Care Team

## 2024-08-08 NOTE — TELEPHONE ENCOUNTER
Patient Quality Outreach    Patient is due for the following:   Breast Cancer Screening - Mammogram  Physical Annual Wellness Visit      Topic Date Due    Zoster (Shingles) Vaccine (1 of 2) Never done    Pneumococcal Vaccine (1 of 1 - PCV) Never done    COVID-19 Vaccine (3 - 2023-24 season) 09/01/2023       Next Steps:   Schedule a Annual Wellness Visit    Type of outreach:    Sent letter.      Questions for provider review:    None           Flavio Geradr MA  Chart routed to none.

## 2024-08-13 NOTE — PROGRESS NOTES
[facility] Care Coordination Contact      [facility] Health Plan or Product Change    CC received notification that member's health plan or health plan product changed from [employer] to [employer] [facility] effective 8/1/2024.  CC contacted member and discussed change and face-to-face visit was offered. Member declined need for home visit. CC reviewed previous Health Risk Assessment/LTCC and POC with member and no changes noted.  Transitional HRA completed. Care Plan Summary updated and reflects current services.  Required referral authorization information communicated to CMS: No  Writer reviewed the following with member:  ER visits: No  Hospitalizations: No  TCU stays: No  Significant health status changes: No significant health changes.   Falls/Injuries: No  ADL/IADL changes: No  Changes in services: No    Follow-Up Plan: Member informed of future contact, plan to f/u with member with at next regularly scheduled contact.  Contact information shared with member and family, encouraged member to call with any questions or concerns.  MATT Young  [facility]  [phone]

## 2024-08-23 ENCOUNTER — TELEPHONE (OUTPATIENT)
Dept: ALLERGY | Facility: CLINIC | Age: 74
End: 2024-08-23
Payer: MEDICARE

## 2024-08-23 ENCOUNTER — APPOINTMENT (OUTPATIENT)
Dept: INTERPRETER SERVICES | Facility: CLINIC | Age: 74
End: 2024-08-23
Payer: MEDICARE

## 2024-08-23 NOTE — TELEPHONE ENCOUNTER
LVM for patient regarding rescheduling needed as provider is out of clinic.   Provided [facility] for rescheduling options. -Via  call

## 2024-09-27 NOTE — PROGRESS NOTES
Twice daily pill box, bed rail with install, set of adaptive silverware delivered on 9/26/24 per [facility], CC notified.    Polina Frazier  Care Management Specialist  [facility]  [phone]

## 2024-12-02 DIAGNOSIS — E03.8 OTHER SPECIFIED HYPOTHYROIDISM: ICD-10-CM

## 2024-12-02 DIAGNOSIS — I10 ESSENTIAL HYPERTENSION WITH GOAL BLOOD PRESSURE LESS THAN 140/90: ICD-10-CM

## 2024-12-02 RX ORDER — LEVOTHYROXINE SODIUM 88 UG/1
88 TABLET ORAL DAILY
Qty: 90 TABLET | Refills: 0 | Status: SHIPPED | OUTPATIENT
Start: 2024-12-02

## 2024-12-02 RX ORDER — METOPROLOL SUCCINATE 50 MG/1
50 TABLET, EXTENDED RELEASE ORAL DAILY
Qty: 90 TABLET | Refills: 0 | Status: SHIPPED | OUTPATIENT
Start: 2024-12-02

## 2024-12-12 ENCOUNTER — OFFICE VISIT (OUTPATIENT)
Dept: INTERNAL MEDICINE | Facility: CLINIC | Age: 74
End: 2024-12-12
Payer: COMMERCIAL

## 2024-12-12 VITALS
RESPIRATION RATE: 14 BRPM | HEIGHT: 66 IN | HEART RATE: 86 BPM | WEIGHT: 187.1 LBS | BODY MASS INDEX: 30.07 KG/M2 | SYSTOLIC BLOOD PRESSURE: 138 MMHG | DIASTOLIC BLOOD PRESSURE: 86 MMHG | OXYGEN SATURATION: 96 % | TEMPERATURE: 98.7 F

## 2024-12-12 DIAGNOSIS — M17.10 ARTHRITIS OF KNEE: Primary | ICD-10-CM

## 2024-12-12 NOTE — NURSING NOTE
"/86   Pulse 86   Temp 98.7  F (37.1  C) (Tympanic)   Resp 14   Ht 1.676 m (5' 6\")   Wt 84.9 kg (187 lb 1.6 oz)   LMP  (LMP Unknown)   SpO2 96%   BMI 30.20 kg/m      "

## 2024-12-12 NOTE — PROGRESS NOTES
Assessment & Plan     (M17.10) Arthritis of knee  (primary encounter diagnosis)  Plan:  Reviewed knee x-ray results from 01/24, advanced degenerative arthrosis bilateral knees, has had cortisone injection in the past, patient was referred to orthopedic specialist for further evaluation and management.  Advised over-the-counter Tylenol arthritis as needed,not to exceed 3000 mg/day.  Orthopedic  Referral          Subjective   Jennifer is a 74 year old, presenting for the following health issues:  Consult      12/12/2024     7:28 AM   Additional Questions   Roomed by brunilda   Accompanied by self         12/12/2024     7:28 AM   Patient Reported Additional Medications   Patient reports taking the following new medications none     History of Present Illness       Reason for visit:  Leg swelling, knee pain. Painful to walk    She eats 2-3 servings of fruits and vegetables daily.She consumes 0 sweetened beverage(s) daily.She exercises with enough effort to increase her heart rate 9 or less minutes per day.  She exercises with enough effort to increase her heart rate 3 or less days per week.   She is taking medications regularly.     Pt is a 74 year old female who is seen here to day with c/o  pain bilateral knees since few yrs, but worse in the last few months, also c/o swelling in both knees, takes baby aspirin and tylenol prn for pain.  Has pain with walking and also feels stiff.  Patient states that she is traveling out of country in 2 to 3 weeks and requesting cortisone injection to the knees.  Patient has seen orthopedics and did have cortisone injection to the left knee in 01/24 .  Has had bilateral knee x-rays which showed advanced degenerative arthrosis bilateral knees.    Past Medical History:   Diagnosis Date    GERD (gastroesophageal reflux disease)     Hyperlipidemia LDL goal < 130     Hypertension     Hypothyroidism     Stroke (H)      Current Outpatient Medications   Medication Sig Dispense Refill     "amLODIPine (NORVASC) 10 MG tablet Take 1 tablet (10 mg) by mouth daily No further refills until seen by provider 90 tablet 3    aspirin (ASA) 325 MG EC tablet Take 1 tablet (325 mg) by mouth daily 90 tablet 3    atorvastatin (LIPITOR) 40 MG tablet Take 1 tablet (40 mg) by mouth daily 90 tablet 2    levothyroxine (SYNTHROID/LEVOTHROID) 88 MCG tablet Take 1 tablet (88 mcg) by mouth daily. 90 tablet 0    lisinopril (ZESTRIL) 40 MG tablet TAKE 1 TABLET(40 MG) BY MOUTH DAILY 90 tablet 3    metoprolol succinate ER (TOPROL XL) 50 MG 24 hr tablet Take 1 tablet (50 mg) by mouth daily. 90 tablet 0    multiple vitamin  tablet TABS Take 1 tablet by mouth daily 90 tablet 3    omeprazole (PRILOSEC) 20 MG DR capsule TAKE 1 CAPSULE(20MG) BY MOUTH ONCE DAILY 30-60 MINUTES BEFORE A MEAL 90 capsule 3            Review of Systems  CONSTITUTIONAL: NEGATIVE for fever, chills,    MUSCULOSKELETAL: Bilateral knee pain         Objective    /86   Pulse 86   Temp 98.7  F (37.1  C) (Tympanic)   Resp 14   Ht 1.676 m (5' 6\")   Wt 84.9 kg (187 lb 1.6 oz)   LMP  (LMP Unknown)   SpO2 96%   BMI 30.20 kg/m    Body mass index is 30.2 kg/m .  Physical Exam   GENERAL: alert and no distress  MS: bilateral knees- no erythema, mild swelling, has tenderness on bilateral medial joint lines, grinding and clicking with knee movement        Signed Electronically by: Lucia Mcbride MD    "

## 2024-12-16 ENCOUNTER — OFFICE VISIT (OUTPATIENT)
Dept: ORTHOPEDICS | Facility: CLINIC | Age: 74
End: 2024-12-16
Attending: INTERNAL MEDICINE
Payer: COMMERCIAL

## 2024-12-16 VITALS
BODY MASS INDEX: 32.04 KG/M2 | DIASTOLIC BLOOD PRESSURE: 93 MMHG | HEIGHT: 65 IN | HEART RATE: 78 BPM | WEIGHT: 192.3 LBS | SYSTOLIC BLOOD PRESSURE: 166 MMHG

## 2024-12-16 DIAGNOSIS — M17.0 PRIMARY OSTEOARTHRITIS OF KNEES, BILATERAL: ICD-10-CM

## 2024-12-16 PROCEDURE — 20611 DRAIN/INJ JOINT/BURSA W/US: CPT | Mod: 50 | Performed by: STUDENT IN AN ORGANIZED HEALTH CARE EDUCATION/TRAINING PROGRAM

## 2024-12-16 PROCEDURE — 99214 OFFICE O/P EST MOD 30 MIN: CPT | Mod: 25 | Performed by: STUDENT IN AN ORGANIZED HEALTH CARE EDUCATION/TRAINING PROGRAM

## 2024-12-16 RX ORDER — ROPIVACAINE HYDROCHLORIDE 5 MG/ML
4 INJECTION, SOLUTION EPIDURAL; INFILTRATION; PERINEURAL
Status: COMPLETED | OUTPATIENT
Start: 2024-12-16 | End: 2024-12-16

## 2024-12-16 RX ORDER — LIDOCAINE HYDROCHLORIDE 10 MG/ML
5 INJECTION, SOLUTION INFILTRATION; PERINEURAL
Status: COMPLETED | OUTPATIENT
Start: 2024-12-16 | End: 2024-12-16

## 2024-12-16 RX ORDER — BETAMETHASONE SODIUM PHOSPHATE AND BETAMETHASONE ACETATE 3; 3 MG/ML; MG/ML
6 INJECTION, SUSPENSION INTRA-ARTICULAR; INTRALESIONAL; INTRAMUSCULAR; SOFT TISSUE
Status: COMPLETED | OUTPATIENT
Start: 2024-12-16 | End: 2024-12-16

## 2024-12-16 RX ADMIN — ROPIVACAINE HYDROCHLORIDE 4 ML: 5 INJECTION, SOLUTION EPIDURAL; INFILTRATION; PERINEURAL at 13:47

## 2024-12-16 RX ADMIN — LIDOCAINE HYDROCHLORIDE 5 ML: 10 INJECTION, SOLUTION INFILTRATION; PERINEURAL at 13:47

## 2024-12-16 RX ADMIN — BETAMETHASONE SODIUM PHOSPHATE AND BETAMETHASONE ACETATE 6 MG: 3; 3 INJECTION, SUSPENSION INTRA-ARTICULAR; INTRALESIONAL; INTRAMUSCULAR; SOFT TISSUE at 13:47

## 2024-12-16 NOTE — LETTER
12/16/2024      Jennifer Stinson  [address] [facility] [address]      Dear Colleague,    Thank you for referring your patient, Jennifer Stinson, to the [facility]. Please see a copy of my visit note below.    ASSESSMENT & PLAN    Jennifer was seen today for pain and pain.    Diagnoses and all orders for this visit:    Primary osteoarthritis of knees, bilateral  -     Orthopedic  Referral  -     Large Joint Injection/Arthocentesis: bilateral knee      This issue is acute on chronic and Worsening. Jennifer presents to our clinic today to discuss her chronic bilateral knee pain due to her underlying, advanced and severe bilateral knee osteoarthritis.  She was previously seen by my partner Dr. Mcwilliams who performed bilateral corticosteroid injections which provided approximately 5 to 6 months of relief.  We discussed that treatment today could be repeating the corticosteroid injections given her significant relief of versus referral to orthopedic surgery team to discuss a knee replacement, as her osteoarthritis is quite severe.  After this discussion, the patient wished to proceed with injections today and continue to defer surgery.  We determine the following plan:  - CSI to the bilateral knees performed today under ultrasound guidance, see procedure note below for details  - She can follow-up with us as needed    Jose Bruno,   [facility]    -----  Chief Complaint   Patient presents with     Right Knee - Pain     Left Knee - Pain       SUBJECTIVE  Jennifer Stinson is a/an 74 year old female who is seen in consultation at the request of  Lucia Mcbride M.D. for evaluation of bilateral knee.     The patient is seen grandson.  The patient is Right handed    Onset: 1 years(s) ago. Reports insidious onset without acute precipitating event.  Location of Pain: bilateral knee  Worsened by: walking,stairs,standing  Better  "with: ice  Treatments tried: rest/activity avoidance, elevation, ice, Tylenol, home exercises, previous imaging (xray 1/05/24), and corticosteroid injection (most recent date: 01/05/24) that provided  5-6 month(s) of relief  Associated symptoms: numbness, weakness of knee, and feeling of instability    Orthopedic/Surgical history: YES - Date: knee  Social History/Occupation: retired      REVIEW OF SYSTEMS:  Review of systems negative unless mentioned in HPI     OBJECTIVE:  BP (!) 166/93   Pulse 78   Ht 1.638 m (5' 4.5\")   Wt 87.2 kg (192 lb 4.8 oz)   LMP  (LMP Unknown)   BMI 32.50 kg/m     General: healthy, alert and in no distress  Skin: no suspicious lesions or rash.  CV: distal perfusion intact   Resp: normal respiratory effort without conversational dyspnea   Psych: normal mood and affect  Gait: NORMAL  Neuro: Normal light sensory exam of b/l lower extremity =    B/l Knee exam  Gait: Normal  Alignment:  [x] Normal  [] Anatomic valgus  [] Anatomic varus  Inspection: [x] Normal  [] Ecchymosis present []Other   Palpation:  Joint Tenderness: [] No Tenderness  [x] MJL [x] LJL [] MCL Margin [] LCL Margin [] Pes Anserine [] Distal Quadricep  [] Other   Peripatellar Tenderness: [] None [x] Lateral pole [x] Medial pole [] Superior pole [] Inferior pole    Patellar tendon pain: [x] None [] Present    Patellar apprehension: [x] None [] Present    Patellar motion: [x] Normal [] Abnormal  Crepitus: [x] None [] Present  Effusion: [x] None [] Trace [] 1+ [] 2+ [] 3+  Range of motion:  Flexion: 135, Extension: 0  Strength:  [x] Full in all planes, including intact extensor mechanism [] Limited as described  Neurologic: Normal sensation   Vascular: Normal pulses   Special tests:   Lachman: Negative  Anterior Drawer: Negative  Sag/quad Activation: NP  Posterior Drawer: Negative  Valgus Stress: Negative at 0/30  Varus Stress: Negative at 0/30  Char's: painful   Thessaly: NP     Other notable findings/comments: None   "     RADIOLOGY:  Final results and radiologist's interpretation, available in the [facility] health record.  Images were reviewed with the patient in the office today.  My personal interpretation of the performed imaging: Radiographs from 1/5/2024 reviewed and show advanced degenerative changes of the knees bilaterally.  No acute fractures.    Large Joint Injection/Arthocentesis: bilateral knee    Date/Time: 12/16/2024 1:47 PM    Performed by: Jose Bruno DO  Authorized by: Jose Bruno DO    Indications:  Pain and osteoarthritis  Needle Size:  22 G  Guidance: ultrasound    Approach:  Superolateral  Location:  Knee  Laterality:  Bilateral      Medications (Right):  6 mg betamethasone acet & sod phos 6 (3-3) MG/ML; 5 mL lidocaine 1 %; 4 mL ROPivacaine 5 MG/ML   Medications (Right) comment:  1ml of 8.4% Sodium Bicarbonate solution was used to buffer the local numbing agent for today's injection    Medications (Left):  6 mg betamethasone acet & sod phos 6 (3-3) MG/ML; 5 mL lidocaine 1 %; 4 mL ROPivacaine 5 MG/ML   Medications (Left) comment:  1ml of 8.4% Sodium Bicarbonate solution was used to buffer the local numbing agent for today's injection    Outcome:  Tolerated well, no immediate complications  Procedure discussed: discussed risks, benefits, and alternatives    Consent Given by:  Patient  Timeout: timeout called immediately prior to procedure    Prep: patient was prepped and draped in usual sterile fashion     Ultrasound was used to ensure safe and accurate needle placement and injection. Ultrasound images of the procedure were permanently stored.                 Again, thank you for allowing me to participate in the care of your patient.        Sincerely,        Jose Bruno DO

## 2024-12-16 NOTE — PROGRESS NOTES
ASSESSMENT & PLAN    Jennifer was seen today for pain and pain.    Diagnoses and all orders for this visit:    Primary osteoarthritis of knees, bilateral  -     Orthopedic  Referral  -     Large Joint Injection/Arthocentesis: bilateral knee      This issue is acute on chronic and Worsening. Jennifer presents to our clinic today to discuss her chronic bilateral knee pain due to her underlying, advanced and severe bilateral knee osteoarthritis.  She was previously seen by my partner Dr. Mcwilliams who performed bilateral corticosteroid injections which provided approximately 5 to 6 months of relief.  We discussed that treatment today could be repeating the corticosteroid injections given her significant relief of versus referral to orthopedic surgery team to discuss a knee replacement, as her osteoarthritis is quite severe.  After this discussion, the patient wished to proceed with injections today and continue to defer surgery.  We determine the following plan:  - CSI to the bilateral knees performed today under ultrasound guidance, see procedure note below for details  - She can follow-up with us as needed    Jose Bruno [facility]    -----  Chief Complaint   Patient presents with    Right Knee - Pain    Left Knee - Pain       SUBJECTIVE  Jennifer Stinson is a/an 74 year old female who is seen in consultation at the request of  Lucia Mcbride M.D. for evaluation of bilateral knee.     The patient is seen grandson.  The patient is Right handed    Onset: 1 years(s) ago. Reports insidious onset without acute precipitating event.  Location of Pain: bilateral knee  Worsened by: walking,stairs,standing  Better with: ice  Treatments tried: rest/activity avoidance, elevation, ice, Tylenol, home exercises, previous imaging (xray 1/05/24), and corticosteroid injection (most recent date: 01/05/24) that provided  5-6 month(s) of relief  Associated symptoms: numbness,  "weakness of knee, and feeling of instability    Orthopedic/Surgical history: YES - Date: knee  Social History/Occupation: retired      REVIEW OF SYSTEMS:  Review of systems negative unless mentioned in HPI     OBJECTIVE:  BP (!) 166/93   Pulse 78   Ht 1.638 m (5' 4.5\")   Wt 87.2 kg (192 lb 4.8 oz)   LMP  (LMP Unknown)   BMI 32.50 kg/m     General: healthy, alert and in no distress  Skin: no suspicious lesions or rash.  CV: distal perfusion intact   Resp: normal respiratory effort without conversational dyspnea   Psych: normal mood and affect  Gait: NORMAL  Neuro: Normal light sensory exam of b/l lower extremity =    B/l Knee exam  Gait: Normal  Alignment:  [x] Normal  [] Anatomic valgus  [] Anatomic varus  Inspection: [x] Normal  [] Ecchymosis present []Other   Palpation:  Joint Tenderness: [] No Tenderness  [x] MJL [x] LJL [] MCL Margin [] LCL Margin [] Pes Anserine [] Distal Quadricep  [] Other   Peripatellar Tenderness: [] None [x] Lateral pole [x] Medial pole [] Superior pole [] Inferior pole    Patellar tendon pain: [x] None [] Present    Patellar apprehension: [x] None [] Present    Patellar motion: [x] Normal [] Abnormal  Crepitus: [x] None [] Present  Effusion: [x] None [] Trace [] 1+ [] 2+ [] 3+  Range of motion:  Flexion: 135, Extension: 0  Strength:  [x] Full in all planes, including intact extensor mechanism [] Limited as described  Neurologic: Normal sensation   Vascular: Normal pulses   Special tests:   Lachman: Negative  Anterior Drawer: Negative  Sag/quad Activation: NP  Posterior Drawer: Negative  Valgus Stress: Negative at 0/30  Varus Stress: Negative at 0/30  Char's: painful   Thessaly: NP     Other notable findings/comments: None       RADIOLOGY:  Final results and radiologist's interpretation, available in the [facility] health record.  Images were reviewed with the patient in the office today.  My personal interpretation of the performed imaging: Radiographs from 1/5/2024 reviewed and show " advanced degenerative changes of the knees bilaterally.  No acute fractures.    Large Joint Injection/Arthocentesis: bilateral knee    Date/Time: 12/16/2024 1:47 PM    Performed by: Jose Bruno DO  Authorized by: Jose Bruno DO    Indications:  Pain and osteoarthritis  Needle Size:  22 G  Guidance: ultrasound    Approach:  Superolateral  Location:  Knee  Laterality:  Bilateral      Medications (Right):  6 mg betamethasone acet & sod phos 6 (3-3) MG/ML; 5 mL lidocaine 1 %; 4 mL ROPivacaine 5 MG/ML   Medications (Right) comment:  1ml of 8.4% Sodium Bicarbonate solution was used to buffer the local numbing agent for today's injection    Medications (Left):  6 mg betamethasone acet & sod phos 6 (3-3) MG/ML; 5 mL lidocaine 1 %; 4 mL ROPivacaine 5 MG/ML   Medications (Left) comment:  1ml of 8.4% Sodium Bicarbonate solution was used to buffer the local numbing agent for today's injection    Outcome:  Tolerated well, no immediate complications  Procedure discussed: discussed risks, benefits, and alternatives    Consent Given by:  Patient  Timeout: timeout called immediately prior to procedure    Prep: patient was prepped and draped in usual sterile fashion     Ultrasound was used to ensure safe and accurate needle placement and injection. Ultrasound images of the procedure were permanently stored.

## 2024-12-18 ENCOUNTER — OFFICE VISIT (OUTPATIENT)
Dept: INTERNAL MEDICINE | Facility: CLINIC | Age: 74
End: 2024-12-18
Payer: COMMERCIAL

## 2024-12-18 VITALS
WEIGHT: 190.5 LBS | DIASTOLIC BLOOD PRESSURE: 84 MMHG | HEIGHT: 66 IN | SYSTOLIC BLOOD PRESSURE: 134 MMHG | OXYGEN SATURATION: 98 % | HEART RATE: 100 BPM | RESPIRATION RATE: 16 BRPM | BODY MASS INDEX: 30.62 KG/M2 | TEMPERATURE: 97.4 F

## 2024-12-18 DIAGNOSIS — K74.69 OTHER CIRRHOSIS OF LIVER (H): ICD-10-CM

## 2024-12-18 DIAGNOSIS — M17.10 ARTHRITIS OF KNEE: Primary | ICD-10-CM

## 2024-12-18 DIAGNOSIS — I10 ESSENTIAL HYPERTENSION: ICD-10-CM

## 2024-12-18 DIAGNOSIS — K21.9 GASTROESOPHAGEAL REFLUX DISEASE WITHOUT ESOPHAGITIS: ICD-10-CM

## 2024-12-18 DIAGNOSIS — E03.8 OTHER SPECIFIED HYPOTHYROIDISM: ICD-10-CM

## 2024-12-18 PROCEDURE — 99214 OFFICE O/P EST MOD 30 MIN: CPT | Performed by: INTERNAL MEDICINE

## 2024-12-18 NOTE — PROGRESS NOTES
"  Assessment & Plan     Arthritis of knee  Has received intra-articular injections bilaterally.  Does endorse having improvement in pain symptoms.  Continue use of Tylenol medication as needed.    Essential hypertension  Blood pressure reviewed, within target.  Target of less than 140/90.  Continue current antihypertensive regimen.    Gastroesophageal reflux disease without esophagitis  Has resumed omeprazole medication.  Continue medication at 20 mg daily.    Other specified hypothyroidism  Overall, clinically stable.  Continue levothyroxine at the current dose.    Other cirrhosis of liver  Had a follow-up with [facility] and was recommended to complete additional testing.  Has not managed to have a return visit or complete testing due to being out of the country.  Encouraged to schedule a follow-up visit.        BMI  Estimated body mass index is 30.75 kg/m  as calculated from the following:    Height as of this encounter: 1.676 m (5' 6\").    Weight as of this encounter: 86.4 kg (190 lb 8 oz).             Grayson Rodriguez is a 74 year old, presenting for the following health issues:  Follow Up    History of Present Illness       Reason for visit:  Leg swelling, knee pain. Painful to walk    She eats 2-3 servings of fruits and vegetables daily.She consumes 0 sweetened beverage(s) daily.She exercises with enough effort to increase her heart rate 9 or less minutes per day.  She exercises with enough effort to increase her heart rate 3 or less days per week.   She is taking medications regularly.                 Review of Systems  Constitutional, HEENT, cardiovascular, pulmonary, gi and gu systems are negative, except as otherwise noted.      Objective    /84 (BP Location: Right arm, Patient Position: Sitting, Cuff Size: Adult Regular)   Pulse 100   Temp 97.4  F (36.3  C) (Tympanic)   Resp 16   Ht 1.676 m (5' 6\")   Wt 86.4 kg (190 lb 8 oz)   LMP  (LMP Unknown)   SpO2 98%   BMI 30.75 kg/m    Body mass " index is 30.75 kg/m .  Physical Exam   GENERAL: alert and no distress  RESP: lungs clear to auscultation - no rales, rhonchi or wheezes  CV: regular rate and rhythm, normal S1 S2  MS: no gross musculoskeletal defects noted, no edema  NEURO: Normal strength and tone, mentation intact and speech normal  PSYCH: mentation appears normal, affect normal.            Signed Electronically by: Erika Luis MD

## 2025-01-23 ENCOUNTER — PATIENT OUTREACH (OUTPATIENT)
Dept: GERIATRIC MEDICINE | Facility: CLINIC | Age: 75
End: 2025-01-23
Payer: COMMERCIAL

## 2025-01-23 NOTE — PROGRESS NOTES
[facility] Care Coordination Contact    Received task from care coordinator.  Completed following tasks: Mailed copy of support plan to member, Submitted referrals/auths for [facility], and Updated services in Database.   and Member Signature - Support Plan Change:  Per CC, member has made a change to their support plan.  Care Plan Change Letter mailed to member for signature with a self-addressed return envelope.    Polina Frazier  Care Management Specialist  [facility]  [phone]

## 2025-01-23 NOTE — PROGRESS NOTES
[facility] Care Coordination Contact      [facility] Six-Month Telephone Assessment    6 month telephone assessment completed on 1/23/2025.    ER visits: No  Hospitalizations: No  TCU stays: No  Significant health status changes: Cindy reports that Jennifer has been having difficulties with upset stomach and indigestion. She received a referral to [facility] to follow-up. CC reviewed and provided the number for scheduling.  During her visit with Dr. Mcbride on 12/12 she was advised to see an orthopedic specialist to address knee pain. CC reviewed this recommendation and provided the number for scheduling.   Falls/Injuries: No  ADL/IADL changes: Cindy reports that Jennifer's hip pain and mobility is getting worse related to arthritis diagnosis, and requested an increase in PCA. CC reviewed that PCA hours cannot be adjusted outside of an assessment. CC requested that Cindy assist Jennifer in following up with specialty providers first to get a better understanding of the cause prior to completing a early reassessment.   Discussed potential options including: in-home therapy-Cindy declined for now and will discuss with specialists.   Changes in services: Yes, CC will increase current homemaking hours from 3 hours a week to 5 hours a week to accommodate additional time needed to help with specialty appointments and medical management ( GI, Orthopedics, and potentially therapy).     Caregiver Assessment follow up:  n/a    Goals: See Support Plan for goal progress documentation.      Will see member in 6 months for an annual health risk assessment.   Encouraged member to call CC with any questions or concerns in the meantime.     MATT Young  [facility]  [phone]

## 2025-02-22 DIAGNOSIS — E03.8 OTHER SPECIFIED HYPOTHYROIDISM: ICD-10-CM

## 2025-02-22 DIAGNOSIS — K21.9 GASTROESOPHAGEAL REFLUX DISEASE WITHOUT ESOPHAGITIS: ICD-10-CM

## 2025-02-22 DIAGNOSIS — I10 ESSENTIAL HYPERTENSION WITH GOAL BLOOD PRESSURE LESS THAN 140/90: ICD-10-CM

## 2025-02-24 RX ORDER — OMEPRAZOLE 20 MG/1
CAPSULE, DELAYED RELEASE ORAL
Qty: 90 CAPSULE | Refills: 2 | Status: SHIPPED | OUTPATIENT
Start: 2025-02-24

## 2025-02-24 RX ORDER — LISINOPRIL 40 MG/1
TABLET ORAL
Qty: 90 TABLET | Refills: 2 | Status: SHIPPED | OUTPATIENT
Start: 2025-02-24

## 2025-02-24 RX ORDER — AMLODIPINE BESYLATE 10 MG/1
10 TABLET ORAL DAILY
Qty: 90 TABLET | Refills: 2 | Status: SHIPPED | OUTPATIENT
Start: 2025-02-24

## 2025-02-24 RX ORDER — LEVOTHYROXINE SODIUM 88 UG/1
88 TABLET ORAL DAILY
Qty: 90 TABLET | Refills: 2 | Status: SHIPPED | OUTPATIENT
Start: 2025-02-24

## 2025-02-24 RX ORDER — METOPROLOL SUCCINATE 50 MG/1
50 TABLET, EXTENDED RELEASE ORAL DAILY
Qty: 90 TABLET | Refills: 2 | Status: SHIPPED | OUTPATIENT
Start: 2025-02-24

## 2025-03-08 ENCOUNTER — HEALTH MAINTENANCE LETTER (OUTPATIENT)
Age: 75
End: 2025-03-08

## 2025-03-17 NOTE — PROGRESS NOTES
Blood pressure elevated today.  Discussed with the patient about increasing the dose of amlodipine but at present she would like to observe on present medications.  Advised for low-salt diet.  Orders:  •  CBC and differential; Future  •  Comprehensive metabolic panel; Future   [facility] Care Coordination Contact    [facility] Home Visit Assessment     Home visit for Health Risk Assessment with Jennifer Stinson completed on July 15, 2024    Type of residence:: Private home - stairs  Current living arrangement:: I live in a private home with family     Assessment completed with:: Patient, Children, Care Team Member    Current Care Plan  Member currently receiving the following home care services:  N/A   Member currently receiving the following community resources: DME, Housekeeping/Chore Agency, PCA      Medication Review  Medication reconciliation completed in Epic: Yes  Medication set-up & administration: Family/informal caregiver sets up daily.  Family caregiver administers medications.  Medication Risk Assessment Medication (1 or more, place referral to MTM): N/A: No risk factors identified  MTM Referral Placed: No: No risk factors idenified  Family requested BID pill box to make medication set up easier.     Mental/Behavioral Health   Depression Screening:   PHQ-2 Total Score (Adult) - Positive if 3 or more points; Administer PHQ-9 if positive: 0       Mental health DX:: No        Falls Assessment:   Fallen 2 or more times in the past year?: No   Any fall with injury in the past year?: No    ADL/IADL Dependencies:   Dependent ADLs:: Ambulation-walker, Bathing, Dressing, Toileting, Transfers, Grooming  Dependent IADLs:: Cleaning, Cooking, Laundry, Shopping, Meal Preparation, Medication Management, Money Management, Transportation    Health Plan sponsored benefits: [employer]: Shared information regarding preventative health screening and health plan supplemental benefits/incentives. Reviewed medication disposal form.  Jennifer would like to access the [facility] of [facility] and does have Medicare Part A and B. Care coordinator called [employer] and they will reach out to member/daughter to complete [employer] application over the phone.     PCA Assessment completed at visit: Yes Annual  PCA assessment indicated 4 hours per day of PCA. This is the same as the previous assessment.     Elderly Waiver Eligibility: Yes-will continue on EW    Care Plan & Recommendations: Jennifer continues to live with family and receive 24/7 support from them. She reported more pain this year and limitations in her ability to get out and do things. She manages her pain the best she can and wasn't interested in pursing other options.   Jennifer has stomach issues that create a lot of issues for her. She has an upcoming endoscopy to address the concern. She is hopeful for answers.   Jennifer requested the following waiver supplies: BID pill box, bed rail and 1 set adaptive silverwear.       See [facility] for detailed assessment information.    Follow-Up Plan: Member informed of future contact, plan to f/u with member with a 6 month telephone assessment.  Contact information shared with member and family, encouraged member to call with any questions or concerns at any time.    [facility] care continuum providers: Please see Snapshot and Care Management Flowsheets for Specific details of care plan.    This CC note routed to PCP, Erika Luis.    WILFREDO Pablo, Manager   [facility]  [phone]

## 2025-05-29 ENCOUNTER — PATIENT OUTREACH (OUTPATIENT)
Dept: GERIATRIC MEDICINE | Facility: CLINIC | Age: 75
End: 2025-05-29
Payer: COMMERCIAL

## 2025-05-29 NOTE — PROGRESS NOTES
[facility] Care Coordination Contact      Called member daughter Cnidy and left a voice mail message to remind member to schedule Colonoscopy, Mammogram, and Wellness Exam exam(s).  Left contact info.      LAVON Porter  [facility]  [phone]

## 2025-06-03 ENCOUNTER — PATIENT OUTREACH (OUTPATIENT)
Dept: GERIATRIC MEDICINE | Facility: CLINIC | Age: 75
End: 2025-06-03
Payer: COMMERCIAL

## 2025-06-03 NOTE — PROGRESS NOTES
[facility] Care Coordination Contact    Called adult daughter Cindy to schedule annual HRA home visit. HRA has been scheduled for Monday 6/16 at 12:00pm.  Called Brittney Cornejo and scheduled an  for the home visit.    MATT Young  [facility]  [phone]

## 2025-06-03 NOTE — TELEPHONE ENCOUNTER
Daughter calls and they would like an RX for 4 wheel walker with a seat to be faxed to [facility] at [phone] as soon as possible. Patient is having a hard time getting around.   Yes

## 2025-06-12 ENCOUNTER — OFFICE VISIT (OUTPATIENT)
Dept: INTERNAL MEDICINE | Facility: CLINIC | Age: 75
End: 2025-06-12
Payer: COMMERCIAL

## 2025-06-12 VITALS
SYSTOLIC BLOOD PRESSURE: 138 MMHG | WEIGHT: 187.7 LBS | RESPIRATION RATE: 18 BRPM | TEMPERATURE: 98.2 F | HEIGHT: 66 IN | BODY MASS INDEX: 30.17 KG/M2 | HEART RATE: 69 BPM | DIASTOLIC BLOOD PRESSURE: 70 MMHG | OXYGEN SATURATION: 98 %

## 2025-06-12 DIAGNOSIS — I10 ESSENTIAL HYPERTENSION: ICD-10-CM

## 2025-06-12 DIAGNOSIS — Z86.19 HISTORY OF HEPATITIS B: ICD-10-CM

## 2025-06-12 DIAGNOSIS — Z12.11 SCREEN FOR COLON CANCER: ICD-10-CM

## 2025-06-12 DIAGNOSIS — R10.13 ABDOMINAL PAIN, EPIGASTRIC: ICD-10-CM

## 2025-06-12 DIAGNOSIS — Z78.0 ASYMPTOMATIC POSTMENOPAUSAL STATUS: ICD-10-CM

## 2025-06-12 DIAGNOSIS — K59.09 OTHER CONSTIPATION: ICD-10-CM

## 2025-06-12 DIAGNOSIS — Z00.00 ENCOUNTER FOR ANNUAL WELLNESS VISIT (AWV) IN MEDICARE PATIENT: Primary | ICD-10-CM

## 2025-06-12 DIAGNOSIS — R18.8 CIRRHOSIS OF LIVER WITH ASCITES, UNSPECIFIED HEPATIC CIRRHOSIS TYPE (H): ICD-10-CM

## 2025-06-12 DIAGNOSIS — Z12.31 VISIT FOR SCREENING MAMMOGRAM: ICD-10-CM

## 2025-06-12 DIAGNOSIS — R26.81 GAIT INSTABILITY: ICD-10-CM

## 2025-06-12 DIAGNOSIS — K74.60 CIRRHOSIS OF LIVER WITH ASCITES, UNSPECIFIED HEPATIC CIRRHOSIS TYPE (H): ICD-10-CM

## 2025-06-12 DIAGNOSIS — E78.5 HYPERLIPIDEMIA WITH TARGET LDL LESS THAN 130: ICD-10-CM

## 2025-06-12 DIAGNOSIS — E03.8 OTHER SPECIFIED HYPOTHYROIDISM: ICD-10-CM

## 2025-06-12 DIAGNOSIS — R93.2 NONVISUALIZATION OF GALLBLADDER: ICD-10-CM

## 2025-06-12 LAB
BASOPHILS # BLD AUTO: 0 10E3/UL (ref 0–0.2)
BASOPHILS NFR BLD AUTO: 1 %
EOSINOPHIL # BLD AUTO: 0.2 10E3/UL (ref 0–0.7)
EOSINOPHIL NFR BLD AUTO: 4 %
ERYTHROCYTE [DISTWIDTH] IN BLOOD BY AUTOMATED COUNT: 13.7 % (ref 10–15)
HCT VFR BLD AUTO: 43.8 % (ref 35–47)
HGB BLD-MCNC: 14.9 G/DL (ref 11.7–15.7)
IMM GRANULOCYTES # BLD: 0 10E3/UL
IMM GRANULOCYTES NFR BLD: 0 %
INR PPP: 1 (ref 0.85–1.15)
LYMPHOCYTES # BLD AUTO: 2.6 10E3/UL (ref 0.8–5.3)
LYMPHOCYTES NFR BLD AUTO: 46 %
MCH RBC QN AUTO: 30.5 PG (ref 26.5–33)
MCHC RBC AUTO-ENTMCNC: 34 G/DL (ref 31.5–36.5)
MCV RBC AUTO: 90 FL (ref 78–100)
MONOCYTES # BLD AUTO: 0.9 10E3/UL (ref 0–1.3)
MONOCYTES NFR BLD AUTO: 16 %
NEUTROPHILS # BLD AUTO: 1.9 10E3/UL (ref 1.6–8.3)
NEUTROPHILS NFR BLD AUTO: 34 %
PLATELET # BLD AUTO: 185 10E3/UL (ref 150–450)
PROTHROMBIN TIME: 13.6 SECONDS (ref 11.8–14.8)
RBC # BLD AUTO: 4.89 10E6/UL (ref 3.8–5.2)
WBC # BLD AUTO: 5.6 10E3/UL (ref 4–11)

## 2025-06-12 SDOH — HEALTH STABILITY: PHYSICAL HEALTH: ON AVERAGE, HOW MANY DAYS PER WEEK DO YOU ENGAGE IN MODERATE TO STRENUOUS EXERCISE (LIKE A BRISK WALK)?: 1 DAY

## 2025-06-12 SDOH — HEALTH STABILITY: PHYSICAL HEALTH: ON AVERAGE, HOW MANY DAYS PER WEEK DO YOU ENGAGE IN MODERATE TO STRENUOUS EXERCISE (LIKE A BRISK WALK)?: 0 DAYS

## 2025-06-12 ASSESSMENT — PATIENT HEALTH QUESTIONNAIRE - PHQ9
SUM OF ALL RESPONSES TO PHQ QUESTIONS 1-9: 15
10. IF YOU CHECKED OFF ANY PROBLEMS, HOW DIFFICULT HAVE THESE PROBLEMS MADE IT FOR YOU TO DO YOUR WORK, TAKE CARE OF THINGS AT HOME, OR GET ALONG WITH OTHER PEOPLE: NOT DIFFICULT AT ALL
SUM OF ALL RESPONSES TO PHQ QUESTIONS 1-9: 15

## 2025-06-12 ASSESSMENT — SOCIAL DETERMINANTS OF HEALTH (SDOH)
HOW OFTEN DO YOU GET TOGETHER WITH FRIENDS OR RELATIVES?: ONCE A WEEK
HOW OFTEN DO YOU GET TOGETHER WITH FRIENDS OR RELATIVES?: ONCE A WEEK

## 2025-06-12 ASSESSMENT — PAIN SCALES - GENERAL: PAINLEVEL_OUTOF10: NO PAIN (0)

## 2025-06-12 NOTE — PROGRESS NOTES
Preventive Care Visit  [facility]  Erika Luis MD, Internal Medicine  Jun 12, 2025      Assessment & Plan     Encounter for annual wellness visit (AWV) in Medicare patient  Mammogram ordered and colonoscopy order placed.    Other specified hypothyroidism  Clinically stable.  Continue levothyroxine at current dose.  Update lab work after today's visit.  - TSH WITH FREE T4 REFLEX; Future  - TSH WITH FREE T4 REFLEX    Hyperlipidemia with target LDL less than 130  Patient continues on Lipitor 40 mg daily.  Tolerating medication well.  Update lipid panel.  - Lipid panel reflex to direct LDL Fasting; Future  - Lipid panel reflex to direct LDL Fasting    Asymptomatic postmenopausal status  - DEXA HIP/PELVIS/SPINE - Future; Future    Visit for screening mammogram  - MA Screening Bilateral w/ Jose; Future    Cirrhosis of liver with ascites  Screen for colon cancer  As patient will be proceeding with EGD with [facility], prefers to complete colonoscopy with the same team during the same setting.  Patient is undergoing further evaluation with [facility] for possible underlying liver cirrhosis.  Has a FibroScan scheduled for tomorrow.  Referral has been placed for the colonoscopy and patient will be discussing this further with the [facility] team.  - Colonoscopy Screening  Referral; Future    Essential hypertension  Blood pressure initially elevated and improved on repeat read.  Patient continues on metoprolol, lisinopril and amlodipine medications.  Encouraged to monitor blood pressure at home.    Gait instability  Would like to proceed with physical therapy for general strengthening exercises.  - Physical Therapy  Referral; Future    Counseling  Appropriate preventive services were addressed with this patient via screening, questionnaire, or discussion as appropriate for fall prevention, nutrition, physical activity, Tobacco-use cessation, social engagement, weight loss and  "cognition.   Reviewed patient's diet, addressing concerns and/or questions.           Patient has been advised of split billing requirements and indicates understanding: Yes    Due to language barrier, an  on the ipad was present during the history-taking and subsequent discussion (and for part of the physical exam) with this patient.       BMI  Estimated body mass index is 30.3 kg/m  as calculated from the following:    Height as of this encounter: 1.676 m (5' 6\").    Weight as of this encounter: 85.1 kg (187 lb 11.2 oz).       Depression Screening Follow Up        6/12/2025     1:37 PM   PHQ   PHQ-9 Total Score 15    Q9: Thoughts of better off dead/self-harm past 2 weeks Not at all       Patient-reported             Subjective   Jennifer is a 75 year old, presenting for the following:  Wellness Visit        6/12/2025    12:54 PM   Additional Questions   Roomed by hope r   Accompanied by daughter         6/12/2025    12:54 PM   Patient Reported Additional Medications   Patient reports taking the following new medications n/a         HPI           Advance Care Planning    Discussed advance care planning with patient; however, patient declined at this time.        6/12/2025   General Health   How would you rate your overall physical health? (!) FAIR   Feel stress (tense, anxious, or unable to sleep) Only a little         6/12/2025   Nutrition   Diet: I don't know         6/12/2025   Exercise   Days per week of moderate/strenous exercise 0 days         6/12/2025   Social Factors   Frequency of gathering with friends or relatives Once a week   Worry food won't last until get money to buy more No   Food not last or not have enough money for food? No   Do you have housing? (Housing is defined as stable permanent housing and does not include staying outside in a car, in a tent, in an abandoned building, in an overnight shelter, or couch-surfing.) Yes   Are you worried about losing your housing? No   Lack of " transportation? No   Unable to get utilities (heat,electricity)? No         1/5/2024   Activities of Daily Living- Home Safety   Needs help with the following daily activites Transportation    Shopping    Preparing meals    Housework    Laundry   Safety concerns in the home None of the above       Multiple values from one day are sorted in reverse-chronological order         6/12/2025   Dental   Dentist two times every year? (!) NO         1/5/2024   Hearing Screening   Hearing concerns? No concerns            No data to display                        6/12/2025   Substance Use   Alcohol more than 3/day or more than 7/wk No   Do you have a current opioid prescription? No   How severe/bad is pain from 1 to 10? 0/10 (No Pain)   Do you use any other substances recreationally? No     Social History     Tobacco Use    Smoking status: Never    Smokeless tobacco: Never   Vaping Use    Vaping status: Never Used   Substance Use Topics    Alcohol use: No    Drug use: No          Mammogram Screening - Mammogram every 1-2 years updated in Health Maintenance based on mutual decision making    ASCVD Risk   The ASCVD Risk score (Queenie DK, et al., 2019) failed to calculate for the following reasons:    Risk score cannot be calculated because patient has a medical history suggesting prior/existing ASCVD            Reviewed and updated as needed this visit by Provider    Allergies  Meds  Problems                 Current providers sharing in care for this patient include:  Patient Care Team:  Erika Luis MD as PCP - General (Internal Medicine)  Blanca Edmond PA-C as Referring Physician (Internal Medicine)  Noe Alarcon MD as MD (Allergy & Immunology)  Ayanna Santana LSW as Lead Care Coordinator (Primary Care - CC)  Erika Luis MD as Assigned PCP  Suzette Mcwilliams DO as Assigned Neuroscience Provider  Jose Bruno DO as Assigned Musculoskeletal Provider    The following health maintenance  "items are reviewed in Epic and correct as of today:  Health Maintenance   Topic Date Due    DEXA  Never done    MAMMO SCREENING  Never done    COLORECTAL CANCER SCREENING  Never done    PNEUMOCOCCAL VACCINE 50+ YEARS (1 of 2 - PCV) Never done    HEPATITIS A VACCINE (1 of 2 - Risk 2-dose series) Never done    ZOSTER VACCINE (1 of 2) Never done    HEPATITIS B VACCINE (1 of 3 - Risk 3-dose series) Never done    RSV VACCINE (1 - Risk 60-74 years 1-dose series) Never done    MEDICARE ANNUAL WELLNESS VISIT  08/18/2021    COVID-19 VACCINE (3 - 2024-25 season) 09/01/2024    BMP  03/07/2025    TSH W/FREE T4 REFLEX  03/07/2025    ANNUAL REVIEW OF HM ORDERS  03/07/2025    LIPID  04/02/2025    INFLUENZA VACCINE (Season Ended) 09/01/2025    FALL RISK ASSESSMENT  06/12/2026    DIABETES SCREENING  03/07/2027    ADVANCE CARE PLANNING  06/12/2030    DTAP/TDAP/TD VACCINE (2 - Td or Tdap) 08/18/2030    HEPATITIS C SCREENING  Completed    PHQ-2 (once per calendar year)  Completed    HPV VACCINE  Aged Out    MENINGITIS VACCINE  Aged Out         Review of Systems  Constitutional, HEENT, cardiovascular, pulmonary, gi and gu systems are negative, except as otherwise noted.     Objective    Exam  /70   Pulse 69   Temp 98.2  F (36.8  C) (Tympanic)   Resp 18   Ht 1.676 m (5' 6\")   Wt 85.1 kg (187 lb 11.2 oz)   LMP  (LMP Unknown)   SpO2 98%   BMI 30.30 kg/m     Estimated body mass index is 30.3 kg/m  as calculated from the following:    Height as of this encounter: 1.676 m (5' 6\").    Weight as of this encounter: 85.1 kg (187 lb 11.2 oz).    Physical Exam  GENERAL: alert and no distress  RESP: lungs clear to auscultation - no rales, rhonchi or wheezes  CV: regular rate and rhythm, normal S1 S2  ABDOMEN: soft, nontender, no hepatosplenomegaly, no masses   MS: no gross musculoskeletal defects noted, no edema  SKIN: no suspicious lesions or rashes  NEURO: Normal strength and tone, mentation intact and speech normal  PSYCH: mentation " appears normal, affect normal.        6/12/2025   Mini Cog   Clock Draw Score --   3 Item Recall 3 objects recalled              Signed Electronically by: Erika Luis MD

## 2025-06-13 ENCOUNTER — APPOINTMENT (OUTPATIENT)
Dept: INTERPRETER SERVICES | Facility: CLINIC | Age: 75
End: 2025-06-13
Payer: COMMERCIAL

## 2025-06-13 ENCOUNTER — RESULTS FOLLOW-UP (OUTPATIENT)
Dept: INTERNAL MEDICINE | Facility: CLINIC | Age: 75
End: 2025-06-13

## 2025-06-14 LAB
GLIADIN IGA SER-ACNC: 2.2 U/ML
GLIADIN IGG SER-ACNC: <0.6 U/ML
IGA SERPL-MCNC: 675 MG/DL (ref 84–499)
TTG IGA SER-ACNC: 1.4 U/ML
TTG IGG SER-ACNC: 0.8 U/ML

## 2025-06-16 ENCOUNTER — TRANSFERRED RECORDS (OUTPATIENT)
Dept: ADMINISTRATIVE | Facility: CLINIC | Age: 75
End: 2025-06-16
Payer: COMMERCIAL

## 2025-06-17 NOTE — PROGRESS NOTES
2025        Jennifer Stinson   [address] [place] [address]      Jennifer Stinson,  :  1950    I am writing to let you know the results of the tests that were done the other day.   Thank you for allowing [facility] the opportunity to take part in your healthcare.  At [facility] we strive to provide each patient with the finest gastroenterology care available.  We hope your experience was pleasant and informative.    I have received your recent bloodwork results. Your liver enzyme levels are still elevated.  Your AFP tumor marker is also very slightly elevated. I recommend you continue with FibroScan as scheduled 2025.  I would like you to follow-up with our liver specialists in clinic as well.  Lastly, your labwork shows no evidence of celiac disease (normal IgA + t-Reis).    I hope you are feeling well.        Thank you.    Electronically signed by:  Shyanne BRUNO 2025 12:05 PM  Document generated by:  Shyanne BRUNO  2025  If your provider ordered multiple tests; the results may not become available at the same time.  If multiple test results are received within 14 days of one another, you may receive a duplicate.  cc:  Erika Luis MD

## 2025-06-20 ENCOUNTER — TRANSFERRED RECORDS (OUTPATIENT)
Dept: ADMINISTRATIVE | Facility: CLINIC | Age: 75
End: 2025-06-20
Payer: COMMERCIAL

## 2025-06-24 ENCOUNTER — PATIENT OUTREACH (OUTPATIENT)
Dept: GERIATRIC MEDICINE | Facility: CLINIC | Age: 75
End: 2025-06-24
Payer: COMMERCIAL

## 2025-06-24 DIAGNOSIS — R26.89 IMBALANCE: ICD-10-CM

## 2025-06-24 DIAGNOSIS — I10 ESSENTIAL HYPERTENSION: Primary | ICD-10-CM

## 2025-06-24 NOTE — PROGRESS NOTES
2025        Jennifer Stinson   [address] [place] [address]      Jennifer Stinson,  :  1950    I am writing to let you know the results of the tests that were done the other day.   Thank you for allowing [facility] the opportunity to take part in your healthcare.  At [facility] we strive to provide each patient with the finest gastroenterology care available.  We hope your experience was pleasant and informative.    I have received your FibroScan results as we discussed over the phone.   Your CAP score was 223. This is equal to a stage S0 steatosis (fatty liver) score.  Stages of steatosis are measured from stage 0 to stage 3.  S0= CAP score equal to or lower than 238   S1= CAP score of 239 to 258   S2=CAP score of 259 to 291   S3= CAP score of 292 or greater    Your kPa score was 35.5.  The kPa score is used to evaluate fibrosis or scarring in the liver.  Your score is consistent with a F4 stage of scarring which indicates severe scarring.   I have ordered a liver ultrasound and additional bloodwork to determine the cause of your cirrhosis. I would also like you to follow-up in our Hepatology clinic.      Thank you.    Electronically signed by:  Shyanne BRUNO 2025 04:34 PM  Document generated by:  Shyanne BRUNO  2025  If your provider ordered multiple tests; the results may not become available at the same time.  If multiple test results are received within 14 days of one another, you may receive a duplicate.  cc:  Erika Luis MD  cc:

## 2025-06-24 NOTE — Clinical Note
Hello,  I am the [facility] hospital care coordinator for Jennifer Stinson.  I am writing to let you know I completed Jennifer's annual assessment today. All of my documentation can be found in EPIC. Please do not hesitate to contact me with any questions or concerns.  MATT Young [facility] Care Coordinator [phone]

## 2025-06-30 NOTE — PROGRESS NOTES
[facility] Care Coordination Contact    [facility] Home Visit Assessment     Home visit for Health Risk Assessment with Jennifer Stinson completed on June 24, 2025    Type of residence: Private home - stairs  Current living arrangement: I live in a private home with family     Assessment completed with: Patient, Children, Care Team Member    Current Care Plan  Member currently receiving the following home care services:   None  Member currently receiving the following community resources: DME, Housekeeping/Chore Agency, PCA    Medication Review  Medication reconciliation completed in Epic: Yes  Medication set-up & administration: Independent-does not set up.  Self-administers medications.  Medication Risk Assessment Medication (1 or more, place referral to MTM): Lacks understanding of medication regimen  MTM Referral Placed: Yes: MTM Referral Placed   Member isn't taking Lisinopril and is wondering if she should and when it was prescribed and what for.     Mental/Behavioral Health   Depression Screening:   PHQ-2 Total Score (Adult) - Positive if 3 or more points; Administer PHQ-9 if positive: 0  Mental health DX: No        Falls Assessment:   Fallen 2 or more times in the past year?: No   Any fall with injury in the past year?: No    ADL/IADL Dependencies:   Dependent ADLs: Ambulation-walker, Bathing, Dressing, Toileting, Transfers, Grooming  Dependent IADLs: Cleaning, Cooking, Laundry, Shopping, Meal Preparation, Medication Management, Money Management, Transportation    Health Plan sponsored benefits: [facility]: Shared information regarding [employer]. Reviewed preventative health screening and health plan supplemental benefits/incentives. Reviewed medication disposal form and transition of care member handout.    CFSS Assessment completed at visit: Yes Annual CFSS assessment indicated 4 hours per day of CFSS. This is the same as the previous assessment.     Elderly Waiver Eligibility:  Yes-will continue on EW    Care Plan & Recommendations: Jennifer is seen today for her annual reassessment to determine eligibility for ongoing support services and make changes to her current plan as needed.    Jennifer Stinson is a 75-year-old female with a past medical history of Hyperlipidemia, Essential hypertension, Gastroesophageal reflux disease, history of stroke, Rheumatoid arthritis, Latent tuberculosis, and Cirrhosis of the liver.     Jennifer had a stroke in 2013 when she was visiting [place] for a wedding. She spent several weeks in the hospital, but made a full recovery and continued to work until she retired in 2019 due to age and generalized pain. She has been seen regularly by her PCP this year for the following: arthritis of the knee and Primary osteoarthritis of the knees. Jennifer feels her largest health concerns are related to generalized pain.     Jennifer grew up in [place]. She was an . She worked in the trades, importing and exporting rice. Jennifer came to the [place] seeking asylum due to the Civil War in [place]. She originally lived in [place], where she went to school for child development.  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 In 2013, she suffered a stroke and retired. In 2019, she moved to [place] to be closer to family and have more support. She's not interested in working or volunteering at this time.     Lakeisha continues to require assistance with ADL/IADL's and would continue to benefit from daily support provided through the PCA program, as well as Elderly Waiver support with DME and homemaking.     Requesting a shower chair and 4 wheeled walker.     See [facility] Assessment for detailed assessment information.    Follow-Up Plan: Member informed of future contact, plan to f/u with member with a 6 month telephone assessment.  Contact information shared with member and family, encouraged member to call with  any questions or concerns at any time.    [place] care continuum providers: Please see Snapshot and Care Management Flowsheets for Specific details of care plan.    This CC note routed to PCP, Erika Luis.    MATT Young  [facility]  [phone]

## 2025-07-01 ENCOUNTER — PATIENT OUTREACH (OUTPATIENT)
Dept: GERIATRIC MEDICINE | Facility: CLINIC | Age: 75
End: 2025-07-01
Payer: COMMERCIAL

## 2025-07-01 ENCOUNTER — MYC MEDICAL ADVICE (OUTPATIENT)
Dept: INTERNAL MEDICINE | Facility: CLINIC | Age: 75
End: 2025-07-01
Payer: COMMERCIAL

## 2025-07-01 DIAGNOSIS — R18.8 CIRRHOSIS OF LIVER WITH ASCITES, UNSPECIFIED HEPATIC CIRRHOSIS TYPE (H): Primary | ICD-10-CM

## 2025-07-01 DIAGNOSIS — K74.60 CIRRHOSIS OF LIVER WITH ASCITES, UNSPECIFIED HEPATIC CIRRHOSIS TYPE (H): Primary | ICD-10-CM

## 2025-07-01 NOTE — PROGRESS NOTES
[facility] Care Coordination Contact    Received after visit chart from care coordinator.  Completed following tasks: Mailed copy of support plan to member, Mailed copy of signature sheet for member to sign & return in SASE, uploaded to [employer] for tracking., Mailed [place] Choices signature sheet pages 3-4, Mailed Safe Medication Disposal , Mailed Transition of Care Member Handout, Submitted referrals/auths for [facility]KG, Support Plan left open per CC request or waiting member signature., and Updated services in Database.  , Provider Signature - No Support Plan Shared:  Member indicates that they do not want their support plan shared with any EW providers.    [facility]:  Emailed required CFSS documents to are.  Mailed member supplemental summary chart and assessment summary letter.     Polina Frazier  Care Management Specialist  [facility]  [phone]

## 2025-07-01 NOTE — TELEPHONE ENCOUNTER
Please see below from [facility] documentation:    Your kPa score was 35.5.  The kPa score is used to evaluate fibrosis or scarring in the liver.  Your score is consistent with a F4 stage of scarring which indicates severe scarring.   I have ordered a liver ultrasound and additional bloodwork to determine the cause of your cirrhosis. I would also like you to follow-up in our Hepatology clinic.

## 2025-07-01 NOTE — TELEPHONE ENCOUNTER
Patient sends [employer] message requesting referral for a liver specialist. Referral pended. Please advise.

## 2025-07-01 NOTE — LETTER
July 1, 2025       JACKELYN IRBY  [address] [place] [address]      Dear Jackelyn,    At [facility], we re dedicated to improving your health and wellness. Enclosed is the Support Plan developed with you on 6/24/2025. Please review the Support Plan carefully.    As a reminder, during your visit we talked about:   Ways to manage your physical and mental health   Using health care to maintain and improve your health    Your preventive care needs      Remember to contact your care coordinator if you:   Are hospitalized or plan to be hospitalized    Have a fall     Have a change in your physical or mental health   Need help finding support or services    If you have questions or don t agree with your Support Plan, call me at [phone]. You can also call me if your needs change. TTY users call the [place] [facility] at 233 or 1-680.514.8948 (cjvahe-mu-umtqdm relay service).    Sincerely,       MATT Young  [phone]  [email or web address]                [id number] accepted     (06/2024)                [facility] [place] [address]  [phone]  fax [phone]  [facility]

## 2025-07-02 NOTE — TELEPHONE ENCOUNTER
Patient unable to see hepatology with [facility] until September. Asking for new referral.    Pended.    Thank you,  Patrick, Triage RN [place]    10:10 AM 7/2/2025

## 2025-07-02 NOTE — TELEPHONE ENCOUNTER
Sent [employer] message to patient.    Thank you,  Patrick, Triage RN Pierce [place]    12:48 PM 7/2/2025

## 2025-07-02 NOTE — TELEPHONE ENCOUNTER
Sent [employer] message to patient.    Thank you,  Patrick, Triage MARY Garvey    9:59 AM 7/2/2025

## 2025-07-03 ENCOUNTER — PATIENT OUTREACH (OUTPATIENT)
Dept: CARE COORDINATION | Facility: CLINIC | Age: 75
End: 2025-07-03
Payer: COMMERCIAL

## 2025-07-07 ENCOUNTER — PATIENT OUTREACH (OUTPATIENT)
Dept: CARE COORDINATION | Facility: CLINIC | Age: 75
End: 2025-07-07

## 2025-07-10 ENCOUNTER — TRANSFERRED RECORDS (OUTPATIENT)
Dept: MULTI SPECIALTY CLINIC | Facility: CLINIC | Age: 75
End: 2025-07-10
Payer: COMMERCIAL

## 2025-07-10 LAB — HEP C HIM: NORMAL

## 2025-07-21 ENCOUNTER — TRANSFERRED RECORDS (OUTPATIENT)
Dept: ADMINISTRATIVE | Facility: CLINIC | Age: 75
End: 2025-07-21

## 2025-07-22 NOTE — PROCEDURES
2025        Jennifer Stinson   [address] [place] [address]      Jennifer Stinson,  :  1950    Blood test for alpha-1 antitrypsin deficiency is negative.  Hep B Core Ab, Tot 07/10/2025 14:36   Description Result Units Flags Range   Hep B Core Ab, Tot Negative   Negative   Comments   Performed At: [facility], [employer], [id number] Zaldivar MD, Phone: [phone]   HBsAg Screen 07/10/2025 14:36   Description Result Units Flags Range   HBsAg Screen Negative   Negative   Comments   Performed At: [facility], [employer], [id number]  Cesar Mccall MD, Phone: [phone]   Ceruloplasmin 07/10/2025 14:36   Description Result Units Flags Range   Ceruloplasmin 29.0 mg/dL  19.0-39.0   Comments   Performed At: [facility] [place], [place], [id number]  Cesar Mccall MD, Phone: [phone]   HCV Antibody 07/10/2025 14:36   Description Result Units Flags Range   Hep C Virus Ab Non Reactive   Non Reactive   Comments   Performed At: [facility] [place], [place], [id number]  Cesar Mccall MD, Phone: [phone]   Hep C Virus Ab:  HCV antibody alone does not differentiate between previously  resolved infection and active infection. Equivocal and Reactive  HCV antibody results should be followed up with an HCV RNA test  to support the diagnosis of active HCV infection.   Hep B Surface Ab, Qual 07/10/2025 14:36   Description Result Units Flags Range   Hep B Surface Ab, Qual Non Reactive      Comments   First Available First Garret holly                        Performed At: [facility], [facility]  [address] [place], [place] [address]and [place], [place], [id number] Zaldivar MD, Phone: [phone]   Hep B Surface Ab, Qual:                              Non Reactive: Not immune to HBV infection.                              Equivocal: Unable to determine if anti-HBs                                         is present at levels consistent                                          with immunity.                               Reactive: Anti-HBs concentration detected                                         at greater than 10 mIU/mL.                                         Individual is considered to be                                         immune to infection with HBV.     A1A, Quant+Genotype(Rfx Pheno) 07/10/2025 14:36   Description Result Units Flags Range   A1A Rfx to Phenotype Not Indicated      AAT, DNA Analysis Comment      Additional Information: Comment      Alpha-1-Antitrypsin, Serum 171 mg/dL  101-187   Electronically Signed by: Comment      Comments   Performed At: , [facility]  [address], [place], [id number]  Linus Beasley MD, Phone: [phone]  Performed At: , [facility]  [address], [place], [id number]  Spike Nickerson, [facility], Phone: [phone]   AAT, DNA Analysis:  Result:  c.1096 G>A (p.Qnc329Dyi), Z allele - Not detected  c.863 A>T (p.Vpz016Ozy), S allele - Not detected  Not associated with increased risk of developing clinically  relevant symptoms of alpha-1 antitrypsin deficiency. See  Additional Clinical Information and Comments.   Additional Information::  Additional Clinical Information:  Alpha-1 antitrypsin deficiency is an autosomal recessive metabolic  disorder with variable severity and age at onset. Signs and  symptoms may include increased risk for chronic obstructive lung  disease that typically manifests after age 30, liver disease, and  liver cancer. Liver disease can be present in infancy as   cholestasis (jaundice) or in adulthood as cirrhosis and fibrosis.  Lung and liver disease may be accelerated by environmental exposures  such as smoking and excessive alcohol use. Established treatments  for COPD and emphysema are used to treat lung disease; lung and/or  liver transplantation may be an option for those with with severe  disease. Intravenous augmentation therapy may be  available for  patients who meet criteria.                                                                       .  Comments:  The ZZ and SZ genotypes account for more than 95% of individuals  with severe alpha-1 antitrypsin deficiency. To rule out other  variants, further testing of symptomatic individuals heterozygous for  one variant (S or Z) or with negative results may include phenotyping  (PI typing), AAT level testing, and/or expanded genotyping.                                                                       .  Genetic counseling is recommended to discuss the potential clinical  implications of positive results, as well as recommendations for  testing family members.  Genetic Coordinators are available for health care providers to  discuss results at [phone]).                                                                       .  Test Details:  Two variants analyzed:  c.1096 G>A (p.Ujr667Kwu), commonly referred to as the Z allele or PI*Z  c.863 A>T (p.Frd443Fgb), commonly referred to as the S allele or PI*S                                                                       .  Methods/Limitations:  DNA analysis of the S and Z alleles in the SERPINA1 gene ([id number])  was performed by multiplex allele-specific PCR amplification followed  by gel electrophoresis. Results must be combined with clinical  information for the most accurate interpretation. Molecular-based  testing is highly accurate, but as in any laboratory test, rare  diagnostic errors may occur. False positive or false negative results  may occur for reasons that include genetic variants, blood  transfusions, bone marrow transplantation, somatic or tissue-specific  mosaicism, mislabeled samples, or erroneous representation of family  relationships.                                                                       .  This test was developed and its performance characteristics determined  by [employer]. It has not been  cleared or approved by the Food and  Drug Administration.                                                                       .  References:  Josh RA, Jeff G, Camilo ML, Karimi M, Jt CE, Oliveros K,  Paulina DK, Maggie SL, Dane JM, Juan BOUCHER, Deepika C, Trini J.  The Diagnosis and Management of Alpha-1 Antitrypsin Deficiency in  the Adult. Chronic Obstr Pulm Dis. 2016 Jun 6;3(3):668-682. doi:  10.15001/jcopdf.3.3.2015.0182. PMID: 66442002; PMCID: [id number].  Juan BOUCHER, Reddy V, Jeanie STAHL. Alpha-1 Antitrypsin Deficiency.  2006 Oct 27 [Updated 2020 May 21]. In: Mikey MP, Maria Teresa HH, Luis Felipe DYE,  et al., editors. Eusebio(R) [Internet]. [place] ([place]): [facility]; 8549-4589. Available from:  https://www.ncbi.nlm.nih.gov/books/SIX6268/   Electronically Signed by::  Technical Component performed at [facility] RTP  Professional Component performed by:  Migue Banuelos, PhD, [facility]  [id number], [facility], [address] [facility] [address]           Thank you.    Electronically signed by:  Ruth BRUNO 07/21/2025 12:14 PM  Document generated by:  Ruth BRUNO  07/21/2025  If your provider ordered multiple tests; the results may not become available at the same time.  If multiple test results are received within 14 days of one another, you may receive a duplicate.  cc:  Erika Luis MD

## 2025-07-28 ENCOUNTER — APPOINTMENT (OUTPATIENT)
Dept: CT IMAGING | Facility: CLINIC | Age: 75
DRG: 305 | End: 2025-07-28
Attending: EMERGENCY MEDICINE
Payer: COMMERCIAL

## 2025-07-28 ENCOUNTER — HOSPITAL ENCOUNTER (INPATIENT)
Facility: CLINIC | Age: 75
LOS: 2 days | Discharge: HOME | End: 2025-07-31
Attending: EMERGENCY MEDICINE | Admitting: STUDENT IN AN ORGANIZED HEALTH CARE EDUCATION/TRAINING PROGRAM
Payer: COMMERCIAL

## 2025-07-28 ENCOUNTER — OFFICE VISIT (OUTPATIENT)
Dept: FAMILY MEDICINE | Facility: CLINIC | Age: 75
End: 2025-07-28
Payer: COMMERCIAL

## 2025-07-28 VITALS
RESPIRATION RATE: 18 BRPM | SYSTOLIC BLOOD PRESSURE: 187 MMHG | HEIGHT: 66 IN | OXYGEN SATURATION: 96 % | DIASTOLIC BLOOD PRESSURE: 81 MMHG | HEART RATE: 76 BPM | TEMPERATURE: 99.4 F | BODY MASS INDEX: 31.64 KG/M2 | WEIGHT: 196.9 LBS

## 2025-07-28 DIAGNOSIS — R50.9 FEVER, UNSPECIFIED FEVER CAUSE: ICD-10-CM

## 2025-07-28 DIAGNOSIS — R10.9 LEFT SIDED ABDOMINAL PAIN: ICD-10-CM

## 2025-07-28 DIAGNOSIS — R10.12 ABDOMINAL PAIN, LEFT UPPER QUADRANT: ICD-10-CM

## 2025-07-28 DIAGNOSIS — R07.9 CHEST PAIN, UNSPECIFIED TYPE: Primary | ICD-10-CM

## 2025-07-28 DIAGNOSIS — I16.0 HYPERTENSIVE URGENCY: ICD-10-CM

## 2025-07-28 DIAGNOSIS — R51.9 HEADACHE, UNSPECIFIED HEADACHE TYPE: Primary | ICD-10-CM

## 2025-07-28 LAB
ALBUMIN SERPL BCG-MCNC: 3.2 G/DL (ref 3.5–5.2)
ALP SERPL-CCNC: ABNORMAL U/L
ALT SERPL W P-5'-P-CCNC: ABNORMAL U/L
ANION GAP SERPL CALCULATED.3IONS-SCNC: 8 MMOL/L (ref 7–15)
AST SERPL W P-5'-P-CCNC: ABNORMAL U/L
ATRIAL RATE - MUSE: 78 BPM
BASOPHILS # BLD AUTO: 0 10E3/UL (ref 0–0.2)
BASOPHILS NFR BLD AUTO: 1 %
BILIRUB SERPL-MCNC: 0.5 MG/DL
BUN SERPL-MCNC: 10.2 MG/DL (ref 8–23)
CALCIUM SERPL-MCNC: 8.5 MG/DL (ref 8.8–10.4)
CHLORIDE SERPL-SCNC: 99 MMOL/L (ref 98–107)
CREAT SERPL-MCNC: 0.61 MG/DL (ref 0.51–0.95)
DIASTOLIC BLOOD PRESSURE - MUSE: NORMAL MMHG
EGFRCR SERPLBLD CKD-EPI 2021: >90 ML/MIN/1.73M2
EOSINOPHIL # BLD AUTO: 0.1 10E3/UL (ref 0–0.7)
EOSINOPHIL NFR BLD AUTO: 2 %
ERYTHROCYTE [DISTWIDTH] IN BLOOD BY AUTOMATED COUNT: 14.2 % (ref 10–15)
GLUCOSE SERPL-MCNC: 99 MG/DL (ref 70–99)
HCO3 SERPL-SCNC: 26 MMOL/L (ref 22–29)
HCT VFR BLD AUTO: 39.8 % (ref 35–47)
HGB BLD-MCNC: 14 G/DL (ref 11.7–15.7)
HOLD SPECIMEN: NORMAL
IMM GRANULOCYTES # BLD: 0.1 10E3/UL
IMM GRANULOCYTES NFR BLD: 1 %
INTERPRETATION ECG - MUSE: NORMAL
LIPASE SERPL-CCNC: 30 U/L (ref 13–60)
LYMPHOCYTES # BLD AUTO: 1.4 10E3/UL (ref 0.8–5.3)
LYMPHOCYTES NFR BLD AUTO: 24 %
MCH RBC QN AUTO: 30.7 PG (ref 26.5–33)
MCHC RBC AUTO-ENTMCNC: 35.2 G/DL (ref 31.5–36.5)
MCV RBC AUTO: 87 FL (ref 78–100)
MONOCYTES # BLD AUTO: 0.8 10E3/UL (ref 0–1.3)
MONOCYTES NFR BLD AUTO: 14 %
NEUTROPHILS # BLD AUTO: 3.6 10E3/UL (ref 1.6–8.3)
NEUTROPHILS NFR BLD AUTO: 59 %
NRBC # BLD AUTO: 0 10E3/UL
NRBC BLD AUTO-RTO: 0 /100
NT-PROBNP SERPL-MCNC: 243 PG/ML (ref 0–353)
P AXIS - MUSE: 50 DEGREES
PLAT MORPH BLD: NORMAL
PLATELET # BLD AUTO: 100 10E3/UL (ref 150–450)
POTASSIUM SERPL-SCNC: 4.5 MMOL/L (ref 3.4–5.3)
PR INTERVAL - MUSE: 156 MS
PROT SERPL-MCNC: 6.8 G/DL (ref 6.4–8.3)
QRS DURATION - MUSE: 74 MS
QT - MUSE: 384 MS
QTC - MUSE: 437 MS
R AXIS - MUSE: 10 DEGREES
RBC # BLD AUTO: 4.56 10E6/UL (ref 3.8–5.2)
RBC MORPH BLD: NORMAL
SODIUM SERPL-SCNC: 133 MMOL/L (ref 135–145)
SYSTOLIC BLOOD PRESSURE - MUSE: NORMAL MMHG
T AXIS - MUSE: 66 DEGREES
TROPONIN T SERPL HS-MCNC: 8 NG/L
TROPONIN T SERPL HS-MCNC: 8 NG/L
VENTRICULAR RATE- MUSE: 78 BPM
WBC # BLD AUTO: 6.1 10E3/UL (ref 4–11)

## 2025-07-28 PROCEDURE — 250N000013 HC RX MED GY IP 250 OP 250 PS 637: Performed by: EMERGENCY MEDICINE

## 2025-07-28 PROCEDURE — 99215 OFFICE O/P EST HI 40 MIN: CPT | Performed by: NURSE PRACTITIONER

## 2025-07-28 PROCEDURE — 93000 ELECTROCARDIOGRAM COMPLETE: CPT | Performed by: NURSE PRACTITIONER

## 2025-07-28 PROCEDURE — 3079F DIAST BP 80-89 MM HG: CPT | Performed by: NURSE PRACTITIONER

## 2025-07-28 PROCEDURE — 1125F AMNT PAIN NOTED PAIN PRSNT: CPT | Performed by: NURSE PRACTITIONER

## 2025-07-28 PROCEDURE — 93005 ELECTROCARDIOGRAM TRACING: CPT

## 2025-07-28 PROCEDURE — 84155 ASSAY OF PROTEIN SERUM: CPT | Performed by: EMERGENCY MEDICINE

## 2025-07-28 PROCEDURE — 36415 COLL VENOUS BLD VENIPUNCTURE: CPT | Performed by: EMERGENCY MEDICINE

## 2025-07-28 PROCEDURE — 74176 CT ABD & PELVIS W/O CONTRAST: CPT

## 2025-07-28 PROCEDURE — 99285 EMERGENCY DEPT VISIT HI MDM: CPT | Mod: 25 | Performed by: EMERGENCY MEDICINE

## 2025-07-28 PROCEDURE — 3077F SYST BP >= 140 MM HG: CPT | Performed by: NURSE PRACTITIONER

## 2025-07-28 PROCEDURE — 83880 ASSAY OF NATRIURETIC PEPTIDE: CPT | Performed by: EMERGENCY MEDICINE

## 2025-07-28 PROCEDURE — 85025 COMPLETE CBC W/AUTO DIFF WBC: CPT | Performed by: EMERGENCY MEDICINE

## 2025-07-28 PROCEDURE — 83690 ASSAY OF LIPASE: CPT | Performed by: EMERGENCY MEDICINE

## 2025-07-28 PROCEDURE — 84484 ASSAY OF TROPONIN QUANT: CPT | Performed by: EMERGENCY MEDICINE

## 2025-07-28 RX ORDER — SUCRALFATE ORAL 1 G/10ML
1000 SUSPENSION ORAL
Status: ON HOLD | COMMUNITY
Start: 2024-05-01 | End: 2025-07-29

## 2025-07-28 RX ORDER — ACETAMINOPHEN 325 MG/1
975 TABLET ORAL ONCE
Status: COMPLETED | OUTPATIENT
Start: 2025-07-28 | End: 2025-07-28

## 2025-07-28 RX ORDER — AMLODIPINE BESYLATE 5 MG/1
10 TABLET ORAL ONCE
Status: COMPLETED | OUTPATIENT
Start: 2025-07-28 | End: 2025-07-28

## 2025-07-28 RX ADMIN — ACETAMINOPHEN 975 MG: 325 TABLET ORAL at 19:47

## 2025-07-28 RX ADMIN — AMLODIPINE BESYLATE 10 MG: 5 TABLET ORAL at 19:47

## 2025-07-28 SDOH — HEALTH STABILITY: PHYSICAL HEALTH
ON AVERAGE, HOW MANY DAYS PER WEEK DO YOU ENGAGE IN MODERATE TO STRENUOUS EXERCISE (LIKE A BRISK WALK)?: PATIENT DECLINED

## 2025-07-28 ASSESSMENT — ENCOUNTER SYMPTOMS
VOMITING: 0
CHILLS: 1
ABDOMINAL PAIN: 1
SHORTNESS OF BREATH: 1
FEVER: 1
SORE THROAT: 1
PALPITATIONS: 0
ACTIVITY CHANGE: 1
MYALGIAS: 1
NAUSEA: 1
FATIGUE: 1
COUGH: 0

## 2025-07-28 ASSESSMENT — ACTIVITIES OF DAILY LIVING (ADL)
ADLS_ACUITY_SCORE: 42

## 2025-07-28 ASSESSMENT — COLUMBIA-SUICIDE SEVERITY RATING SCALE - C-SSRS
2. HAVE YOU ACTUALLY HAD ANY THOUGHTS OF KILLING YOURSELF IN THE PAST MONTH?: NO
1. IN THE PAST MONTH, HAVE YOU WISHED YOU WERE DEAD OR WISHED YOU COULD GO TO SLEEP AND NOT WAKE UP?: NO
6. HAVE YOU EVER DONE ANYTHING, STARTED TO DO ANYTHING, OR PREPARED TO DO ANYTHING TO END YOUR LIFE?: NO

## 2025-07-28 ASSESSMENT — PAIN SCALES - GENERAL: PAINLEVEL_OUTOF10: SEVERE PAIN (8)

## 2025-07-28 ASSESSMENT — SOCIAL DETERMINANTS OF HEALTH (SDOH): HOW OFTEN DO YOU GET TOGETHER WITH FRIENDS OR RELATIVES?: MORE THAN THREE TIMES A WEEK

## 2025-07-28 NOTE — ED TRIAGE NOTES
she has begun to have chest pain with activity with shortness of breath this morning. She is also having left-sided upper abdominal pain; 1 SL nitroglycerin and 324 ASA was given by medics; no change in sx       Triage Assessment (Adult)       Row Name 07/28/25 1846          Triage Assessment    Airway WDL WDL        Respiratory WDL    Respiratory WDL X;rhythm/pattern     Rhythm/Pattern, Respiratory pattern regular;depth regular;unlabored;dyspnea upon exertion        Skin Circulation/Temperature WDL    Skin Circulation/Temperature WDL WDL        Cardiac WDL    Cardiac WDL X;chest pain        Chest Pain Assessment    Chest Pain Location midsternal     Chest Pain Radiation shoulder        Peripheral/Neurovascular WDL    Peripheral Neurovascular WDL WDL        Cognitive/Neuro/Behavioral WDL    Cognitive/Neuro/Behavioral WDL WDL

## 2025-07-28 NOTE — PROGRESS NOTES
Assessment & Plan     ICD-10-CM    1. Chest pain, unspecified type    EKG SR, non-specific changes  Hypertensive 180/ R07.9 EKG 12-lead complete w/read - Clinics      2. Abdominal pain, left upper quadrant    No guarding or rebound, but states pain is 8/10   Has endoscopy scheduled soon for evaluation per family. History of elevated LFT's R10.12       3. Fever, unspecified fever cause    New onset of fever in setting of increasing abdominal pain today., R50.9         Ambulance here to take pt for further evaluation.  Family will accompany for interpreting and also follow in car      There are no Patient Instructions on file for this visit.  KEN Quezada CNP  M [facility] [address] [place]  ============================================  Subjective      Patient presents today for a preop examination for upcoming cataract surgery in 6 days.  However she has begun to have chest pain with activity today and shortness of breath.  She is also having left-sided upper abdominal pain that is 8/10 right now.  She is having a low-grade fever today.  She is here with her daughter who speaks English but we have an  present as well.  Patient is uncomfortable and we discussed further evaluation at the emergency department.  Patient daughter does not feel comfortable traveling to the ER with patient and request ambulance service.      Reviewed by Provider at this visit      Problems            Review of Systems   Constitutional:  Positive for activity change, chills, fatigue and fever.   HENT:  Positive for sore throat.    Respiratory:  Positive for shortness of breath. Negative for cough.    Cardiovascular:  Positive for chest pain. Negative for palpitations and peripheral edema.   Gastrointestinal:  Positive for abdominal pain and nausea. Negative for vomiting.   Musculoskeletal:  Positive for myalgias.   Skin:  Negative for rash.     ============================================  Objective    BP (!)  "187/81 (BP Location: Right arm, Patient Position: Sitting, Cuff Size: Adult Regular)   Pulse 76   Temp 99.4  F (37.4  C) (Oral)   Resp 18   Ht 1.676 m (5' 6\")   Wt 89.3 kg (196 lb 14.4 oz)   LMP  (LMP Unknown)   SpO2 96%   BMI 31.78 kg/m    Physical Exam  Vitals reviewed: daughter and another family member present.   Constitutional:       Appearance: She is ill-appearing.      Comments: Appears significantly uncomfortable.  Appears fatigued.   Cardiovascular:      Rate and Rhythm: Normal rate and regular rhythm.      Heart sounds: Normal heart sounds.   Pulmonary:      Effort: Pulmonary effort is normal.   Abdominal:      General: Bowel sounds are normal.      Palpations: Abdomen is soft.      Tenderness: There is no guarding or rebound.      Comments: LUQ tenderness, mild   Skin:     General: Skin is warm and dry.   Neurological:      Mental Status: She is alert.        ============================================  KEN Quezada CNP  Signed Electronically by: KEN Quezada CNP          "

## 2025-07-28 NOTE — ED NOTES
Bed: ED24  Expected date:   Expected time:   Means of arrival:   Comments:  2041 75f CHEST PAIN 219/64

## 2025-07-29 ENCOUNTER — APPOINTMENT (OUTPATIENT)
Dept: NUCLEAR MEDICINE | Facility: CLINIC | Age: 75
DRG: 305 | End: 2025-07-29
Attending: INTERNAL MEDICINE
Payer: COMMERCIAL

## 2025-07-29 ENCOUNTER — APPOINTMENT (OUTPATIENT)
Dept: PHYSICAL THERAPY | Facility: CLINIC | Age: 75
DRG: 305 | End: 2025-07-29
Attending: INTERNAL MEDICINE
Payer: COMMERCIAL

## 2025-07-29 ENCOUNTER — APPOINTMENT (OUTPATIENT)
Dept: GENERAL RADIOLOGY | Facility: CLINIC | Age: 75
DRG: 305 | End: 2025-07-29
Attending: HOSPITALIST
Payer: COMMERCIAL

## 2025-07-29 ENCOUNTER — APPOINTMENT (OUTPATIENT)
Dept: CT IMAGING | Facility: CLINIC | Age: 75
DRG: 305 | End: 2025-07-29
Attending: EMERGENCY MEDICINE
Payer: COMMERCIAL

## 2025-07-29 PROBLEM — R07.9 CHEST PAIN: Status: ACTIVE | Noted: 2025-07-29

## 2025-07-29 LAB
ALBUMIN SERPL BCG-MCNC: 3.1 G/DL (ref 3.5–5.2)
ALBUMIN UR-MCNC: NEGATIVE MG/DL
ALP SERPL-CCNC: 204 U/L (ref 40–150)
ALT SERPL W P-5'-P-CCNC: 28 U/L (ref 0–50)
APPEARANCE UR: CLEAR
AST SERPL W P-5'-P-CCNC: 46 U/L (ref 0–45)
BILIRUB DIRECT SERPL-MCNC: 0.2 MG/DL (ref 0–0.3)
BILIRUB SERPL-MCNC: 0.5 MG/DL
BILIRUB UR QL STRIP: NEGATIVE
COLOR UR AUTO: NORMAL
CV STRESS MAX HR HE: 106
GLUCOSE UR STRIP-MCNC: NEGATIVE MG/DL
HGB UR QL STRIP: NEGATIVE
KETONES UR STRIP-MCNC: NEGATIVE MG/DL
LACTATE SERPL-SCNC: 1.7 MMOL/L (ref 0.7–2)
LEUKOCYTE ESTERASE UR QL STRIP: NEGATIVE
NITRATE UR QL: NEGATIVE
PH UR STRIP: 6.5 [PH] (ref 5–7)
PROT SERPL-MCNC: 6.2 G/DL (ref 6.4–8.3)
RATE PRESSURE PRODUCT: NORMAL
SP GR UR STRIP: 1 (ref 1–1.03)
STRESS ECHO BASELINE DIASTOLIC HE: 70
STRESS ECHO BASELINE HR: 87 BPM
STRESS ECHO BASELINE SYSTOLIC BP: 128
STRESS ECHO CALCULATED PERCENT HR: 72 %
STRESS ECHO LAST STRESS DIASTOLIC BP: 64
STRESS ECHO LAST STRESS SYSTOLIC BP: 132
STRESS ECHO TARGET HR: 147
STRESS/REST PERFUSION RATIO: 1.19
UROBILINOGEN UR STRIP-MCNC: NORMAL MG/DL

## 2025-07-29 PROCEDURE — 81003 URINALYSIS AUTO W/O SCOPE: CPT | Performed by: EMERGENCY MEDICINE

## 2025-07-29 PROCEDURE — 78452 HT MUSCLE IMAGE SPECT MULT: CPT | Mod: 26 | Performed by: INTERNAL MEDICINE

## 2025-07-29 PROCEDURE — 99223 1ST HOSP IP/OBS HIGH 75: CPT | Performed by: INTERNAL MEDICINE

## 2025-07-29 PROCEDURE — 250N000013 HC RX MED GY IP 250 OP 250 PS 637: Performed by: HOSPITALIST

## 2025-07-29 PROCEDURE — 120N000001 HC R&B MED SURG/OB

## 2025-07-29 PROCEDURE — 93016 CV STRESS TEST SUPVJ ONLY: CPT | Performed by: INTERNAL MEDICINE

## 2025-07-29 PROCEDURE — 36415 COLL VENOUS BLD VENIPUNCTURE: CPT | Performed by: INTERNAL MEDICINE

## 2025-07-29 PROCEDURE — 93017 CV STRESS TEST TRACING ONLY: CPT

## 2025-07-29 PROCEDURE — 80076 HEPATIC FUNCTION PANEL: CPT | Performed by: INTERNAL MEDICINE

## 2025-07-29 PROCEDURE — 99207 PR APP CREDIT; MD BILLING SHARED VISIT: CPT | Performed by: STUDENT IN AN ORGANIZED HEALTH CARE EDUCATION/TRAINING PROGRAM

## 2025-07-29 PROCEDURE — G0378 HOSPITAL OBSERVATION PER HR: HCPCS

## 2025-07-29 PROCEDURE — 99418 PROLNG IP/OBS E/M EA 15 MIN: CPT | Performed by: INTERNAL MEDICINE

## 2025-07-29 PROCEDURE — 250N000013 HC RX MED GY IP 250 OP 250 PS 637: Performed by: STUDENT IN AN ORGANIZED HEALTH CARE EDUCATION/TRAINING PROGRAM

## 2025-07-29 PROCEDURE — 70450 CT HEAD/BRAIN W/O DYE: CPT

## 2025-07-29 PROCEDURE — 96374 THER/PROPH/DIAG INJ IV PUSH: CPT

## 2025-07-29 PROCEDURE — 250N000011 HC RX IP 250 OP 636: Performed by: INTERNAL MEDICINE

## 2025-07-29 PROCEDURE — 97161 PT EVAL LOW COMPLEX 20 MIN: CPT | Mod: GP | Performed by: PHYSICAL THERAPIST

## 2025-07-29 PROCEDURE — 343N000001 HC RX 343 MED OP 636: Performed by: INTERNAL MEDICINE

## 2025-07-29 PROCEDURE — 93018 CV STRESS TEST I&R ONLY: CPT | Performed by: INTERNAL MEDICINE

## 2025-07-29 PROCEDURE — 96375 TX/PRO/DX INJ NEW DRUG ADDON: CPT

## 2025-07-29 PROCEDURE — 83605 ASSAY OF LACTIC ACID: CPT | Performed by: INTERNAL MEDICINE

## 2025-07-29 PROCEDURE — 250N000013 HC RX MED GY IP 250 OP 250 PS 637: Performed by: INTERNAL MEDICINE

## 2025-07-29 PROCEDURE — 78452 HT MUSCLE IMAGE SPECT MULT: CPT

## 2025-07-29 PROCEDURE — A9500 TC99M SESTAMIBI: HCPCS | Performed by: INTERNAL MEDICINE

## 2025-07-29 PROCEDURE — 71045 X-RAY EXAM CHEST 1 VIEW: CPT

## 2025-07-29 RX ORDER — METOPROLOL SUCCINATE 50 MG/1
50 TABLET, EXTENDED RELEASE ORAL DAILY
Status: DISCONTINUED | OUTPATIENT
Start: 2025-07-29 | End: 2025-07-30

## 2025-07-29 RX ORDER — PROCHLORPERAZINE MALEATE 5 MG/1
5 TABLET ORAL EVERY 6 HOURS PRN
Status: DISCONTINUED | OUTPATIENT
Start: 2025-07-29 | End: 2025-07-31 | Stop reason: HOSPADM

## 2025-07-29 RX ORDER — AMLODIPINE BESYLATE 10 MG/1
10 TABLET ORAL DAILY
Status: DISCONTINUED | OUTPATIENT
Start: 2025-07-29 | End: 2025-07-31 | Stop reason: HOSPADM

## 2025-07-29 RX ORDER — ACETAMINOPHEN 650 MG/1
650 SUPPOSITORY RECTAL EVERY 4 HOURS PRN
Status: DISCONTINUED | OUTPATIENT
Start: 2025-07-29 | End: 2025-07-31 | Stop reason: HOSPADM

## 2025-07-29 RX ORDER — ONDANSETRON 4 MG/1
4 TABLET, ORALLY DISINTEGRATING ORAL EVERY 6 HOURS PRN
Status: DISCONTINUED | OUTPATIENT
Start: 2025-07-29 | End: 2025-07-31 | Stop reason: HOSPADM

## 2025-07-29 RX ORDER — LISINOPRIL 40 MG/1
40 TABLET ORAL DAILY
Status: DISCONTINUED | OUTPATIENT
Start: 2025-07-29 | End: 2025-07-31 | Stop reason: HOSPADM

## 2025-07-29 RX ORDER — LEVOTHYROXINE SODIUM 88 UG/1
88 TABLET ORAL DAILY
Status: DISCONTINUED | OUTPATIENT
Start: 2025-07-29 | End: 2025-07-31 | Stop reason: HOSPADM

## 2025-07-29 RX ORDER — AMOXICILLIN 250 MG
2 CAPSULE ORAL 2 TIMES DAILY PRN
Status: DISCONTINUED | OUTPATIENT
Start: 2025-07-29 | End: 2025-07-31 | Stop reason: HOSPADM

## 2025-07-29 RX ORDER — CAFFEINE 200 MG
200 TABLET ORAL
Status: DISCONTINUED | OUTPATIENT
Start: 2025-07-29 | End: 2025-07-29

## 2025-07-29 RX ORDER — ATORVASTATIN CALCIUM 40 MG/1
40 TABLET, FILM COATED ORAL DAILY
Status: DISCONTINUED | OUTPATIENT
Start: 2025-07-29 | End: 2025-07-31 | Stop reason: HOSPADM

## 2025-07-29 RX ORDER — HYDRALAZINE HYDROCHLORIDE 20 MG/ML
10 INJECTION INTRAMUSCULAR; INTRAVENOUS EVERY 4 HOURS PRN
Status: DISCONTINUED | OUTPATIENT
Start: 2025-07-29 | End: 2025-07-31 | Stop reason: HOSPADM

## 2025-07-29 RX ORDER — ONDANSETRON 2 MG/ML
4 INJECTION INTRAMUSCULAR; INTRAVENOUS EVERY 6 HOURS PRN
Status: DISCONTINUED | OUTPATIENT
Start: 2025-07-29 | End: 2025-07-31 | Stop reason: HOSPADM

## 2025-07-29 RX ORDER — NITROGLYCERIN 0.4 MG/1
0.4 TABLET SUBLINGUAL EVERY 5 MIN PRN
Status: ACTIVE | OUTPATIENT
Start: 2025-07-29 | End: 2025-07-29

## 2025-07-29 RX ORDER — SUCRALFATE ORAL 1 G/10ML
1000 SUSPENSION ORAL
Status: DISCONTINUED | OUTPATIENT
Start: 2025-07-29 | End: 2025-07-29

## 2025-07-29 RX ORDER — HYDROCHLOROTHIAZIDE 12.5 MG/1
12.5 CAPSULE ORAL DAILY
Status: DISCONTINUED | OUTPATIENT
Start: 2025-07-29 | End: 2025-07-31 | Stop reason: HOSPADM

## 2025-07-29 RX ORDER — PANTOPRAZOLE SODIUM 40 MG/1
40 TABLET, DELAYED RELEASE ORAL
Status: DISCONTINUED | OUTPATIENT
Start: 2025-07-29 | End: 2025-07-31 | Stop reason: HOSPADM

## 2025-07-29 RX ORDER — REGADENOSON 0.08 MG/ML
0.4 INJECTION, SOLUTION INTRAVENOUS ONCE
Status: COMPLETED | OUTPATIENT
Start: 2025-07-29 | End: 2025-07-29

## 2025-07-29 RX ORDER — ALBUTEROL SULFATE 90 UG/1
2 INHALANT RESPIRATORY (INHALATION) EVERY 5 MIN PRN
Status: DISCONTINUED | OUTPATIENT
Start: 2025-07-29 | End: 2025-07-29

## 2025-07-29 RX ORDER — ASPIRIN 81 MG/1
81 TABLET ORAL DAILY
COMMUNITY

## 2025-07-29 RX ORDER — CLONIDINE HYDROCHLORIDE 0.1 MG/1
0.1 TABLET ORAL EVERY 4 HOURS PRN
Status: DISCONTINUED | OUTPATIENT
Start: 2025-07-29 | End: 2025-07-31 | Stop reason: HOSPADM

## 2025-07-29 RX ORDER — SENNOSIDES 8.6 MG
325 CAPSULE ORAL DAILY
Status: DISCONTINUED | OUTPATIENT
Start: 2025-07-29 | End: 2025-07-29

## 2025-07-29 RX ORDER — ACETAMINOPHEN 325 MG/1
650 TABLET ORAL EVERY 4 HOURS PRN
Status: DISCONTINUED | OUTPATIENT
Start: 2025-07-29 | End: 2025-07-31 | Stop reason: HOSPADM

## 2025-07-29 RX ORDER — AMOXICILLIN 250 MG
1 CAPSULE ORAL 2 TIMES DAILY PRN
Status: DISCONTINUED | OUTPATIENT
Start: 2025-07-29 | End: 2025-07-31 | Stop reason: HOSPADM

## 2025-07-29 RX ORDER — AMINOPHYLLINE 25 MG/ML
50-100 INJECTION, SOLUTION INTRAVENOUS
Status: DISCONTINUED | OUTPATIENT
Start: 2025-07-29 | End: 2025-07-29

## 2025-07-29 RX ORDER — LABETALOL HYDROCHLORIDE 5 MG/ML
10 INJECTION, SOLUTION INTRAVENOUS
Status: DISCONTINUED | OUTPATIENT
Start: 2025-07-29 | End: 2025-07-31 | Stop reason: HOSPADM

## 2025-07-29 RX ORDER — SODIUM CHLORIDE 9 MG/ML
INJECTION, SOLUTION INTRAVENOUS CONTINUOUS PRN
Status: ACTIVE | OUTPATIENT
Start: 2025-07-29 | End: 2025-07-29

## 2025-07-29 RX ORDER — CAFFEINE CITRATE 20 MG/ML
60 SOLUTION INTRAVENOUS
Status: DISCONTINUED | OUTPATIENT
Start: 2025-07-29 | End: 2025-07-29

## 2025-07-29 RX ORDER — LIDOCAINE 40 MG/G
CREAM TOPICAL
Status: DISCONTINUED | OUTPATIENT
Start: 2025-07-29 | End: 2025-07-31 | Stop reason: HOSPADM

## 2025-07-29 RX ORDER — CODEINE PHOSPHATE AND GUAIFENESIN 10; 100 MG/5ML; MG/5ML
5 SOLUTION ORAL EVERY 4 HOURS PRN
Status: DISCONTINUED | OUTPATIENT
Start: 2025-07-29 | End: 2025-07-31 | Stop reason: HOSPADM

## 2025-07-29 RX ORDER — ASPIRIN 81 MG/1
81 TABLET ORAL DAILY
Status: DISCONTINUED | OUTPATIENT
Start: 2025-07-30 | End: 2025-07-31 | Stop reason: HOSPADM

## 2025-07-29 RX ADMIN — ACETAMINOPHEN 650 MG: 325 TABLET, FILM COATED ORAL at 01:52

## 2025-07-29 RX ADMIN — AMLODIPINE BESYLATE 10 MG: 10 TABLET ORAL at 08:33

## 2025-07-29 RX ADMIN — ATORVASTATIN CALCIUM 40 MG: 40 TABLET, FILM COATED ORAL at 08:34

## 2025-07-29 RX ADMIN — TECHNETIUM TC 99M SESTAMIBI 30.4 MILLICURIE: 1 INJECTION INTRAVENOUS at 10:15

## 2025-07-29 RX ADMIN — LEVOTHYROXINE SODIUM 88 MCG: 0.09 TABLET ORAL at 08:34

## 2025-07-29 RX ADMIN — GUAIFENESIN AND CODEINE PHOSPHATE 5 ML: 100; 10 SOLUTION ORAL at 21:43

## 2025-07-29 RX ADMIN — CLONIDINE HYDROCHLORIDE 0.1 MG: 0.1 TABLET ORAL at 02:17

## 2025-07-29 RX ADMIN — HYDRALAZINE HYDROCHLORIDE 10 MG: 20 INJECTION INTRAMUSCULAR; INTRAVENOUS at 01:38

## 2025-07-29 RX ADMIN — PANTOPRAZOLE SODIUM 40 MG: 40 TABLET, DELAYED RELEASE ORAL at 08:34

## 2025-07-29 RX ADMIN — ASPIRIN 325 MG: 325 TABLET, COATED ORAL at 08:34

## 2025-07-29 RX ADMIN — CLONIDINE HYDROCHLORIDE 0.1 MG: 0.1 TABLET ORAL at 18:54

## 2025-07-29 RX ADMIN — METOPROLOL SUCCINATE 50 MG: 50 TABLET, EXTENDED RELEASE ORAL at 11:06

## 2025-07-29 RX ADMIN — LABETALOL HYDROCHLORIDE 10 MG: 5 INJECTION, SOLUTION INTRAVENOUS at 01:09

## 2025-07-29 RX ADMIN — LISINOPRIL 40 MG: 40 TABLET ORAL at 08:33

## 2025-07-29 RX ADMIN — ACETAMINOPHEN 650 MG: 325 TABLET, FILM COATED ORAL at 08:43

## 2025-07-29 RX ADMIN — REGADENOSON 0.4 MG: 0.08 INJECTION, SOLUTION INTRAVENOUS at 10:06

## 2025-07-29 RX ADMIN — TECHNETIUM TC 99M SESTAMIBI 11.4 MILLICURIE: 1 INJECTION INTRAVENOUS at 08:13

## 2025-07-29 RX ADMIN — ACETAMINOPHEN 650 MG: 325 TABLET, FILM COATED ORAL at 23:27

## 2025-07-29 RX ADMIN — LABETALOL HYDROCHLORIDE 10 MG: 5 INJECTION, SOLUTION INTRAVENOUS at 17:58

## 2025-07-29 RX ADMIN — HYDROCHLOROTHIAZIDE 12.5 MG: 12.5 CAPSULE ORAL at 08:34

## 2025-07-29 RX ADMIN — ACETAMINOPHEN 650 MG: 325 TABLET, FILM COATED ORAL at 17:54

## 2025-07-29 RX ADMIN — PROCHLORPERAZINE MALEATE 5 MG: 5 TABLET ORAL at 08:44

## 2025-07-29 ASSESSMENT — ACTIVITIES OF DAILY LIVING (ADL)
ADLS_ACUITY_SCORE: 42

## 2025-07-29 NOTE — PROVIDER NOTIFICATION
MD Notification    Notified Person: MD    Notified Person Name: Stepan    Notification Date/Time: 7/29 0212    Notification Interaction: vocmatilda    Purpose of Notification: Pts last /99. She has received both Hydralazine and Labetalol and neither are available to give again. Are you ok with this or do you want me to give another dose?    Orders Received: Clonidine ordered    Comments:

## 2025-07-29 NOTE — ED NOTES
[facility]  ED Nurse Handoff Report    ED Chief complaint: Chest Pain      ED Diagnosis:   Final diagnoses:   Headache, unspecified headache type   Left sided abdominal pain   Hypertensive urgency       Code Status: confirm w/ hospitalist    Allergies:   Allergies   Allergen Reactions    Eggs Or Egg-Derived Products [Chicken-Derived Products (Egg)]     No Clinical Screening - See Comments Swelling and Rash     Other reaction(s): Edema  All meat: poultry, beef, etc.  Reaction: swelling  Cinnamon and other spices  Reaction: face swelling, rash  Cinnamon and other spices  Reaction: face swelling, rash  All meat: poultry, beef, etc.  Reaction: swelling    Nuts Other (See Comments) and Rash     All nuts  Reaction: swelling, rash, headache    Peanut-Containing Drug Products Rash     Other reaction(s): Headache  All nuts  Reaction: swelling, rash, headache       Patient Story: Pt w/ multiple complaints: SOB, chest pain, headache, LUQ abdominal pain. Rash/spider bite to elbow - family believes this may be causing her symptoms   Focused Assessment:    Respiratory: 96% on room air  Cardiac: HTN, /89, HR 70s  Neuro: A&Ox4, family interpreting  GI/: voiding  Diet: Regular  Pain: headache currently     Treatments and/or interventions provided:   Abd/pelvis CT no contrast - [facility] Protocol   Final Result   IMPRESSION:       1.  Interval development of cirrhosis. No abdominal ascites.      2.  Colonic diverticulosis without acute diverticulitis.         Head CT w/o contrast    (Results Pending)      Labs Ordered and Resulted from Time of ED Arrival to Time of ED Departure   COMPREHENSIVE METABOLIC PANEL (LIMITED OCCURRENCES) - Abnormal       Result Value    Sodium 133 (*)     Potassium 4.5      Carbon Dioxide (CO2) 26      Anion Gap 8      Urea Nitrogen 10.2      Creatinine 0.61      GFR Estimate >90      Calcium 8.5 (*)     Chloride 99      Glucose 99      Alkaline Phosphatase        AST        ALT         Protein Total 6.8      Albumin 3.2 (*)     Bilirubin Total 0.5     CBC WITH PLATELETS AND DIFFERENTIAL - Abnormal    WBC Count 6.1      RBC Count 4.56      Hemoglobin 14.0      Hematocrit 39.8      MCV 87      MCH 30.7      MCHC 35.2      RDW 14.2      Platelet Count 100 (*)     % Neutrophils 59      % Lymphocytes 24      % Monocytes 14      % Eosinophils 2      % Basophils 1      % Immature Granulocytes 1      NRBCs per 100 WBC 0      Absolute Neutrophils 3.6      Absolute Lymphocytes 1.4      Absolute Monocytes 0.8      Absolute Eosinophils 0.1      Absolute Basophils 0.0      Absolute Immature Granulocytes 0.1      Absolute NRBCs 0.0     TROPONIN T, HIGH SENSITIVITY - Normal    Troponin T, High Sensitivity 8     LIPASE - Normal    Lipase 30     NT-PROBNP - Normal    NT-proBNP 243     TROPONIN T, HIGH SENSITIVITY - Normal    Troponin T, High Sensitivity 8     RBC AND PLATELET MORPHOLOGY    RBC Morphology Confirmed RBC Indices      Platelet Assessment        Value: Automated Count Confirmed. Platelet morphology is normal.   UA MACROSCOPIC WITH REFLEX TO MICRO AND CULTURE       Patient's response to treatments and/or interventions: stable    To be done/followed up on inpatient unit:  pain management    Does this patient have any cognitive concerns?: none    Activity level - Baseline/Home:  Independent  Activity Level - Current:   Stand with Assist    Patient's Preferred language: [facility]   Needed?: No    Isolation: None  Infection: Not Applicable  Sepsis treatment initiated: No  Patient tested for COVID 19 prior to admission: NO  Bariatric?: No    Vital Signs:   Vitals:    07/28/25 2015 07/28/25 2100 07/28/25 2300 07/28/25 2330   BP: (!) 195/88 (!) 180/86 (!) 192/103 (!) 192/89   Pulse: 84 81 73 80   Resp: 14 22 11 18   Temp:       TempSrc:       SpO2: 95% 96% 98% 97%       Cardiac Rhythm:     Was the PSS-3 completed:   Yes  What interventions are required if any?               Family Comments: family @  bedside  OBS brochure/video discussed/provided to patient/family: Yes              Name of person given brochure if not patient:               Relationship to patient:     For the majority of the shift this patient's behavior was Green.   Behavioral interventions performed were rounding.    ED NURSE PHONE NUMBER: *[phone]

## 2025-07-29 NOTE — H&P
[facility]    History and Physical - Hospitalist Service       Date of Admission:  7/28/2025    Assessment & Plan      Jennifer Stinson is a 75 year old female admitted on 7/28/2025. She presents with a headache and chest pain    Hypertension  Chest pain  Hx CAD  HLD  Headache  [Needs rec- amlodipine 10 mg daily,  mg daily, atorvastatin 40 mg daily, lisinopril 40 mg daily, metoprolol 50 mg daily]  *BP historically in clinic mild elevated to normal. Has hx abnormal stress test, CT coronary angio w/ mild-mod CAD 2018. 2018 echo w/ nl EF, early diastolic dysfunction.   *Complaining of chest pain on presentation but minimizes for me. States has pain all over but denies cp/abd pain at this time. Breathing ok. C/o headache  *in ED BP as high as 224/111, improved 180s systolic with meds. Other VSS. Troponin 8->8. . EKG non-ischemic changes.   *CT head w/o acute process  - telemetry   - prn labetalol, hydralazine SBP>180  - start hydrochlorothiazide 12.5 mg daily   - resume metoprolol, amlodipine, lisinopril at home doses  - stress test in am    L sided abdominal pain  Cirrhosis w/ ascites  GERD  [Needs rec- carafate, omeprazole 20 mg daily]  CT on presentation with cirrhosis, no ascites. No other acute findings.   - repeat LFTs (hemolyzed)    Thrombocytopenia  Plts 100. Baseline 185. Suspect 2/2 liver disease.   - monitor    Hypothyroidism  - resume levothyroxine with rec    Hx CVA  2011.         Diet:  cardiac  DVT Prophylaxis: Pneumatic Compression Devices  Lopez Catheter: Not present  Lines: None     Cardiac Monitoring: None  Code Status:  Full    Clinically Significant Risk Factors Present on Admission         # Hyponatremia: Lowest Na = 133 mmol/L in last 2 days, will monitor as appropriate   # Hypocalcemia: Lowest Ca = 8.5 mg/dL in last 2 days, will monitor and replace as appropriate     # Hypoalbuminemia: Lowest albumin = 3.2 g/dL at 7/28/2025  7:30 PM, will monitor as  "appropriate   # Drug Induced Platelet Defect: home medication list includes an antiplatelet medication   # Hypertension: Noted on problem list           # Obesity: Estimated body mass index is 31.78 kg/m  as calculated from the following:    Height as of an earlier encounter on 7/28/25: 1.676 m (5' 6\").    Weight as of an earlier encounter on 7/28/25: 89.3 kg (196 lb 14.4 oz).              Disposition Plan     Medically Ready for Discharge: Anticipated Tomorrow           Trey Ying MD  Hospitalist Service  [facility]  Securely message with [employer] (more info)  Text page via [facility] Paging/Directory     ______________________________________________________________________    Chief Complaint   Headache, chest pain    History is obtained from the patient, electronic health record, and emergency department physician    History of Present Illness   Jennifer Stinson is a 75 year old female who presents with chest pain, shortness of breath, headache, abdominal pain among others.  She reportedly had chest pain earlier today that she describes as a pressure.  A family member states that she has been having left-sided chest pain in the front laterally as well as to the back that they have been massaging.  She also had reported shortness of breath before.  At the time of my visit she denies or minimizes symptoms.  She states she does not really have any chest pain.  She also denies any abdominal pain although she complained of left-sided abdominal pain on presentation.  She denies any edema.  Her biggest complaint right now is that she has a fairly severe headache.  Her blood pressures quite high  over 200 systolic.  She was given some antihypertensive medication in the emergency department which helped her blood pressure and her headache but her blood pressure is high again.  On review of the chart her blood pressures typically in clinic are in the 130s systolic.      Past Medical History    Past " Medical History:   Diagnosis Date    GERD (gastroesophageal reflux disease)     Hyperlipidemia LDL goal < 130     Hypertension     Hypothyroidism     Stroke (H)        Past Surgical History   Past Surgical History:   Procedure Laterality Date    CHOLECYSTECTOMY         Prior to Admission Medications   Prior to Admission Medications   Prescriptions Last Dose Informant Patient Reported? Taking?   amLODIPine (NORVASC) 10 MG tablet   No No   Sig: TAKE 1 TABLET(10 MG) BY MOUTH DAILY   aspirin (ASA) 325 MG EC tablet   No No   Sig: Take 1 tablet (325 mg) by mouth daily   atorvastatin (LIPITOR) 40 MG tablet   No No   Sig: Take 1 tablet (40 mg) by mouth daily   levothyroxine (SYNTHROID/LEVOTHROID) 88 MCG tablet   No No   Sig: TAKE 1 TABLET(88 MCG) BY MOUTH DAILY   lisinopril (ZESTRIL) 40 MG tablet   No No   Sig: TAKE 1 TABLET(40 MG) BY MOUTH DAILY   metoprolol succinate ER (TOPROL XL) 50 MG 24 hr tablet   No No   Sig: TAKE 1 TABLET(50 MG) BY MOUTH DAILY   multiple vitamin  tablet TABS   No No   Sig: Take 1 tablet by mouth daily   Patient not taking: Reported on 7/28/2025   omeprazole (PRILOSEC) 20 MG DR capsule   No No   Sig: TAKE 1 CAPSULE(20 MG) BY MOUTH EVERY DAY 30 TO 60 MINUTES BEFORE A MEAL   sucralfate (CARAFATE) 1 GM/10ML suspension   Yes No   Sig: Take 1,000 mg by mouth.      Facility-Administered Medications: None        Review of Systems    The 10 point Review of Systems is negative other than noted in the HPI or here.     Social History   I have reviewed this patient's social history and updated it with pertinent information if needed.  Social History     Tobacco Use    Smoking status: Never    Smokeless tobacco: Never   Vaping Use    Vaping status: Never Used   Substance Use Topics    Alcohol use: No    Drug use: No        Physical Exam   Vital Signs: Temp: 98.3  F (36.8  C) Temp src: Oral BP: (!) 192/89 Pulse: 80   Resp: 18 SpO2: 97 %      Weight: 0 lbs 0 oz    General Appearance: Alert, very pleasant, no  distress  Respiratory: CTA B  Cardiovascular: RRR w/ SEAN noted. No edema  GI: soft, nt/nd  Skin: no rashes or lesions grossly    Other: CN grossly intact, WILKINS      Medical Decision Making       65 MINUTES SPENT BY ME on the date of service doing chart review, history, exam, documentation & further activities per the note.      Data   ------------------------- PAST 24 HR DATA REVIEWED -----------------------------------------------    I have personally reviewed the following data over the past 24 hrs:    6.1  \   14.0   / 100 (L)     133 (L) 99 10.2 /  99   4.5 26 0.61 \     ALT: N/A AST: N/A AP: N/A TBILI: 0.5   ALB: 3.2 (L) TOT PROTEIN: 6.8 LIPASE: 30     Trop: 8 BNP: 243       Imaging results reviewed over the past 24 hrs:   Recent Results (from the past 24 hours)   Abd/pelvis CT no contrast - Stone Protocol    Narrative    EXAM: CT ABDOMEN PELVIS W/O CONTRAST  LOCATION: [facility]  DATE: 7/28/2025    INDICATION: Left flank pain.  COMPARISON: 3/30/2014  TECHNIQUE: CT scan of the abdomen and pelvis was performed without IV contrast. Multiplanar reformats were obtained. Dose reduction techniques were used.  CONTRAST: None.    FINDINGS:   LOWER CHEST: No consolidation or pleural effusion.    HEPATOBILIARY: Diffuse surface nodularity of the liver, new from prior. Gallbladder is surgically absent.    PANCREAS: Normal.    SPLEEN: Normal.    ADRENAL GLANDS: Normal.    KIDNEYS/BLADDER: Normal. No urinary stones or hydroureteronephrosis.    BOWEL: Multiple colonic diverticula present, most highly concentrated in the sigmoid colon, without acute diverticulitis. No abnormal bowel thickening or bowel obstruction. Appendix is normal. No free fluid or free air.    LYMPH NODES: Normal.    VASCULATURE: Mild aortoiliac atherosclerotic calcifications. No abdominal aortic aneurysm.    PELVIC ORGANS: Normal.    MUSCULOSKELETAL: Mild spinal degenerative changes. No suspicious osseous lesions or acute  fractures.        Impression    IMPRESSION:     1.  Interval development of cirrhosis. No abdominal ascites.    2.  Colonic diverticulosis without acute diverticulitis.     Head CT w/o contrast    Narrative    EXAM: CT HEAD W/O CONTRAST  LOCATION: [facility]  DATE: 7/29/2025    INDICATION: headache  COMPARISON:  MRI brain September 7, 2000 and.  TECHNIQUE: Routine CT Head without IV contrast. Multiplanar reformats. Dose reduction techniques were used.    FINDINGS:  INTRACRANIAL CONTENTS: No intracranial hemorrhage, extraaxial collection, or mass effect.  No CT evidence of acute infarct. Left basal ganglia remote lacunar infarcts. Normal parenchymal attenuation. Normal ventricles and sulci. Flattening of the   pituitary gland against the floor of the expanded sella. This is most commonly incidental but can be seen in the setting of intracranial hypertension.    VISUALIZED ORBITS/SINUSES/MASTOIDS: No intraorbital abnormality. No paranasal sinus mucosal disease. No middle ear or mastoid effusion.    BONES/SOFT TISSUES: No acute abnormality.      Impression    IMPRESSION:  1.  No acute intracranial process.

## 2025-07-29 NOTE — PROGRESS NOTES
07/29/25 1542   Appointment Info   Signing Clinician's Name / Credentials (PT) Farzana Crespo, PT   Living Environment   People in Home child(oral), adult  (Unclear as to how many but sounds like 3 adult children. Spouse is in [place].)   Current Living Arrangements house   Home Accessibility stairs to enter home;stairs within home   Number of Stairs, Main Entrance 1   Stair Railings, Main Entrance none   Number of Stairs, Within Home, Primary   (7+7 (split level))   Stair Railings, Within Home, Primary railings safe and in good condition   Transportation Anticipated family or friend will provide  (Daughter drives.)   Living Environment Comments Reports daughter assists with bathing but otherwise pateint reports she is I.   Self-Care   Usual Activity Tolerance good   Current Activity Tolerance moderate   Equipment Currently Used at Home   (Has a cane and a walker but only uses occassionally when knees are hurting.)   Fall history within last six months no   General Information   Onset of Illness/Injury or Date of Surgery 07/28/25   Referring Physician Trey Ying MD   Patient/Family Therapy Goals Statement (PT) To go home. Only concern is her blood pressure.   Pertinent History of Current Problem (include personal factors and/or comorbidities that impact the POC) Jennifer Stinson is a 75 year old female admitted on 7/28/2025. She presents with a headache and chest pain   Existing Precautions/Restrictions no known precautions/restrictions   Cognition   Affect/Mental Status (Cognition) WNL   Orientation Status (Cognition) oriented x 4   Follows Commands (Cognition) WNL   Pain Assessment   Patient Currently in Pain No   Integumentary/Edema   Integumentary/Edema no deficits were identifed   Posture    Posture Not impaired   Range of Motion (ROM)   Range of Motion ROM is WNL   Strength (Manual Muscle Testing)   Strength (Manual Muscle Testing) strength is WNL   Bed Mobility   Comment, (Bed Mobility) Independent    Transfers   Comment, (Transfers) Independent   Gait/Stairs (Locomotion)   Comment, (Gait/Stairs) Independent without AD. Able to go up/down 3 steps with rail without assist.   Balance   Balance no deficits were identified   Sensory Examination   Sensory Perception WNL   Clinical Impression   Criteria for Skilled Therapeutic Intervention Evaluation only   PT Total Evaluation Time   PT Pierce, Low Complexity Minutes (10948) 12   PT Discharge Planning   PT Plan No further PT indicated.   PT Discharge Recommendation (DC Rec) home with assist   PT Rationale for DC Rec Patient appears to be at her baseline. She reports she only gets assist with bathing at baseline. Feel she is safe to return home with family with continued assist with bathing as prior to admission.   PT Brief overview of current status  Goals of therapy will be to address safe mobility and make recs for d/c to next level of care. Pt and RN will continue to follow all falls risk precautions as documented by RN staff while hospitalized  Safe to be up [place].   PT Total Distance Amb During Session (feet) 200   Physical Therapy Time and Intention   Total Session Time (sum of timed and untimed services) 12

## 2025-07-29 NOTE — PHARMACY-ADMISSION MEDICATION HISTORY
Pharmacist Admission Medication History    Admission medication history is complete. The information provided in this note is only as accurate as the sources available at the time of the update.    Information Source(s): Patient, Family member, and CareEverywhere/SureScripts via in-person, using phone      Pertinent Information: family at bedside knew most of patient's medications. Atorvastatin does not have recent fill history in the last 12 months - patient states no current home supply but did not indicate how long since she did have supply/how long since last dose.     Changes made to PTA medication list:  Added: None  Deleted:   Sucralfate suspension - no fill history in the last 12 months, family/patient state taking PPI only   Changed:   Aspirin 325 mg -> 81 mg (per patient)   Multivitamin not taking -> taking with unknown last dose (currently out of supply)     Allergies reviewed with patient and updates made in EHR: yes    Medication History Completed By: Susanna Mirza [facility] 7/29/2025 8:06 AM    PTA Med List   Medication Sig Note Last Dose/Taking    amLODIPine (NORVASC) 10 MG tablet TAKE 1 TABLET(10 MG) BY MOUTH DAILY  7/28/2025 Morning    aspirin 81 MG EC tablet Take 81 mg by mouth daily.  7/28/2025 Morning    atorvastatin (LIPITOR) 40 MG tablet Take 1 tablet (40 mg) by mouth daily 7/29/2025: Currently out of home supply Unknown Morning    levothyroxine (SYNTHROID/LEVOTHROID) 88 MCG tablet TAKE 1 TABLET(88 MCG) BY MOUTH DAILY  7/28/2025 Morning    lisinopril (ZESTRIL) 40 MG tablet TAKE 1 TABLET(40 MG) BY MOUTH DAILY  7/28/2025 Morning    metoprolol succinate ER (TOPROL XL) 50 MG 24 hr tablet TAKE 1 TABLET(50 MG) BY MOUTH DAILY  7/28/2025 Morning    multiple vitamin  tablet TABS Take 1 tablet by mouth daily 7/29/2025: Currently out of home supply Unknown Morning    omeprazole (PRILOSEC) 20 MG DR capsule TAKE 1 CAPSULE(20 MG) BY MOUTH EVERY DAY 30 TO 60 MINUTES BEFORE A MEAL  7/28/2025 Morning

## 2025-07-29 NOTE — PROGRESS NOTES
[facility]    Medicine Progress Note - Hospitalist Service    Date of Admission:  7/28/2025    Assessment & Plan   Jennifer Stinson is a 75 year old female admitted on 7/28/2025. She presents with a headache and chest pain     Hypertension  Chest pain  Hx CAD  HLD  Headache  PTA amlodipine 10 mg daily,  mg daily, atorvastatin 40 mg daily, lisinopril 40 mg daily, metoprolol 50 mg daily  *BP historically in clinic mild elevated to normal. Has hx abnormal stress test, CT coronary angio w/ mild-mod CAD 2018. 2018 echo w/ nl EF, early diastolic dysfunction.   *Complaining of chest pain on presentation but minimizes for me. States has pain all over but denies cp/abd pain at this time. Breathing ok. C/o headache  *in ED BP as high as 224/111, improved 180s systolic with meds. Other VSS. Troponin 8->8. . EKG non-ischemic changes.   *CT head w/o acute process    - chest pain free today  - await results of stress test  - will get BP under more optimal control prior to discharge. Recommend 1 more night of monitoring even if normal stress test given her abnormal BP readings and need for dose of PRN clonidine over the night  - started new hydrochlorothiazide here and resumed home meds of metoprolol, amlodipine, lisinopril. Can switch metoprolol to coreg as next step if she remains elevated     L sided abdominal pain  Cirrhosis w/ ascites  GERD  PTA omeprazole 20 mg daily  CT on presentation with cirrhosis, no ascites. No other acute findings.   - LFTs only mildly abnormal  - will need outpatient follow up and management of new cirrhosis findings     Thrombocytopenia  Plts 100. Baseline 185. Suspect 2/2 liver disease.   - monitor     Hypothyroidism  - resume levothyroxine     Hx CVA  2011.        Observation Goals: -diagnostic tests and consults completed and resulted, -vital signs normal or at patient baseline, -adequate pain control on oral analgesics, Nurse to notify provider when  "observation goals have been met and patient is ready for discharge.  Diet: NPO for Medical/Clinical Reasons Except for: Meds    DVT Prophylaxis: Pneumatic Compression Devices  Lopez Catheter: Not present  Lines: None     Cardiac Monitoring: ACTIVE order. Indication: Chest pain/ ACS rule out (24 hours)  Code Status: Full Code      Clinically Significant Risk Factors Present on Admission         # Hyponatremia: Lowest Na = 133 mmol/L in last 2 days, will monitor as appropriate       # Hypoalbuminemia: Lowest albumin = 3.1 g/dL at 7/29/2025  6:24 AM, will monitor as appropriate   # Drug Induced Platelet Defect: home medication list includes an antiplatelet medication   # Hypertension: Noted on problem list           # Obesity: Estimated body mass index is 31.24 kg/m  as calculated from the following:    Height as of an earlier encounter on 7/28/25: 1.676 m (5' 6\").    Weight as of this encounter: 87.8 kg (193 lb 9 oz).              Social Drivers of Health    Food Insecurity: Unknown (7/28/2025)    Food Insecurity     Within the past 12 months, did you worry that your food would run out before you got money to buy more?: Patient declined     Within the past 12 months, did the food you bought just not last and you didn t have money to get more?: Patient declined   Depression: Not at risk (7/28/2025)    PHQ-2     PHQ-2 Score: Incomplete   Recent Concern: Depression - At risk (6/12/2025)    PHQ-2     PHQ-2 Score: 3   Housing Stability: Unknown (7/28/2025)    Housing Stability     Do you have housing? : Patient declined     Are you worried about losing your housing?: Patient declined   Financial Resource Strain: Unknown (7/28/2025)    Financial Resource Strain     Within the past 12 months, have you or your family members you live with been unable to get utilities (heat, electricity) when it was really needed?: Patient declined   Transportation Needs: Unknown (7/28/2025)    Transportation Needs     Within the past 12 " months, has lack of transportation kept you from medical appointments, getting your medicines, non-medical meetings or appointments, work, or from getting things that you need?: Patient declined   Physical Activity: Unknown (7/28/2025)    Exercise Vital Sign     Days of Exercise per Week: Patient declined   Stress: Stress Concern Present (7/28/2025)    [employer] - Occupational Stress Questionnaire     Feeling of Stress : To some extent   Social Connections: Unknown (7/28/2025)    Social Connection and Isolation Panel [NHANES]     Frequency of Social Gatherings with Friends and Family: More than three times a week          Disposition Plan     Medically Ready for Discharge: Anticipated Tomorrow             Antonieta Adams DO  Hospitalist Service  [facility]  Securely message with [employer] (more info)  Text page via [employer] Paging/Directory   ______________________________________________________________________    Interval History   Assumed care today. Visited with patient with velasquezugther bedside and she was used as interpretor initially. Patient able to understand next steps and plan. Denies any pain. No pain on exam. She didn't want to walk and we talked about stress test being different than her last one    Physical Exam   Vital Signs: Temp: 98.3  F (36.8  C) Temp src: Axillary BP: (!) 154/78 Pulse: 90   Resp: 18 SpO2: (P) 98 % O2 Device: None (Room air)    Weight: 193 lbs 9.02 oz    Constitutional: Awake, alert, cooperative, no apparent distress  Respiratory: Clear to auscultation bilaterally, no crackles or wheezing  Cardiovascular: Regular rate and rhythm, normal S1 and S2, and no murmur noted, no pain on palpation to sterum  GI: Normal bowel sounds, soft, non-distended, non-tender  Skin/Integumen: No rashes, no cyanosis, no edema  Other:     Medical Decision Making       55 MINUTES SPENT BY ME on the date of service doing chart review, history, exam,  documentation & further activities per the note.      Data     I have personally reviewed the following data over the past 24 hrs:    6.1  \   14.0   / 100 (L)     133 (L) 99 10.2 /  99   4.5 26 0.61 \     ALT: 28 AST: 46 (H) AP: 204 (H) TBILI: 0.5   ALB: 3.1 (L) TOT PROTEIN: 6.2 (L) LIPASE: 30     Trop: 8 BNP: 243     Procal: N/A CRP: N/A Lactic Acid: 1.7         Imaging results reviewed over the past 24 hrs:   Recent Results (from the past 24 hours)   Abd/pelvis CT no contrast - Stone Protocol    Narrative    EXAM: CT ABDOMEN PELVIS W/O CONTRAST  LOCATION: [facility]  DATE: 7/28/2025    INDICATION: Left flank pain.  COMPARISON: 3/30/2014  TECHNIQUE: CT scan of the abdomen and pelvis was performed without IV contrast. Multiplanar reformats were obtained. Dose reduction techniques were used.  CONTRAST: None.    FINDINGS:   LOWER CHEST: No consolidation or pleural effusion.    HEPATOBILIARY: Diffuse surface nodularity of the liver, new from prior. Gallbladder is surgically absent.    PANCREAS: Normal.    SPLEEN: Normal.    ADRENAL GLANDS: Normal.    KIDNEYS/BLADDER: Normal. No urinary stones or hydroureteronephrosis.    BOWEL: Multiple colonic diverticula present, most highly concentrated in the sigmoid colon, without acute diverticulitis. No abnormal bowel thickening or bowel obstruction. Appendix is normal. No free fluid or free air.    LYMPH NODES: Normal.    VASCULATURE: Mild aortoiliac atherosclerotic calcifications. No abdominal aortic aneurysm.    PELVIC ORGANS: Normal.    MUSCULOSKELETAL: Mild spinal degenerative changes. No suspicious osseous lesions or acute fractures.        Impression    IMPRESSION:     1.  Interval development of cirrhosis. No abdominal ascites.    2.  Colonic diverticulosis without acute diverticulitis.     Head CT w/o contrast    Narrative    EXAM: CT HEAD W/O CONTRAST  LOCATION: [facility]  DATE: 7/29/2025    INDICATION:  headache  COMPARISON:  MRI brain September 7, 2000 and.  TECHNIQUE: Routine CT Head without IV contrast. Multiplanar reformats. Dose reduction techniques were used.    FINDINGS:  INTRACRANIAL CONTENTS: No intracranial hemorrhage, extraaxial collection, or mass effect.  No CT evidence of acute infarct. Left basal ganglia remote lacunar infarcts. Normal parenchymal attenuation. Normal ventricles and sulci. Flattening of the   pituitary gland against the floor of the expanded sella. This is most commonly incidental but can be seen in the setting of intracranial hypertension.    VISUALIZED ORBITS/SINUSES/MASTOIDS: No intraorbital abnormality. No paranasal sinus mucosal disease. No middle ear or mastoid effusion.    BONES/SOFT TISSUES: No acute abnormality.      Impression    IMPRESSION:  1.  No acute intracranial process.     NM Lexiscan stress test (nuc card)   Result Value    Target     Baseline Systolic     Baseline Diastolic BP 70    Last Stress Systolic     Last Stress Diastolic BP 64    Baseline HR 87    Max HR  106    Max Predicted HR  72    Rate Pressure Product 13,992.0

## 2025-07-29 NOTE — ED PROVIDER NOTES
Emergency Department Note      History of Present Illness     Chief Complaint   Chest Pain      HPI   Jennifer Stinson is a 75 year old female with a history of stroke, hypertension, and chirrhosis presenting with chest pain. The patient reports chest pain, that she describes as a pressure, this morning. She also endorses shortness of breath with movement, coughing, edema, and headache. Denies fever or vomiting. Had 1 nitroglycerin and 324 Aspirin, which did help her pain. She did not take her amlodipine this morning. Reports taking her other medications.  No pain with urination.    Independent Historian   None    Review of External Notes   Reviewed patient's office visit from earlier today, patient was seen for chest pain, left upper quadrant abdominal pain.    Past Medical History     Medical History and Problem List   Hypothyroidism   Hypertension   Hypercholesteremia   Latent tuberculosis   GERD  Stroke   Cirrhosis of liver with ascites   Rheumatoid arthritis   CVA     Medications   Atorvastatin   Levothyroxine   Lisinopril   Amlodipine   Sucralfate   Metoprolol   Aspirin   Omeprazole     Surgical History   Cholecystectomy     Physical Exam     Patient Vitals for the past 24 hrs:   BP Temp Temp src Pulse Resp SpO2 Weight   07/29/25 1355 138/73 -- -- 78 -- -- --   07/29/25 1103 (!) 151/88 98.8  F (37.1  C) Axillary -- -- 98 % --   07/29/25 1016 -- -- -- -- -- (P) 98 % --   07/29/25 1014 -- -- -- -- -- 99 % --   07/29/25 1012 -- -- -- -- -- 100 % --   07/29/25 1005 -- -- -- -- -- 97 % --   07/29/25 0830 (!) 154/78 98.3  F (36.8  C) Axillary 90 18 97 % --   07/29/25 0740 (!) 169/90 99.8  F (37.7  C) Oral 96 18 96 % --   07/29/25 0320 (!) 157/71 -- -- -- -- -- --   07/29/25 0210 (!) 191/99 -- -- -- -- -- --   07/29/25 0154 (!) 207/104 -- -- -- -- -- --   07/29/25 0132 (!) 210/95 98.7  F (37.1  C) Oral 91 -- 96 % --   07/29/25 0128 -- -- -- -- -- -- 87.8 kg (193 lb 9 oz)   07/29/25 0111 (!) 205/94 -- -- 84 -- 94 % --    07/29/25 0031 -- -- -- 83 12 -- --   07/29/25 0030 (!) 207/103 -- -- 89 -- -- --   07/29/25 0022 -- -- -- 96 19 94 % --   07/29/25 0001 -- -- -- 78 11 94 % --   07/29/25 0000 (!) 206/100 -- -- 76 -- -- --   07/28/25 2330 (!) 192/89 -- -- 80 18 97 % --   07/28/25 2300 (!) 192/103 -- -- 73 11 98 % --   07/28/25 2100 (!) 180/86 -- -- 81 22 96 % --   07/28/25 2015 (!) 195/88 -- -- 84 14 95 % --   07/28/25 1931 (!) 206/93 98.3  F (36.8  C) Oral 74 15 96 % --   07/28/25 1900 -- -- -- 85 26 93 % --   07/28/25 1845 (!) 224/111 -- -- 75 -- 95 % --     Physical Exam  General: Well-nourished, resting comfortably when I enter the room  Eyes: Pupils equal, conjunctivae pink no scleral icterus or conjunctival injection  ENT:  Moist mucus membranes  Respiratory:  Lungs clear to auscultation bilaterally, no crackles/rubs/wheezes.  Good air movement  CV: Normal rate and rhythm, no murmurs  GI:  Abdomen soft and non-distended.  No tenderness, guarding or rebound  Skin: Warm, dry.  No rashes or petechiae  Musculoskeletal: No peripheral edema or calf tenderness.  No midline tenderness of her back.  Tenderness to palpation in the paravertebral musculatures of her mid thoracic back.  Neuro: Alert and oriented to person/place/time  Psychiatric: Normal affect      Diagnostics     Lab Results   Labs Ordered and Resulted from Time of ED Arrival to Time of ED Departure   COMPREHENSIVE METABOLIC PANEL (LIMITED OCCURRENCES) - Abnormal       Result Value    Sodium 133 (*)     Potassium 4.5      Carbon Dioxide (CO2) 26      Anion Gap 8      Urea Nitrogen 10.2      Creatinine 0.61      GFR Estimate >90      Calcium 8.5 (*)     Chloride 99      Glucose 99      Alkaline Phosphatase        AST        ALT        Protein Total 6.8      Albumin 3.2 (*)     Bilirubin Total 0.5     CBC WITH PLATELETS AND DIFFERENTIAL - Abnormal    WBC Count 6.1      RBC Count 4.56      Hemoglobin 14.0      Hematocrit 39.8      MCV 87      MCH 30.7      MCHC 35.2       RDW 14.2      Platelet Count 100 (*)     % Neutrophils 59      % Lymphocytes 24      % Monocytes 14      % Eosinophils 2      % Basophils 1      % Immature Granulocytes 1      NRBCs per 100 WBC 0      Absolute Neutrophils 3.6      Absolute Lymphocytes 1.4      Absolute Monocytes 0.8      Absolute Eosinophils 0.1      Absolute Basophils 0.0      Absolute Immature Granulocytes 0.1      Absolute NRBCs 0.0     TROPONIN T, HIGH SENSITIVITY - Normal    Troponin T, High Sensitivity 8     LIPASE - Normal    Lipase 30     NT-PROBNP - Normal    NT-proBNP 243     TROPONIN T, HIGH SENSITIVITY - Normal    Troponin T, High Sensitivity 8     UA MACROSCOPIC WITH REFLEX TO MICRO AND CULTURE - Normal    Color Urine Straw      Appearance Urine Clear      Glucose Urine Negative      Bilirubin Urine Negative      Ketones Urine Negative      Specific Gravity Urine 1.004      Blood Urine Negative      pH Urine 6.5      Protein Albumin Urine Negative      Urobilinogen Urine Normal      Nitrite Urine Negative      Leukocyte Esterase Urine Negative     RBC AND PLATELET MORPHOLOGY    RBC Morphology Confirmed RBC Indices      Platelet Assessment        Value: Automated Count Confirmed. Platelet morphology is normal.       Imaging   NM Lexiscan stress test (nuc card)         Head CT w/o contrast   Final Result   IMPRESSION:   1.  No acute intracranial process.         Abd/pelvis CT no contrast - Stone Protocol   Final Result   IMPRESSION:       1.  Interval development of cirrhosis. No abdominal ascites.      2.  Colonic diverticulosis without acute diverticulitis.             EKG   ECG results from 07/28/25   EKG 12-lead, tracing only     Value    Systolic Blood Pressure     Diastolic Blood Pressure     Ventricular Rate 78    Atrial Rate 78    WV Interval 156    QRS Duration 74        QTc 437    P Axis 50    R AXIS 10    T Axis 66    Interpretation ECG      Sinus rhythm  Normal ECG  When compared with ECG of 11-Sep-2022 03:44,  No  significant change was found  ECG reviewed by me at 1935       Independent Interpretation   None    ED Course      Medications Administered   Medications   labetalol (NORMODYNE/TRANDATE) injection 10 mg (10 mg Intravenous $Given 7/29/25 0109)   hydrALAZINE (APRESOLINE) injection 10 mg (10 mg Intravenous $Given 7/29/25 0138)   amLODIPine (NORVASC) tablet 10 mg (10 mg Oral $Given 7/29/25 0833)   atorvastatin (LIPITOR) tablet 40 mg (40 mg Oral $Given 7/29/25 0834)   lisinopril (ZESTRIL) tablet 40 mg (40 mg Oral $Given 7/29/25 0833)   metoprolol succinate ER (TOPROL XL) 24 hr tablet 50 mg (50 mg Oral $Given 7/29/25 1106)   pantoprazole (PROTONIX) EC tablet 40 mg (40 mg Oral $Given 7/29/25 0834)   senna-docusate (SENOKOT-S/PERICOLACE) 8.6-50 MG per tablet 1 tablet (has no administration in time range)     Or   senna-docusate (SENOKOT-S/PERICOLACE) 8.6-50 MG per tablet 2 tablet (has no administration in time range)   ondansetron (ZOFRAN ODT) ODT tab 4 mg (has no administration in time range)     Or   ondansetron (ZOFRAN) injection 4 mg (has no administration in time range)   prochlorperazine (COMPAZINE) injection 5 mg ( Intravenous See Alternative 7/29/25 0844)     Or   prochlorperazine (COMPAZINE) tablet 5 mg (5 mg Oral $Given 7/29/25 0844)   acetaminophen (TYLENOL) tablet 650 mg (650 mg Oral $Given 7/29/25 0843)     Or   acetaminophen (TYLENOL) Suppository 650 mg ( Rectal See Alternative 7/29/25 0843)   hydrochlorothiazide (MICROZIDE) capsule 12.5 mg (12.5 mg Oral $Given 7/29/25 0834)   HOLD: Caffeine containing medications 12 hours prior to the procedure (has no administration in time range)   HOLD: dipyridamole (PERSANTINE) or aspirin/dipyridamole (AGGRENOX) 48 hours prior to the procedure (has no administration in time range)   HOLD: theophylline or aminophylline 12 hours prior to the procedure (has no administration in time range)   IF patient diabetic - HOLD: ALL ORAL HYPOGLYCEMICS and include: glipizide,  glyburide, glimepiride, gliclazide, metformin, any metformin containing medication, on day of the procedure (has no administration in time range)   cloNIDine (CATAPRES) tablet 0.1 mg (0.1 mg Oral $Given 7/29/25 0217)   levothyroxine (SYNTHROID/LEVOTHROID) tablet 88 mcg (88 mcg Oral $Given 7/29/25 0834)   lidocaine 1 % 1 mL (has no administration in time range)   lidocaine (LMX4) cream (has no administration in time range)   sodium chloride (PF) 0.9% PF flush 3 mL (has no administration in time range)   sodium chloride (PF) 0.9% PF flush 3 mL ( Intravenous Canceled Entry 7/29/25 1306)   sodium chloride 0.9 % infusion (has no administration in time range)   nitroGLYcerin (NITROSTAT) sublingual tablet 0.4 mg (has no administration in time range)   sodium chloride 0.9% BOLUS 250 mL (has no administration in time range)   aspirin EC tablet 81 mg (has no administration in time range)   acetaminophen (TYLENOL) tablet 975 mg (975 mg Oral $Given 7/28/25 1947)   amLODIPine (NORVASC) tablet 10 mg (10 mg Oral $Given 7/28/25 1947)   technetium sestamibi 2 UD per study (Tc99m MiBi) radioisotope injection 3-42 millicurie (30.4 millicuries Intravenous $Given 7/29/25 1015)   regadenoson (LEXISCAN) injection 0.4 mg (0.4 mg Intravenous $Given 7/29/25 1006)       Procedures   Procedures     Discussion of Management   None    ED Course   ED Course as of 07/29/25 1406   Mon Jul 28, 2025 1943 I obtained the history and examined the patient as noted above.          Additional Documentation  None    Medical Decision Making / Diagnosis     CMS Diagnoses: None    MIPS   None     MDM   Jennifer Stinson is a 75 year old female with a history of hypertension, liver cirrhosis presents to the emergency department with a complaint of chest pain, headache, elevated blood pressure.  On exam patient is well-appearing.  She is not in any acute distress.  Vital signs show that she is hypertensive.  She is not febrile or tachycardic.  Abdomen is soft and  nontender.  She does have some tenderness on the right mid back in the paravertebral musculature.  No tenderness of the midline spine.  She is not having any numbness or weakness of her lower extremities.  No loss of bowel or bladder control.  No pain with urination.  She has noticed some slight swelling in her lower extremities, which she has nonpitting edema in her bilateral lower extremities.  It is noted that the patient has not taken her amlodipine today.  She is given her home dose.  Laboratory work is reassuring.  No signs of endorgan damage.  Low suspicion for hypertensive emergency.  No signs of infection. Electrolytes are all within acceptable limits. Platelets are a little low, which I expect is from her cirrhosis.  No KINGSLEY.  No signs of ACS. On reevaluation, patient's blood pressure has improved.  Headache is gone.  Patient is feeling much better.  The patient now complains of left-sided abdominal pain.  Abdomen is soft.  She has mild diffuse tenderness to palpation of her abdomen.  Family would like a CT scan of her abdomen.  CT scan shows liver cirrhosis.  No signs of infection, bowel obstruction, kidney stone. No significant ascites, low suspicion for SBP.  On reevaluation, the patient's blood pressure has increased again, and she is complaining of a headache. Patient's family reports when the patient had her last stroke her blood pressure was high and she was complaining of a headache. CT head does not show any acute findings.  I spoke with Dr. Ying for admission for hypertensive urgency.    Disposition   The patient was admitted.    Diagnosis     ICD-10-CM    1. Headache, unspecified headache type  R51.9       2. Left sided abdominal pain  R10.9       3. Hypertensive urgency  I16.0            Discharge Medications   Current Discharge Medication List          Scribe Disclosure:  I, Bibi Almaguer, am serving as a scribe at 7:44 PM on 7/28/2025 to document services personally performed by  Romina Kurtz MD based on my observations and the provider's statements to me.      Romina Kurtz MD  07/29/25 [id number]

## 2025-07-29 NOTE — PROGRESS NOTES
Summary:    Care Plan Summary Note:  Orientation: A&O x4  Observation Goals (met & not met): not met  Activity Level: SBA  Fall Risk: yes  Behavior & Aggression Tool Color: green  Pain Management: Tylenol and ice packs for headache  ABNL VS/O2: pt hypertensive on arrival from ED, SBP goal < 180. Received PRN Labetalol, Hydralazine and Clonidine to get BP down  ABNL Lab/BG: NA  Diet: cardiac diet  Bowel/Bladder: continent  Drains/Devices: NA  Tests/Procedures for next shift: lexiscan  Anticipated DC date: today  Other Important Info: family at bedside can assist with translating

## 2025-07-29 NOTE — PROGRESS NOTES
Summary:    Care Plan Summary Note:  Orientation: A&O x4  Observation Goals (met & not met): not met  Activity Level: SBA  Fall Risk: yes  Behavior & Aggression Tool Color: green  Pain Management: Tylenol x2 and ice packs for headache  ABNL VS/O2: HTN , SBP goal < 180 Labetalol given x1   ABNL Lab/BG: NA  Diet: cardiac diet  Bowel/Bladder: continent  Drains/Devices: NA  Tests/Procedures for next shift:   Anticipated DC date: possibly tomorrow.   Other Important Info: family at bedside can assist with translating

## 2025-07-30 ENCOUNTER — PATIENT OUTREACH (OUTPATIENT)
Dept: GERIATRIC MEDICINE | Facility: CLINIC | Age: 75
End: 2025-07-30

## 2025-07-30 LAB
ALBUMIN SERPL BCG-MCNC: 2.8 G/DL (ref 3.5–5.2)
ALP SERPL-CCNC: 202 U/L (ref 40–150)
ALT SERPL W P-5'-P-CCNC: 42 U/L (ref 0–50)
ANION GAP SERPL CALCULATED.3IONS-SCNC: 9 MMOL/L (ref 7–15)
AST SERPL W P-5'-P-CCNC: 91 U/L (ref 0–45)
ATRIAL RATE - MUSE: 71 BPM
BILIRUB DIRECT SERPL-MCNC: 0.2 MG/DL (ref 0–0.3)
BILIRUB SERPL-MCNC: 0.4 MG/DL
BUN SERPL-MCNC: 10 MG/DL (ref 8–23)
CALCIUM SERPL-MCNC: 8 MG/DL (ref 8.8–10.4)
CHLORIDE SERPL-SCNC: 100 MMOL/L (ref 98–107)
CREAT SERPL-MCNC: 0.54 MG/DL (ref 0.51–0.95)
DIASTOLIC BLOOD PRESSURE - MUSE: NORMAL MMHG
EGFRCR SERPLBLD CKD-EPI 2021: >90 ML/MIN/1.73M2
ERYTHROCYTE [DISTWIDTH] IN BLOOD BY AUTOMATED COUNT: 14.6 % (ref 10–15)
GLUCOSE SERPL-MCNC: 140 MG/DL (ref 70–99)
HCO3 SERPL-SCNC: 26 MMOL/L (ref 22–29)
HCT VFR BLD AUTO: 38 % (ref 35–47)
HGB BLD-MCNC: 13.2 G/DL (ref 11.7–15.7)
INTERPRETATION ECG - MUSE: NORMAL
MCH RBC QN AUTO: 30.6 PG (ref 26.5–33)
MCHC RBC AUTO-ENTMCNC: 34.7 G/DL (ref 31.5–36.5)
MCV RBC AUTO: 88 FL (ref 78–100)
P AXIS - MUSE: 66 DEGREES
PLATELET # BLD AUTO: 105 10E3/UL (ref 150–450)
POTASSIUM SERPL-SCNC: 3.3 MMOL/L (ref 3.4–5.3)
PR INTERVAL - MUSE: 158 MS
PROT SERPL-MCNC: 6.1 G/DL (ref 6.4–8.3)
QRS DURATION - MUSE: 78 MS
QT - MUSE: 414 MS
QTC - MUSE: 449 MS
R AXIS - MUSE: 42 DEGREES
RBC # BLD AUTO: 4.31 10E6/UL (ref 3.8–5.2)
SODIUM SERPL-SCNC: 135 MMOL/L (ref 135–145)
SYSTOLIC BLOOD PRESSURE - MUSE: NORMAL MMHG
T AXIS - MUSE: 2 DEGREES
T4 FREE SERPL-MCNC: 1.18 NG/DL (ref 0.9–1.7)
TSH SERPL DL<=0.005 MIU/L-ACNC: 5.63 UIU/ML (ref 0.3–4.2)
VENTRICULAR RATE- MUSE: 71 BPM
WBC # BLD AUTO: 3.8 10E3/UL (ref 4–11)

## 2025-07-30 PROCEDURE — 85027 COMPLETE CBC AUTOMATED: CPT | Performed by: HOSPITALIST

## 2025-07-30 PROCEDURE — 99233 SBSQ HOSP IP/OBS HIGH 50: CPT | Performed by: HOSPITALIST

## 2025-07-30 PROCEDURE — 84132 ASSAY OF SERUM POTASSIUM: CPT | Performed by: HOSPITALIST

## 2025-07-30 PROCEDURE — 84443 ASSAY THYROID STIM HORMONE: CPT | Performed by: HOSPITALIST

## 2025-07-30 PROCEDURE — 250N000013 HC RX MED GY IP 250 OP 250 PS 637: Performed by: INTERNAL MEDICINE

## 2025-07-30 PROCEDURE — 250N000013 HC RX MED GY IP 250 OP 250 PS 637: Performed by: HOSPITALIST

## 2025-07-30 PROCEDURE — 82040 ASSAY OF SERUM ALBUMIN: CPT | Performed by: HOSPITALIST

## 2025-07-30 PROCEDURE — 80048 BASIC METABOLIC PNL TOTAL CA: CPT | Performed by: HOSPITALIST

## 2025-07-30 PROCEDURE — 93005 ELECTROCARDIOGRAM TRACING: CPT

## 2025-07-30 PROCEDURE — 120N000001 HC R&B MED SURG/OB

## 2025-07-30 PROCEDURE — 36415 COLL VENOUS BLD VENIPUNCTURE: CPT | Performed by: HOSPITALIST

## 2025-07-30 PROCEDURE — 250N000013 HC RX MED GY IP 250 OP 250 PS 637: Performed by: STUDENT IN AN ORGANIZED HEALTH CARE EDUCATION/TRAINING PROGRAM

## 2025-07-30 PROCEDURE — 84439 ASSAY OF FREE THYROXINE: CPT | Performed by: HOSPITALIST

## 2025-07-30 PROCEDURE — 82310 ASSAY OF CALCIUM: CPT | Performed by: HOSPITALIST

## 2025-07-30 RX ORDER — CARVEDILOL 6.25 MG/1
6.25 TABLET ORAL 2 TIMES DAILY WITH MEALS
Status: DISCONTINUED | OUTPATIENT
Start: 2025-07-30 | End: 2025-07-31

## 2025-07-30 RX ORDER — POTASSIUM CHLORIDE 1500 MG/1
40 TABLET, EXTENDED RELEASE ORAL ONCE
Status: COMPLETED | OUTPATIENT
Start: 2025-07-30 | End: 2025-07-30

## 2025-07-30 RX ADMIN — ASPIRIN 81 MG: 81 TABLET, DELAYED RELEASE ORAL at 08:45

## 2025-07-30 RX ADMIN — HYDROCHLOROTHIAZIDE 12.5 MG: 12.5 CAPSULE ORAL at 08:45

## 2025-07-30 RX ADMIN — CARVEDILOL 6.25 MG: 6.25 TABLET, FILM COATED ORAL at 17:08

## 2025-07-30 RX ADMIN — LEVOTHYROXINE SODIUM 88 MCG: 0.09 TABLET ORAL at 08:45

## 2025-07-30 RX ADMIN — AMLODIPINE BESYLATE 10 MG: 10 TABLET ORAL at 08:45

## 2025-07-30 RX ADMIN — PANTOPRAZOLE SODIUM 40 MG: 40 TABLET, DELAYED RELEASE ORAL at 08:45

## 2025-07-30 RX ADMIN — CARVEDILOL 6.25 MG: 6.25 TABLET, FILM COATED ORAL at 08:45

## 2025-07-30 RX ADMIN — LISINOPRIL 40 MG: 40 TABLET ORAL at 08:45

## 2025-07-30 RX ADMIN — POTASSIUM CHLORIDE 40 MEQ: 1500 TABLET, EXTENDED RELEASE ORAL at 17:08

## 2025-07-30 RX ADMIN — ACETAMINOPHEN 650 MG: 325 TABLET, FILM COATED ORAL at 14:53

## 2025-07-30 RX ADMIN — ATORVASTATIN CALCIUM 40 MG: 40 TABLET, FILM COATED ORAL at 08:45

## 2025-07-30 ASSESSMENT — ACTIVITIES OF DAILY LIVING (ADL)
ADLS_ACUITY_SCORE: 42
ADLS_ACUITY_SCORE: 41
ADLS_ACUITY_SCORE: 42
ADLS_ACUITY_SCORE: 41
ADLS_ACUITY_SCORE: 42
ADLS_ACUITY_SCORE: 41
ADLS_ACUITY_SCORE: 42

## 2025-07-30 NOTE — SIGNIFICANT EVENT
Significant Event Note -Remote Cross Cover Note    Time of event: 8:53 PM July 29, 2025    Description of event:  Patient reporting a dry cough, no recent chest imaging in the chart.     Plan:  Cxr   Leo Jimenez MD  Remote cross cover is available from 6pm to 12am. For concerns after 12am please contact the appropriate nocturnist.

## 2025-07-30 NOTE — PROGRESS NOTES
[facility]    Medicine Progress Note - Hospitalist Service    Date of Admission:  7/28/2025    Assessment & Plan   Jennifer Stinson is a 75 year old female admitted on 7/28/2025. She presents with a headache and chest pain     Hypertension  Chest pain  Hx CAD  HLD  Headache  PTA amlodipine 10 mg daily,  mg daily, atorvastatin 40 mg daily, lisinopril 40 mg daily, metoprolol 50 mg daily  *BP historically in clinic mild elevated to normal. Has hx abnormal stress test, CT coronary angio w/ mild-mod CAD 2018. 2018 echo w/ nl EF, early diastolic dysfunction.   *Complaining of chest pain on presentation but minimizes for me. States has pain all over but denies cp/abd pain at this time. Breathing ok. C/o headache  *in ED BP as high as 224/111, improved 180s systolic with meds. Other VSS. Troponin 8->8. . EKG non-ischemic changes.   *CT head w/o acute process  * stress test negative  * blood pressure better controlled with change from metoprolol to coreg and addiing hydrochlorothiazide  plan  - repeat labs including TSH, CBC and BMP today  - continue lisinopril, amlodipine, hydrochlorothiazide, and change metoprolol to coreg  - needs evaluation for untreated sleep apnea causing resistant hypertension  - discharge to home when   - large pain complaints that including multiple locations: head, abdomen, shoulders, and legs. Note she scored poorly on PHQ 9 at home. Consider selective serotonin reuptake inhibitor once blood pressures are more stabilized, defer to PCP     L sided abdominal pain  Cirrhosis w/ ascites  GERD  PTA omeprazole 20 mg daily  CT on presentation with cirrhosis, no ascites. No other acute findings.   - LFTs only mildly abnormal  - see mngi  Thrombocytopenia  Plts 100. Baseline 185. Suspect 2/2 liver disease.   - monitor     Hypothyroidism  - resume levothyroxine     Hx CVA  2011.           Diet: Regular Diet Adult    DVT Prophylaxis: Pneumatic Compression Devices  Lopez  "Catheter: Not present  Lines: None     Cardiac Monitoring: None  Code Status: Full Code      Clinically Significant Risk Factors Present on Admission        # Hypokalemia: Lowest K = 3.3 mmol/L in last 2 days, will replace as needed  # Hyponatremia: Lowest Na = 133 mmol/L in last 2 days, will monitor as appropriate       # Hypoalbuminemia: Lowest albumin = 2.8 g/dL at 7/30/2025 11:00 AM, will monitor as appropriate   # Drug Induced Platelet Defect: home medication list includes an antiplatelet medication   # Hypertension: Noted on problem list           # Obesity: Estimated body mass index is 31.24 kg/m  as calculated from the following:    Height as of an earlier encounter on 7/28/25: 1.676 m (5' 6\").    Weight as of this encounter: 87.8 kg (193 lb 9 oz).              Social Drivers of Health    Food Insecurity: Unknown (7/28/2025)    Food Insecurity     Within the past 12 months, did you worry that your food would run out before you got money to buy more?: Patient declined     Within the past 12 months, did the food you bought just not last and you didn t have money to get more?: Patient declined   Depression: Not at risk (7/28/2025)    PHQ-2     PHQ-2 Score: Incomplete   Recent Concern: Depression - At risk (6/12/2025)    PHQ-2     PHQ-2 Score: 3   Housing Stability: Unknown (7/28/2025)    Housing Stability     Do you have housing? : Patient declined     Are you worried about losing your housing?: Patient declined   Financial Resource Strain: Unknown (7/28/2025)    Financial Resource Strain     Within the past 12 months, have you or your family members you live with been unable to get utilities (heat, electricity) when it was really needed?: Patient declined   Transportation Needs: Unknown (7/28/2025)    Transportation Needs     Within the past 12 months, has lack of transportation kept you from medical appointments, getting your medicines, non-medical meetings or appointments, work, or from getting things that " you need?: Patient declined   Physical Activity: Unknown (7/28/2025)    Exercise Vital Sign     Days of Exercise per Week: Patient declined   Stress: Stress Concern Present (7/28/2025)    [employer] - Occupational Stress Questionnaire     Feeling of Stress : To some extent   Social Connections: Unknown (7/28/2025)    Social Connection and Isolation Panel [NHANES]     Frequency of Social Gatherings with Friends and Family: More than three times a week          Disposition Plan     Medically Ready for Discharge: Anticipated Today versus tomorrow pending blood pressure trajectory             Trey Toussaint, DO  Hospitalist Service  [facility]  Securely message with qunb (more info)  Text page via [id number] Paging/Directory   ______________________________________________________________________    Interval History   Assumed care today. Had shoulder pain and had SBP up to 200. Changed metoprolol to coreg and she was donw to 130s without symptoms.     Discussed with daughter extensively on the phone. No alcohol, no nsaids. VERONICA concerns. Scored high on the PHQ. No major findings on multiple organ system workup currently    Physical Exam   Vital Signs: Temp: 99.6  F (37.6  C) Temp src: Oral BP: 139/70 Pulse: 80   Resp: 18 SpO2: 95 % O2 Device: None (Room air)    Weight: 193 lbs 9.02 oz    Constitutional: Awake, alert, cooperative, no apparent distress  Respiratory: Clear to auscultation bilaterally, no crackles or wheezing  Cardiovascular: Regular rate and rhythm, normal S1 and S2, and no murmur noted, no pain on palpation to sterum  GI: Normal bowel sounds, soft, non-distended, non-tender  Skin/Integumen: No rashes, no cyanosis, no edema  Other:     Medical Decision Making       55 MINUTES SPENT BY ME on the date of service doing chart review, history, exam, documentation & further activities per the note.      Data     I have personally reviewed the following  data over the past 24 hrs:    3.8 (L)  \   13.2   / 105 (L)     135 100 10.0 /  140 (H)   3.3 (L) 26 0.54 \     ALT: 42 AST: 91 (H) AP: 202 (H) TBILI: 0.4   ALB: 2.8 (L) TOT PROTEIN: 6.1 (L) LIPASE: N/A     TSH: 5.63 (H) T4: 1.18 A1C: N/A       Imaging results reviewed over the past 24 hrs:   Recent Results (from the past 24 hours)   XR Chest Port 1 View    Narrative    EXAM: XR CHEST PORT 1 VIEW  LOCATION: [facility]  DATE: 7/29/2025    INDICATION: Cough.  COMPARISON: 9/11/2022.      Impression    IMPRESSION:   No acute abnormality or significant interval change as compared to the previous study.    No focal pulmonary infiltrate, pleural effusion, or pneumothorax. The cardiac size and mediastinal contours appear within normal limits.

## 2025-07-30 NOTE — PROVIDER NOTIFICATION
MD Notification    Notified Person: MD    Notified Person Name: Abalfonso MD Tony     Notification Date/Time: 07/29/2025; 2030    Notification Interaction: Vocmatilda     Purpose of Notification: Pt c/o of a dry cough and requesting cough medication. Are you able to add medication for us if appropriate? If so, she is hoping for cough syrup.     Orders Received: orders placed for chest xray and cough syrup     Comments:

## 2025-07-30 NOTE — PLAN OF CARE
Goal Outcome Evaluation:      Plan of Care Reviewed With: patient    Overall Patient Progress: improvingOverall Patient Progress: improving     Summary:  07/29-30/2025; [id number]  Care Plan Summary Note: severe HTN  Orientation: A&O x4  Observation Goals (met & not met): not met  Activity Level: indep  Fall Risk: yes  Tele: NSR  Behavior & Aggression Tool Color: green  Pain Management: Tylenol x1 and ice packs for headache  ABNL VS/O2: HTN , SBP goal < 180; no antihypertensives given during this shift.   ABNL Lab/BG: NA  Diet: cardiac diet  Bowel/Bladder: continent  Drains/Devices: NA  Tests/Procedures for next shift:   Anticipated DC date: possibly today    Other Important Info: family at bedside can assist with translating  Orders obtained to cough syrup d/t patient c/o dry cough. Chest xray obtained, Negative

## 2025-07-30 NOTE — PROGRESS NOTES
DATE & SHIFT: 7/30 0770-1370  PRIMARY Concern: HTN  SAFETY RISK Concerns (fall risk, behaviors, etc.): none      Aggression Tool Color: green  Isolation/Type: none  Tests/Procedures for NEXT shift: none scheduled  Consults? (Pending/following, signed-off?) no consults ordered  Where is patient from? (Home, TCU, etc.): home  Other Important info for NEXT shift: SBP 200s this shift, lowered with morning meds/coreg/hydrochlorothiazide. No PRNs.  Anticipated DC date & active delays: 7/31-8/1 pending stable b/p  _____________________________________________________________________________  SUMMARY NOTE:   Orientation/Cognitive: a/ox4  Observation Goals (Met/ Not Met): IP  Mobility Level/Assist Equipment: ind/sba  Antibiotics & Plan (IV/po, length of tx left): none  Pain Management: tylx1 for ping shoulder pain  Complete Pain Reassessment: Y/N y Due next: shift routine  Tele/VS/O2: SBP 200s at 0700, down to 130s-150s this afternoon. Tele SR w/ PACs.  ABNL Lab/BG: K+ 3.3 (recheck 2015), TSH 5.63  Diet: cardiac  Bowel/Bladder: continent  Skin Concerns: wdl  Drains/Devices: PIV SL  Patient Stated Goal for Today: rest

## 2025-07-31 VITALS
WEIGHT: 193.56 LBS | HEART RATE: 81 BPM | SYSTOLIC BLOOD PRESSURE: 163 MMHG | DIASTOLIC BLOOD PRESSURE: 74 MMHG | OXYGEN SATURATION: 98 % | RESPIRATION RATE: 17 BRPM | TEMPERATURE: 98 F | BODY MASS INDEX: 31.24 KG/M2

## 2025-07-31 LAB — POTASSIUM SERPL-SCNC: 3.7 MMOL/L (ref 3.4–5.3)

## 2025-07-31 PROCEDURE — 250N000013 HC RX MED GY IP 250 OP 250 PS 637: Performed by: STUDENT IN AN ORGANIZED HEALTH CARE EDUCATION/TRAINING PROGRAM

## 2025-07-31 PROCEDURE — 250N000011 HC RX IP 250 OP 636: Performed by: INTERNAL MEDICINE

## 2025-07-31 PROCEDURE — 250N000013 HC RX MED GY IP 250 OP 250 PS 637: Performed by: HOSPITALIST

## 2025-07-31 PROCEDURE — 99239 HOSP IP/OBS DSCHRG MGMT >30: CPT | Performed by: HOSPITALIST

## 2025-07-31 PROCEDURE — 250N000013 HC RX MED GY IP 250 OP 250 PS 637: Performed by: INTERNAL MEDICINE

## 2025-07-31 RX ORDER — CARVEDILOL 12.5 MG/1
12.5 TABLET ORAL 2 TIMES DAILY WITH MEALS
Qty: 60 TABLET | Refills: 0 | Status: SHIPPED | OUTPATIENT
Start: 2025-07-31

## 2025-07-31 RX ORDER — PROCHLORPERAZINE MALEATE 5 MG/1
5 TABLET ORAL 2 TIMES DAILY PRN
Qty: 20 TABLET | Refills: 0 | Status: SHIPPED | OUTPATIENT
Start: 2025-07-31

## 2025-07-31 RX ORDER — CARVEDILOL 12.5 MG/1
12.5 TABLET ORAL 2 TIMES DAILY WITH MEALS
Status: DISCONTINUED | OUTPATIENT
Start: 2025-07-31 | End: 2025-07-31 | Stop reason: HOSPADM

## 2025-07-31 RX ORDER — HYDROCHLOROTHIAZIDE 12.5 MG/1
12.5 CAPSULE ORAL DAILY
Qty: 30 CAPSULE | Refills: 0 | Status: SHIPPED | OUTPATIENT
Start: 2025-08-01

## 2025-07-31 RX ADMIN — ASPIRIN 81 MG: 81 TABLET, DELAYED RELEASE ORAL at 08:01

## 2025-07-31 RX ADMIN — GUAIFENESIN AND CODEINE PHOSPHATE 5 ML: 100; 10 SOLUTION ORAL at 08:00

## 2025-07-31 RX ADMIN — AMLODIPINE BESYLATE 10 MG: 10 TABLET ORAL at 08:01

## 2025-07-31 RX ADMIN — LEVOTHYROXINE SODIUM 88 MCG: 0.09 TABLET ORAL at 08:01

## 2025-07-31 RX ADMIN — HYDRALAZINE HYDROCHLORIDE 10 MG: 20 INJECTION INTRAMUSCULAR; INTRAVENOUS at 00:48

## 2025-07-31 RX ADMIN — HYDROCHLOROTHIAZIDE 12.5 MG: 12.5 CAPSULE ORAL at 08:00

## 2025-07-31 RX ADMIN — ATORVASTATIN CALCIUM 40 MG: 40 TABLET, FILM COATED ORAL at 08:01

## 2025-07-31 RX ADMIN — ACETAMINOPHEN 650 MG: 325 TABLET, FILM COATED ORAL at 08:00

## 2025-07-31 RX ADMIN — PANTOPRAZOLE SODIUM 40 MG: 40 TABLET, DELAYED RELEASE ORAL at 08:01

## 2025-07-31 RX ADMIN — LISINOPRIL 40 MG: 40 TABLET ORAL at 08:01

## 2025-07-31 RX ADMIN — CARVEDILOL 6.25 MG: 6.25 TABLET, FILM COATED ORAL at 08:00

## 2025-07-31 ASSESSMENT — ACTIVITIES OF DAILY LIVING (ADL)
ADLS_ACUITY_SCORE: 41

## 2025-07-31 NOTE — PLAN OF CARE
Pt discharged to home at this time. Instructions given to family member here. All belongings sent with.       Goal Outcome Evaluation:    DATE & SHIFT: 7/31 2731-5510  PRIMARY Concern: HTN  SAFETY RISK Concerns (fall risk, behaviors, etc.): none      Aggression Tool Color: green  Isolation/Type: none  Tests/Procedures for NEXT shift: none scheduled  Consults? (Pending/following, signed-off?) no consults ordered  Where is patient from? (Home, TCU, etc.): home  Other Important info for NEXT shift: D/C  Anticipated DC date & active delays: 7/31  _____________________________________________________________________________  SUMMARY NOTE:   Orientation/Cognitive: a/ox4  Observation Goals (Met/ Not Met): IP  Mobility Level/Assist Equipment: ind/sba  Antibiotics & Plan (IV/po, length of tx left): none  Pain Management: tylx1 for ping shoulder pain  Complete Pain Reassessment: Y/N y Due next:  Tele/VS/O2: VSS   ABNL Lab/BG:   Diet: cardiac  Bowel/Bladder: continent  Skin Concerns: wdl  Drains/Devices: PIV SL  Patient Stated Goal for Today: discharge

## 2025-07-31 NOTE — DISCHARGE SUMMARY
"[facility]  Hospitalist Discharge Summary      Date of Admission:  7/28/2025  Date of Discharge:  7/31/2025  Discharging Provider: Trey Toussaint DO  Discharge Service: Hospitalist Service    Discharge Diagnoses   See below    Clinically Significant Risk Factors     # Obesity: Estimated body mass index is 31.24 kg/m  as calculated from the following:    Height as of an earlier encounter on 7/28/25: 1.676 m (5' 6\").    Weight as of this encounter: 87.8 kg (193 lb 9 oz).       Follow-ups Needed After Discharge   Follow-up Appointments       Hospital Follow-up with Existing Primary Care Provider (PCP)          Schedule Primary Care visit within: 7 Days   Recommended labs and Imaging (to be ordered by Primary Care Provider): bmp and cbc in 1 week               Unresulted Labs Ordered in the Past 30 Days of this Admission       No orders found from 6/28/2025 to 7/29/2025.        These results will be followed up by PCP    Discharge Disposition   Discharged to home  Condition at discharge: Stable    Hospital Course   Jennifer Stinson is a 75 year old female admitted on 7/28/2025. She presents with a headache and chest pain     Hypertension  Chest pain  Hx CAD  HLD  Headache  PTA amlodipine 10 mg daily,  mg daily, atorvastatin 40 mg daily, lisinopril 40 mg daily, metoprolol 50 mg daily  *BP historically in clinic mild elevated to normal. Has hx abnormal stress test, CT coronary angio w/ mild-mod CAD 2018. 2018 echo w/ nl EF, early diastolic dysfunction.   *Complaining of chest pain on presentation but minimizes for me. States has pain all over but denies cp/abd pain at this time. Breathing ok. C/o headache  *in ED BP as high as 224/111, improved 180s systolic with meds. Other VSS. Troponin 8->8. . EKG non-ischemic changes.   *CT head w/o acute process  * stress test negative  * blood pressure better controlled with change from metoprolol to coreg and addiing " hydrochlorothiazide  * repeat labs were stable  - continue lisinopril, amlodipine, hydrochlorothiazide, and change metoprolol to coreg. Will increase dose on discharge  - follow-up with PCP  - VERONICA testing as outpatient      Headache, shoulder pain  Probably tension type headaches  Plan  - can tylenol up to 2000 mg daily in setting of cirrhosis  - prn tylenol, then compazine if not helpful  - would try and avoid cough medicine with codeine in it given side effects    L sided abdominal pain  Cirrhosis w/ ascites  GERD  PTA omeprazole 20 mg daily  CT on presentation with cirrhosis, no ascites. No other acute findings.   - LFTs only mildly abnormal  - see mngi as OP    Thrombocytopenia  Plts 100. Baseline 185. Suspect 2/2 liver disease.   - monitor     Hypothyroidism  - resume levothyroxine, repeat TSH in 1-2 months     Hx CVA  2011.     Consultations This Hospital Stay   PHYSICAL THERAPY ADULT IP CONSULT    Code Status   Full Code    Time Spent on this Encounter   I, Trey Toussaint DO, personally saw the patient today and spent greater than 30 minutes discharging this patient.       Trey Toussaint DO  [facility] EXTENDED RECOVERY AND SHORT STAY  [address] [place] [id number]  Phone: [phone]  ______________________________________________________________________    Physical Exam   Vital Signs: Temp: 98  F (36.7  C) Temp src: Oral BP: (!) 163/74 Pulse: 81   Resp: 17 SpO2: 98 % O2 Device: None (Room air)    Weight: 193 lbs 9.02 oz  Constitutional: Awake, alert, cooperative, no apparent distress  Respiratory: Clear to auscultation bilaterally, no crackles or wheezing  Cardiovascular: Regular rate and rhythm, normal S1 and S2, and no murmur noted, no pain on palpation to sterum  GI: Normal bowel sounds, soft, non-distended, non-tender  Skin/Integumen: No rashes, no cyanosis, no edema  Other:            Primary Care Physician   Erika Luis    Discharge Orders      Reason for  your hospital stay    High blood presssure  Headache  Abdominal pain  Shoulder pain  Chest pain     Activity    Your activity upon discharge: activity as tolerated     Discharge Instructions    1. Follow up with regular doctor to discuss further cares including obstructive sleep apnea testing and blood pressure treatment  2. Your stress test was normal, and there is no concern for heart issues causing the chest pain. The patient may be musculoskeletal from things such as arthritis. Please use as needed tylenol up to 2000 mg daily (lower due to underlying cirrhosis)  3. Follow-up with [facility] for the cirrhosis of the liver.     Diet    Follow this diet upon discharge: Current Diet:Orders Placed This Encounter      Regular Diet Adult     Hospital Follow-up with Existing Primary Care Provider (PCP)            Significant Results and Procedures   Most Recent 3 CBC's:  Recent Labs   Lab Test 07/30/25  1100 07/28/25 1930 06/12/25  1351   WBC 3.8* 6.1 5.6   HGB 13.2 14.0 14.9   MCV 88 87 90   * 100* 185     Most Recent 3 BMP's:  Recent Labs   Lab Test 07/30/25  2359 07/30/25  1100 07/28/25  1930 06/12/25  1351   NA  --  135 133* 141   POTASSIUM 3.7 3.3* 4.5 4.0   CHLORIDE  --  100 99 104   CO2  --  26 26 29   BUN  --  10.0 10.2 13.2   CR  --  0.54 0.61 0.65   ANIONGAP  --  9 8 8   YOEL  --  8.0* 8.5* 9.3   GLC  --  140* 99 97     Most Recent 2 LFT's:  Recent Labs   Lab Test 07/30/25  1100 07/29/25  0624   AST 91* 46*   ALT 42 28   ALKPHOS 202* 204*   BILITOTAL 0.4 0.5   ,   Results for orders placed or performed during the hospital encounter of 07/28/25   Abd/pelvis CT no contrast - Stone Protocol    Narrative    EXAM: CT ABDOMEN PELVIS W/O CONTRAST  LOCATION: [facility]  DATE: 7/28/2025    INDICATION: Left flank pain.  COMPARISON: 3/30/2014  TECHNIQUE: CT scan of the abdomen and pelvis was performed without IV contrast. Multiplanar reformats were obtained. Dose reduction techniques were  used.  CONTRAST: None.    FINDINGS:   LOWER CHEST: No consolidation or pleural effusion.    HEPATOBILIARY: Diffuse surface nodularity of the liver, new from prior. Gallbladder is surgically absent.    PANCREAS: Normal.    SPLEEN: Normal.    ADRENAL GLANDS: Normal.    KIDNEYS/BLADDER: Normal. No urinary stones or hydroureteronephrosis.    BOWEL: Multiple colonic diverticula present, most highly concentrated in the sigmoid colon, without acute diverticulitis. No abnormal bowel thickening or bowel obstruction. Appendix is normal. No free fluid or free air.    LYMPH NODES: Normal.    VASCULATURE: Mild aortoiliac atherosclerotic calcifications. No abdominal aortic aneurysm.    PELVIC ORGANS: Normal.    MUSCULOSKELETAL: Mild spinal degenerative changes. No suspicious osseous lesions or acute fractures.        Impression    IMPRESSION:     1.  Interval development of cirrhosis. No abdominal ascites.    2.  Colonic diverticulosis without acute diverticulitis.     Head CT w/o contrast    Narrative    EXAM: CT HEAD W/O CONTRAST  LOCATION: [facility]  DATE: 7/29/2025    INDICATION: headache  COMPARISON:  MRI brain September 7, 2000 and.  TECHNIQUE: Routine CT Head without IV contrast. Multiplanar reformats. Dose reduction techniques were used.    FINDINGS:  INTRACRANIAL CONTENTS: No intracranial hemorrhage, extraaxial collection, or mass effect.  No CT evidence of acute infarct. Left basal ganglia remote lacunar infarcts. Normal parenchymal attenuation. Normal ventricles and sulci. Flattening of the   pituitary gland against the floor of the expanded sella. This is most commonly incidental but can be seen in the setting of intracranial hypertension.    VISUALIZED ORBITS/SINUSES/MASTOIDS: No intraorbital abnormality. No paranasal sinus mucosal disease. No middle ear or mastoid effusion.    BONES/SOFT TISSUES: No acute abnormality.      Impression    IMPRESSION:  1.  No acute intracranial process.      XR Chest Port 1 View    Narrative    EXAM: XR CHEST PORT 1 VIEW  LOCATION: [facility]  DATE: 7/29/2025    INDICATION: Cough.  COMPARISON: 9/11/2022.      Impression    IMPRESSION:   No acute abnormality or significant interval change as compared to the previous study.    No focal pulmonary infiltrate, pleural effusion, or pneumothorax. The cardiac size and mediastinal contours appear within normal limits.       NM Lexiscan stress test (nuc card)     Value    Target     Baseline Systolic     Baseline Diastolic BP 70    Last Stress Systolic     Last Stress Diastolic BP 64    Baseline HR 87    Max HR  106    Max Predicted HR  72    Rate Pressure Product 13,992.0    Stress/rest perfusion ratio 1.19    Narrative      Lexiscan nuclear stress test is negative for inducible myocardial   ischemia or infarction.    Normal stress and rest perfusion images.    Left ventricular ejection fraction at stress is greater than 70%.    Stress to rest cavity ratio is 1.19.         Discharge Medications      Review of your medicines        START taking        Dose / Directions   carvedilol 12.5 MG tablet  Commonly known as: COREG      Dose: 12.5 mg  Take 1 tablet (12.5 mg) by mouth 2 times daily (with meals).  Quantity: 60 tablet  Refills: 0     hydrochlorothiazide 12.5 MG capsule  Commonly known as: MICROZIDE      Dose: 12.5 mg  Start taking on: August 1, 2025  Take 1 capsule (12.5 mg) by mouth daily.  Quantity: 30 capsule  Refills: 0     prochlorperazine 5 MG tablet  Commonly known as: COMPAZINE      Dose: 5 mg  Take 1 tablet (5 mg) by mouth 2 times daily as needed for nausea or vomiting.  Quantity: 20 tablet  Refills: 0            CONTINUE these medicines which have NOT CHANGED        Dose / Directions   amLODIPine 10 MG tablet  Commonly known as: NORVASC  Used for: Essential hypertension with goal blood pressure less than 140/90      Dose: 10 mg  TAKE 1 TABLET(10 MG) BY MOUTH  DAILY  Quantity: 90 tablet  Refills: 2     aspirin 81 MG EC tablet      Dose: 81 mg  Take 81 mg by mouth daily.  Refills: 0     atorvastatin 40 MG tablet  Commonly known as: LIPITOR  Used for: Chest pain, unspecified type      Dose: 40 mg  Take 1 tablet (40 mg) by mouth daily  Quantity: 90 tablet  Refills: 2     levothyroxine 88 MCG tablet  Commonly known as: SYNTHROID/LEVOTHROID  Used for: Other specified hypothyroidism      Dose: 88 mcg  TAKE 1 TABLET(88 MCG) BY MOUTH DAILY  Quantity: 90 tablet  Refills: 2     lisinopril 40 MG tablet  Commonly known as: ZESTRIL  Used for: Essential hypertension with goal blood pressure less than 140/90      TAKE 1 TABLET(40 MG) BY MOUTH DAILY  Quantity: 90 tablet  Refills: 2     multiple vitamin  tablet Tabs  Used for: Other specified disorders of bone density and structure, multiple sites      Dose: 1 tablet  Take 1 tablet by mouth daily  Quantity: 90 tablet  Refills: 3     omeprazole 20 MG DR capsule  Commonly known as: PriLOSEC  Used for: Gastroesophageal reflux disease without esophagitis      TAKE 1 CAPSULE(20 MG) BY MOUTH EVERY DAY 30 TO 60 MINUTES BEFORE A MEAL  Quantity: 90 capsule  Refills: 2            STOP taking      metoprolol succinate ER 50 MG 24 hr tablet  Commonly known as: TOPROL XL                  Where to get your medicines        These medications were sent to [facility], [place] - [address] [address]      Phone: [phone]   carvedilol 12.5 MG tablet  hydrochlorothiazide 12.5 MG capsule  prochlorperazine 5 MG tablet       Allergies   Allergies   Allergen Reactions    Eggs Or Egg-Derived Products [Chicken-Derived Products (Egg)]     No Clinical Screening - See Comments Swelling and Rash     Other reaction(s): Edema  All meat: poultry, beef, etc.  Reaction: swelling  Cinnamon and other spices  Reaction: face swelling, rash  Cinnamon and other spices  Reaction: face swelling, rash  All  meat: poultry, beef, etc.  Reaction: swelling    Nuts Other (See Comments) and Rash     All nuts  Reaction: swelling, rash, headache    Peanut-Containing Drug Products Rash     Other reaction(s): Headache  All nuts  Reaction: swelling, rash, headache

## 2025-07-31 NOTE — PLAN OF CARE
[id number]  A/OX4,VSS, except with BP slightly elevated. Regular diet. Up independent in room. Family at bedside. Denies pain on my shift. IV SL. Potassium protocol.

## 2025-07-31 NOTE — PLAN OF CARE
Goal Outcome Evaluation:      Plan of Care Reviewed With: patient, family    Overall Patient Progress: improving       DATE & SHIFT: 7/30 [id number]  PRIMARY Concern: Severe headache, Htn  SAFETY RISK Concerns (fall risk, behaviors, etc.): none      Aggression Tool Color: green  Isolation/Type:n/a  Tests/Procedures for NEXT shift: none  Consults? (Pending/following, signed-off?) n/a  Where is patient from? (Home, TCU, etc.): home  Other Important info for NEXT shift: SBP in 180s this shift, PRN Hydralazine given x1 with good effect    Anticipated DC date & active delays:possibly home with home care today, pending stable B.P  _____________________________________________________________________________  SUMMARY NOTE:   Orientation/Cognitive:A&OX4, Cymro speaking, understands some English  Observation Goals (Met/ Not Met):inpt status  Mobility Level/Assist Equipment:Ind in room, family in room as well  Antibiotics & Plan (IV/po, length of tx left):n/a  Pain Management: PRN Tyl available  Complete Pain Reassessment: n/a Due next: shift routine  Tele/VS/O2: Elevated B.P, other VSS on RA   ABNL Lab/BG: None this shift  Diet: Regular  Bowel/Bladder: Continent  Skin Concerns: None noted  Drains/Devices: PIV SL  Patient Stated Goal for Today: sleep

## 2025-08-02 LAB
ATRIAL RATE - MUSE: 71 BPM
DIASTOLIC BLOOD PRESSURE - MUSE: NORMAL MMHG
INTERPRETATION ECG - MUSE: NORMAL
P AXIS - MUSE: 66 DEGREES
PR INTERVAL - MUSE: 158 MS
QRS DURATION - MUSE: 78 MS
QT - MUSE: 414 MS
QTC - MUSE: 449 MS
R AXIS - MUSE: 42 DEGREES
SYSTOLIC BLOOD PRESSURE - MUSE: NORMAL MMHG
T AXIS - MUSE: 2 DEGREES
VENTRICULAR RATE- MUSE: 71 BPM

## 2025-08-05 ENCOUNTER — TRANSFERRED RECORDS (OUTPATIENT)
Dept: ADMINISTRATIVE | Facility: CLINIC | Age: 75
End: 2025-08-05

## 2025-08-05 ENCOUNTER — OFFICE VISIT (OUTPATIENT)
Dept: INTERNAL MEDICINE | Facility: CLINIC | Age: 75
End: 2025-08-05
Attending: HOSPITALIST
Payer: COMMERCIAL

## 2025-08-05 VITALS
BODY MASS INDEX: 30.41 KG/M2 | HEIGHT: 66 IN | HEART RATE: 78 BPM | SYSTOLIC BLOOD PRESSURE: 124 MMHG | TEMPERATURE: 98 F | RESPIRATION RATE: 18 BRPM | OXYGEN SATURATION: 97 % | WEIGHT: 189.2 LBS | DIASTOLIC BLOOD PRESSURE: 68 MMHG

## 2025-08-05 DIAGNOSIS — K74.69 OTHER CIRRHOSIS OF LIVER (H): ICD-10-CM

## 2025-08-05 DIAGNOSIS — E03.8 OTHER SPECIFIED HYPOTHYROIDISM: ICD-10-CM

## 2025-08-05 DIAGNOSIS — Z09 HOSPITAL DISCHARGE FOLLOW-UP: Primary | ICD-10-CM

## 2025-08-05 DIAGNOSIS — K14.8 TONGUE LESION: ICD-10-CM

## 2025-08-05 DIAGNOSIS — M85.89 OTHER SPECIFIED DISORDERS OF BONE DENSITY AND STRUCTURE, MULTIPLE SITES: ICD-10-CM

## 2025-08-05 DIAGNOSIS — I10 ESSENTIAL HYPERTENSION: ICD-10-CM

## 2025-08-05 PROCEDURE — 99214 OFFICE O/P EST MOD 30 MIN: CPT | Performed by: INTERNAL MEDICINE

## 2025-08-05 PROCEDURE — G2211 COMPLEX E/M VISIT ADD ON: HCPCS | Performed by: INTERNAL MEDICINE

## 2025-08-05 PROCEDURE — 3078F DIAST BP <80 MM HG: CPT | Performed by: INTERNAL MEDICINE

## 2025-08-05 PROCEDURE — 3074F SYST BP LT 130 MM HG: CPT | Performed by: INTERNAL MEDICINE

## 2025-08-05 RX ORDER — MULTIVITAMIN
1 TABLET ORAL DAILY
Qty: 90 TABLET | Refills: 3 | Status: SHIPPED | OUTPATIENT
Start: 2025-08-05

## 2025-08-06 ENCOUNTER — TELEPHONE (OUTPATIENT)
Dept: INTERNAL MEDICINE | Facility: CLINIC | Age: 75
End: 2025-08-06
Payer: COMMERCIAL

## 2025-08-06 ENCOUNTER — PATIENT OUTREACH (OUTPATIENT)
Dept: CARE COORDINATION | Facility: CLINIC | Age: 75
End: 2025-08-06
Payer: COMMERCIAL

## 2025-08-14 ENCOUNTER — APPOINTMENT (OUTPATIENT)
Dept: INTERPRETER SERVICES | Facility: CLINIC | Age: 75
End: 2025-08-14
Payer: COMMERCIAL

## (undated) RX ORDER — REGADENOSON 0.08 MG/ML
INJECTION, SOLUTION INTRAVENOUS
Status: DISPENSED
Start: 2025-07-29

## (undated) RX ORDER — METOPROLOL TARTRATE 50 MG
TABLET ORAL
Status: DISPENSED
Start: 2018-07-06

## (undated) RX ORDER — NITROGLYCERIN 0.4 MG/1
TABLET SUBLINGUAL
Status: DISPENSED
Start: 2018-07-06